# Patient Record
Sex: FEMALE | Race: BLACK OR AFRICAN AMERICAN | NOT HISPANIC OR LATINO | Employment: OTHER
[De-identification: names, ages, dates, MRNs, and addresses within clinical notes are randomized per-mention and may not be internally consistent; named-entity substitution may affect disease eponyms.]

---

## 2017-02-22 ENCOUNTER — CHARTING TRANS (OUTPATIENT)
Dept: OTHER | Age: 57
End: 2017-02-22

## 2017-02-22 ENCOUNTER — LAB SERVICES (OUTPATIENT)
Dept: OTHER | Age: 57
End: 2017-02-22

## 2017-02-22 LAB
APPEARANCE: CLEAR
BILIRUBIN: NORMAL
COLOR: YELLOW
KETONES: NEGATIVE
LEUKOCYTES: NORMAL
OCCULT BLOOD: NORMAL
PH: 5.5
PROTEIN: NEGATIVE
URINE SPEC GRAVITY: >=1.03

## 2017-02-27 ENCOUNTER — CHARTING TRANS (OUTPATIENT)
Dept: OTHER | Age: 57
End: 2017-02-27

## 2017-02-27 LAB — BACTERIA UR CULT: NORMAL

## 2017-03-07 ENCOUNTER — LAB SERVICES (OUTPATIENT)
Dept: OTHER | Age: 57
End: 2017-03-07

## 2017-03-07 ENCOUNTER — CHARTING TRANS (OUTPATIENT)
Dept: OTHER | Age: 57
End: 2017-03-07

## 2017-03-07 LAB
APPEARANCE: CLEAR
BILIRUBIN: NORMAL
COLOR: YELLOW
GLUCOSE U: NORMAL
KETONES: NORMAL
LEUKOCYTE ESTERASE: NORMAL
NITRITE: NORMAL
OCCULT BLOOD: NORMAL
PH: 56
PROTEIN: NORMAL
URINE SPEC GRAVITY: 1.02
UROBILINOGEN: 0.2

## 2017-03-09 LAB — BACTERIA UR CULT: NORMAL

## 2017-06-26 ENCOUNTER — CHARTING TRANS (OUTPATIENT)
Dept: OTHER | Age: 57
End: 2017-06-26

## 2017-06-26 ENCOUNTER — LAB SERVICES (OUTPATIENT)
Dept: OTHER | Age: 57
End: 2017-06-26

## 2017-06-26 LAB
APPEARANCE: CLEAR
BILIRUBIN: NORMAL
COLOR: YELLOW
GLUCOSE U: NORMAL
KETONES: NORMAL
LEUKOCYTE ESTERASE: NORMAL
LEUKOCYTES: NORMAL
NITRITE: NORMAL
OCCULT BLOOD: NORMAL
OTHER: NORMAL
PH: 5
PROTEIN: NORMAL
URINE SPEC GRAVITY: 1
UROBILINOGEN: 0.2

## 2017-06-30 LAB — BACTERIA UR CULT: NORMAL

## 2017-10-16 ENCOUNTER — HOSPITAL ENCOUNTER (OUTPATIENT)
Dept: CT IMAGING | Age: 57
Discharge: HOME | End: 2017-10-16
Attending: ORTHOPAEDIC SURGERY
Payer: COMMERCIAL

## 2017-10-16 DIAGNOSIS — M19.021 OSTEOARTHRITIS OF RIGHT ELBOW: ICD-10-CM

## 2017-10-16 PROCEDURE — 76376 3D RENDER W/INTRP POSTPROCES: CPT | Performed by: ORTHOPAEDIC SURGERY

## 2017-10-16 PROCEDURE — 73200 CT UPPER EXTREMITY W/O DYE: CPT | Performed by: ORTHOPAEDIC SURGERY

## 2017-12-06 ENCOUNTER — APPOINTMENT (OUTPATIENT)
Dept: LAB | Age: 57
End: 2017-12-06
Attending: ORTHOPAEDIC SURGERY
Payer: COMMERCIAL

## 2017-12-06 ENCOUNTER — APPOINTMENT (OUTPATIENT)
Dept: LAB | Facility: HOSPITAL | Age: 57
End: 2017-12-06
Attending: ORTHOPAEDIC SURGERY
Payer: COMMERCIAL

## 2017-12-06 ENCOUNTER — LAB ENCOUNTER (OUTPATIENT)
Dept: LAB | Age: 57
End: 2017-12-06
Attending: ORTHOPAEDIC SURGERY
Payer: COMMERCIAL

## 2017-12-06 DIAGNOSIS — M19.021 OSTEOARTHRITIS OF RIGHT ELBOW: ICD-10-CM

## 2017-12-06 DIAGNOSIS — M19.021 OSTEOARTHRITIS OF RIGHT ELBOW: Primary | ICD-10-CM

## 2017-12-06 PROCEDURE — 36415 COLL VENOUS BLD VENIPUNCTURE: CPT

## 2017-12-06 PROCEDURE — 80048 BASIC METABOLIC PNL TOTAL CA: CPT

## 2017-12-06 PROCEDURE — 93010 ELECTROCARDIOGRAM REPORT: CPT | Performed by: ORTHOPAEDIC SURGERY

## 2017-12-06 PROCEDURE — 93005 ELECTROCARDIOGRAM TRACING: CPT

## 2017-12-16 RX ORDER — OMEGA-3 FATTY ACIDS/FISH OIL 300-1000MG
3 CAPSULE ORAL 2 TIMES DAILY PRN
COMMUNITY
End: 2019-07-22

## 2017-12-19 ENCOUNTER — HOSPITAL ENCOUNTER (OUTPATIENT)
Facility: HOSPITAL | Age: 57
Setting detail: HOSPITAL OUTPATIENT SURGERY
Discharge: HOME | End: 2017-12-19
Attending: ORTHOPAEDIC SURGERY | Admitting: ORTHOPAEDIC SURGERY
Payer: COMMERCIAL

## 2017-12-19 ENCOUNTER — ANESTHESIA (OUTPATIENT)
Dept: SURGERY | Facility: HOSPITAL | Age: 57
End: 2017-12-19
Payer: COMMERCIAL

## 2017-12-19 ENCOUNTER — SURGERY (OUTPATIENT)
Age: 57
End: 2017-12-19

## 2017-12-19 ENCOUNTER — ANESTHESIA EVENT (OUTPATIENT)
Dept: SURGERY | Facility: HOSPITAL | Age: 57
End: 2017-12-19
Payer: COMMERCIAL

## 2017-12-19 VITALS
WEIGHT: 230 LBS | BODY MASS INDEX: 42.33 KG/M2 | DIASTOLIC BLOOD PRESSURE: 75 MMHG | HEIGHT: 62 IN | TEMPERATURE: 98 F | HEART RATE: 95 BPM | OXYGEN SATURATION: 94 % | RESPIRATION RATE: 15 BRPM | SYSTOLIC BLOOD PRESSURE: 139 MMHG

## 2017-12-19 DIAGNOSIS — G56.01 CARPAL TUNNEL SYNDROME OF RIGHT WRIST: ICD-10-CM

## 2017-12-19 DIAGNOSIS — M19.021 PRIMARY OSTEOARTHRITIS OF RIGHT ELBOW: Primary | ICD-10-CM

## 2017-12-19 PROCEDURE — 99152 MOD SED SAME PHYS/QHP 5/>YRS: CPT | Performed by: ORTHOPAEDIC SURGERY

## 2017-12-19 PROCEDURE — 64415 NJX AA&/STRD BRCH PLXS IMG: CPT | Performed by: ORTHOPAEDIC SURGERY

## 2017-12-19 PROCEDURE — 88305 TISSUE EXAM BY PATHOLOGIST: CPT | Performed by: ORTHOPAEDIC SURGERY

## 2017-12-19 PROCEDURE — 3E0T3BZ INTRODUCTION OF ANESTHETIC AGENT INTO PERIPHERAL NERVES AND PLEXI, PERCUTANEOUS APPROACH: ICD-10-PCS | Performed by: ANESTHESIOLOGY

## 2017-12-19 PROCEDURE — 01N40ZZ RELEASE ULNAR NERVE, OPEN APPROACH: ICD-10-PCS | Performed by: ORTHOPAEDIC SURGERY

## 2017-12-19 PROCEDURE — 01N50ZZ RELEASE MEDIAN NERVE, OPEN APPROACH: ICD-10-PCS | Performed by: ORTHOPAEDIC SURGERY

## 2017-12-19 PROCEDURE — 76942 ECHO GUIDE FOR BIOPSY: CPT | Performed by: ORTHOPAEDIC SURGERY

## 2017-12-19 PROCEDURE — 88311 DECALCIFY TISSUE: CPT | Performed by: ORTHOPAEDIC SURGERY

## 2017-12-19 PROCEDURE — 88304 TISSUE EXAM BY PATHOLOGIST: CPT | Performed by: ORTHOPAEDIC SURGERY

## 2017-12-19 RX ORDER — LIDOCAINE HYDROCHLORIDE 10 MG/ML
INJECTION, SOLUTION EPIDURAL; INFILTRATION; INTRACAUDAL; PERINEURAL AS NEEDED
Status: DISCONTINUED | OUTPATIENT
Start: 2017-12-19 | End: 2017-12-19 | Stop reason: SURG

## 2017-12-19 RX ORDER — HYDROCODONE BITARTRATE AND ACETAMINOPHEN 5; 325 MG/1; MG/1
2 TABLET ORAL AS NEEDED
Status: COMPLETED | OUTPATIENT
Start: 2017-12-19 | End: 2017-12-19

## 2017-12-19 RX ORDER — LIDOCAINE HYDROCHLORIDE 10 MG/ML
INJECTION, SOLUTION EPIDURAL; INFILTRATION; INTRACAUDAL; PERINEURAL AS NEEDED
Status: DISCONTINUED | OUTPATIENT
Start: 2017-12-19 | End: 2017-12-19

## 2017-12-19 RX ORDER — METOCLOPRAMIDE 10 MG/1
10 TABLET ORAL ONCE
Status: COMPLETED | OUTPATIENT
Start: 2017-12-19 | End: 2017-12-19

## 2017-12-19 RX ORDER — ROPIVACAINE HYDROCHLORIDE 5 MG/ML
INJECTION, SOLUTION EPIDURAL; INFILTRATION; PERINEURAL AS NEEDED
Status: DISCONTINUED | OUTPATIENT
Start: 2017-12-19 | End: 2017-12-19 | Stop reason: SURG

## 2017-12-19 RX ORDER — ACETAMINOPHEN 500 MG
1000 TABLET ORAL ONCE
Status: COMPLETED | OUTPATIENT
Start: 2017-12-19 | End: 2017-12-19

## 2017-12-19 RX ORDER — CEFAZOLIN SODIUM/WATER 2 G/20 ML
2 SYRINGE (ML) INTRAVENOUS ONCE
Status: COMPLETED | OUTPATIENT
Start: 2017-12-19 | End: 2017-12-19

## 2017-12-19 RX ORDER — MORPHINE SULFATE 10 MG/ML
6 INJECTION, SOLUTION INTRAMUSCULAR; INTRAVENOUS EVERY 10 MIN PRN
Status: DISCONTINUED | OUTPATIENT
Start: 2017-12-19 | End: 2017-12-19

## 2017-12-19 RX ORDER — SODIUM CHLORIDE, SODIUM LACTATE, POTASSIUM CHLORIDE, CALCIUM CHLORIDE 600; 310; 30; 20 MG/100ML; MG/100ML; MG/100ML; MG/100ML
INJECTION, SOLUTION INTRAVENOUS CONTINUOUS
Status: DISCONTINUED | OUTPATIENT
Start: 2017-12-19 | End: 2017-12-19

## 2017-12-19 RX ORDER — DEXAMETHASONE SODIUM PHOSPHATE 4 MG/ML
VIAL (ML) INJECTION AS NEEDED
Status: DISCONTINUED | OUTPATIENT
Start: 2017-12-19 | End: 2017-12-19 | Stop reason: SURG

## 2017-12-19 RX ORDER — HYDROCODONE BITARTRATE AND ACETAMINOPHEN 5; 325 MG/1; MG/1
1 TABLET ORAL AS NEEDED
Status: COMPLETED | OUTPATIENT
Start: 2017-12-19 | End: 2017-12-19

## 2017-12-19 RX ORDER — NALOXONE HYDROCHLORIDE 0.4 MG/ML
80 INJECTION, SOLUTION INTRAMUSCULAR; INTRAVENOUS; SUBCUTANEOUS AS NEEDED
Status: ACTIVE | OUTPATIENT
Start: 2017-12-19 | End: 2017-12-19

## 2017-12-19 RX ORDER — ONDANSETRON 2 MG/ML
INJECTION INTRAMUSCULAR; INTRAVENOUS AS NEEDED
Status: DISCONTINUED | OUTPATIENT
Start: 2017-12-19 | End: 2017-12-19 | Stop reason: SURG

## 2017-12-19 RX ORDER — ONDANSETRON 2 MG/ML
4 INJECTION INTRAMUSCULAR; INTRAVENOUS ONCE AS NEEDED
Status: DISCONTINUED | OUTPATIENT
Start: 2017-12-19 | End: 2017-12-19

## 2017-12-19 RX ORDER — HYDROMORPHONE HYDROCHLORIDE 1 MG/ML
0.6 INJECTION, SOLUTION INTRAMUSCULAR; INTRAVENOUS; SUBCUTANEOUS EVERY 5 MIN PRN
Status: DISCONTINUED | OUTPATIENT
Start: 2017-12-19 | End: 2017-12-19

## 2017-12-19 RX ORDER — MORPHINE SULFATE 2 MG/ML
2 INJECTION, SOLUTION INTRAMUSCULAR; INTRAVENOUS EVERY 10 MIN PRN
Status: DISCONTINUED | OUTPATIENT
Start: 2017-12-19 | End: 2017-12-19

## 2017-12-19 RX ORDER — HALOPERIDOL 5 MG/ML
0.25 INJECTION INTRAMUSCULAR ONCE AS NEEDED
Status: DISCONTINUED | OUTPATIENT
Start: 2017-12-19 | End: 2017-12-19

## 2017-12-19 RX ORDER — FAMOTIDINE 20 MG/1
20 TABLET ORAL ONCE
Status: COMPLETED | OUTPATIENT
Start: 2017-12-19 | End: 2017-12-19

## 2017-12-19 RX ORDER — HYDROMORPHONE HYDROCHLORIDE 1 MG/ML
0.2 INJECTION, SOLUTION INTRAMUSCULAR; INTRAVENOUS; SUBCUTANEOUS EVERY 5 MIN PRN
Status: DISCONTINUED | OUTPATIENT
Start: 2017-12-19 | End: 2017-12-19

## 2017-12-19 RX ORDER — HYDROMORPHONE HYDROCHLORIDE 1 MG/ML
0.4 INJECTION, SOLUTION INTRAMUSCULAR; INTRAVENOUS; SUBCUTANEOUS EVERY 5 MIN PRN
Status: DISCONTINUED | OUTPATIENT
Start: 2017-12-19 | End: 2017-12-19

## 2017-12-19 RX ORDER — MIDAZOLAM HYDROCHLORIDE 1 MG/ML
INJECTION INTRAMUSCULAR; INTRAVENOUS AS NEEDED
Status: DISCONTINUED | OUTPATIENT
Start: 2017-12-19 | End: 2017-12-19 | Stop reason: SURG

## 2017-12-19 RX ORDER — MORPHINE SULFATE 4 MG/ML
4 INJECTION, SOLUTION INTRAMUSCULAR; INTRAVENOUS EVERY 10 MIN PRN
Status: DISCONTINUED | OUTPATIENT
Start: 2017-12-19 | End: 2017-12-19

## 2017-12-19 RX ADMIN — LIDOCAINE HYDROCHLORIDE 25 MG: 10 INJECTION, SOLUTION EPIDURAL; INFILTRATION; INTRACAUDAL; PERINEURAL at 16:19:00

## 2017-12-19 RX ADMIN — SODIUM CHLORIDE, SODIUM LACTATE, POTASSIUM CHLORIDE, CALCIUM CHLORIDE: 600; 310; 30; 20 INJECTION, SOLUTION INTRAVENOUS at 15:25:00

## 2017-12-19 RX ADMIN — DEXAMETHASONE SODIUM PHOSPHATE 4 MG: 4 MG/ML VIAL (ML) INJECTION at 11:24:00

## 2017-12-19 RX ADMIN — ONDANSETRON 4 MG: 2 INJECTION INTRAMUSCULAR; INTRAVENOUS at 11:24:00

## 2017-12-19 RX ADMIN — CEFAZOLIN SODIUM/WATER 2 G: 2 G/20 ML SYRINGE (ML) INTRAVENOUS at 11:20:00

## 2017-12-19 RX ADMIN — CEFAZOLIN SODIUM/WATER 2 G: 2 G/20 ML SYRINGE (ML) INTRAVENOUS at 15:20:00

## 2017-12-19 RX ADMIN — MIDAZOLAM HYDROCHLORIDE 2 MG: 1 INJECTION INTRAMUSCULAR; INTRAVENOUS at 11:14:00

## 2017-12-19 RX ADMIN — ROPIVACAINE HYDROCHLORIDE 30 ML: 5 INJECTION, SOLUTION EPIDURAL; INFILTRATION; PERINEURAL at 16:28:00

## 2017-12-19 RX ADMIN — SODIUM CHLORIDE, SODIUM LACTATE, POTASSIUM CHLORIDE, CALCIUM CHLORIDE: 600; 310; 30; 20 INJECTION, SOLUTION INTRAVENOUS at 11:09:00

## 2017-12-19 RX ADMIN — LIDOCAINE HYDROCHLORIDE 50 MG: 10 INJECTION, SOLUTION EPIDURAL; INFILTRATION; INTRACAUDAL; PERINEURAL at 11:14:00

## 2017-12-19 NOTE — ANESTHESIA POSTPROCEDURE EVALUATION
Patient: Moose Self    Procedure Summary     Date:  12/19/17 Room / Location:  [address] [place] [address] MAIN OR 03 / [address] [place] [address] MAIN OR    Anesthesia Start:  1109 Anesthesia Stop:      Procedures:       EXTREMITY UPPER IRRIGATION & DEBRIDEMENT (Right Elbow)      ELBOW ULNAR NERV

## 2017-12-19 NOTE — ANESTHESIA PREPROCEDURE EVALUATION
Anesthesia PreOp Note    HPI:     Rasta Freeman is a 62year old female who presents for preoperative consultation requested by: Pamela Deng MD    Date of Surgery: 12/19/2017    Procedure(s):  EXTREMITY UPPER IRRIGATION & DEBRIDEMENT  ELBOW ULNAR NER POTASSIUM OR Take 99 mcg by mouth daily. Disp:  Rfl:  12/12/2017 at Unknown time   Multiple Vitamins-Minerals (MULTIVITAMIN OR) Take 1 tablet by mouth daily.    Disp:  Rfl:  12/12/2017 at Unknown time       Current Facility-Administered Medications Orde Weight: 104.3 kg (230 lb) 104.3 kg (230 lb)   Height: 1.575 m (5' 2\")         Anesthesia ROS/Med Hx and Physical Exam     Patient summary reviewed and Nursing notes reviewed    No history of anesthetic complications   Airway   Mallampati: I  TM distance

## 2017-12-19 NOTE — PROGRESS NOTES
[facility]    Progress Note    Deja López Patient Status:  Hospital Outpatient Surgery    3/31/1960 MRN [id number]   Location [facility] PRE OP RECOVERY Attending Herber Brown MD   Hosp Day # 0 PCP Kade Fountain MD

## 2017-12-19 NOTE — BRIEF OP NOTE
Pre-Operative Diagnosis: primary osteoarthritis right elbow     Post-Operative Diagnosis: primary osteoarthritis right elbow     Procedure Performed:   Procedure(s):  right elbow debridement of osteophytes, contracture release,cubital tunnel release,rig

## 2017-12-19 NOTE — PROGRESS NOTES
[facility]    Progress Note    Ivan Lundborg Patient Status:  Hospital Outpatient Surgery    3/31/1960 MRN [id number]   Location [facility] PRE OP RECOVERY Attending Chen Sarmiento MD   Hosp Day # 0 PCP Benedict Vasquez MD

## 2017-12-19 NOTE — ANESTHESIA PROCEDURE NOTES
Peripheral Block    Anesthesiologist:  Sissy Andino  Performed by:   Anesthesiologist  Patient Location:  PACU  Start Time:  12/19/2017 4:17 PM  End Time:  12/19/2017 4:28 PM  Site Identification: ultrasound guided, real time ultrasound guided, nerve stimu

## 2017-12-20 NOTE — OPERATIVE REPORT
[facility]    PATIENT'S NAME: Mireya Krishnamurthy   ATTENDING PHYSICIAN: Apoorva Abraham MD   OPERATING PHYSICIAN: Apoorva Abraham MD   PATIENT ACCOUNT#:   [id number]    LOCATION:  [place] [address]  MEDICAL RECORD #:   [id number]       DATE OF BIR room for surgical intervention. PROCEDURE:  On the afternoon of 12/19/2017, the patient was identified in the preoperative holding area.   The right upper extremity was marked as the surgical site of interest.  She was transported to the operating room w posteromedial approach to the right elbow was carried out. The skin overlying the posteromedial aspect of the right elbow was incised with a 15 blade scalpel.   The subcutaneous tissues were bluntly dissected, taking care to preserve branches of the medial capsule at this point. The flexor pronator and brachialis muscular sleeve was then retracted anteriorly allowing for exposure of the anterior capsule. The anterior capsule was then resected utilizing a Metzenbaum scissors.   Osteophytes at the level of th Sergey Barnes MD  d: 12/19/2017 16:08:10  t: 12/19/2017 19:28:51  [facility] [id number]  DB/

## 2017-12-29 ENCOUNTER — LAB SERVICES (OUTPATIENT)
Dept: OTHER | Age: 57
End: 2017-12-29

## 2017-12-29 ENCOUNTER — CHARTING TRANS (OUTPATIENT)
Dept: OTHER | Age: 57
End: 2017-12-29

## 2017-12-29 LAB
PEAKFLOW 1: 400
PEAKFLOW 2: 450
PEAKFLOW 3: 480

## 2017-12-31 NOTE — H&P
Ruth Benites 541 Patient Status:  [facility] Outpatient Surgery    3/31/1960 MRN [id number]   Location [facility] Attending No att. providers found   Hosp Day # 0 PCP Madisyn Tong pulse 95, temperature 98.1 °F (36.7 °C), temperature source Temporal, resp. rate 15, height 5' 2\" (1.575 m), weight 230 lb (104.3 kg), last menstrual period 12/15/2010, SpO2 94 %, not currently breastfeeding.        Ortho Exam     Right elbow contracture 4

## 2018-03-11 ENCOUNTER — CHARTING TRANS (OUTPATIENT)
Dept: OTHER | Age: 58
End: 2018-03-11

## 2018-03-11 ENCOUNTER — LAB SERVICES (OUTPATIENT)
Dept: OTHER | Age: 58
End: 2018-03-11

## 2018-03-11 LAB
LEUKOCYTES: NORMAL
NITRITE: POSITIVE
OCCULT BLOOD: NORMAL

## 2018-03-13 LAB — BACTERIA UR CULT: NORMAL

## 2018-04-17 ENCOUNTER — LAB SERVICES (OUTPATIENT)
Dept: OTHER | Age: 58
End: 2018-04-17

## 2018-04-17 ENCOUNTER — CHARTING TRANS (OUTPATIENT)
Dept: OTHER | Age: 58
End: 2018-04-17

## 2018-04-17 LAB
APPEARANCE: CLEAR
BILIRUBIN: NEGATIVE
COLOR: YELLOW
GLUCOSE U: NEGATIVE
KETONES: NEGATIVE
LEUKOCYTE ESTERASE: NORMAL
LEUKOCYTES: NEGATIVE
NITRITE: NEGATIVE
OCCULT BLOOD: NEGATIVE
OTHER: NORMAL
PH: 6
PROTEIN: NEGATIVE
URINE SPEC GRAVITY: 1.02
UROBILINOGEN: 0.2

## 2018-04-19 LAB — BACTERIA UR CULT: NORMAL

## 2018-05-21 ENCOUNTER — CHARTING TRANS (OUTPATIENT)
Dept: OTHER | Age: 58
End: 2018-05-21

## 2018-05-21 ENCOUNTER — HOSPITAL ENCOUNTER (OUTPATIENT)
Age: 58
Discharge: HOME | End: 2018-05-21
Payer: COMMERCIAL

## 2018-05-21 VITALS
RESPIRATION RATE: 18 BRPM | DIASTOLIC BLOOD PRESSURE: 71 MMHG | HEART RATE: 79 BPM | OXYGEN SATURATION: 99 % | TEMPERATURE: 99 F | SYSTOLIC BLOOD PRESSURE: 134 MMHG

## 2018-05-21 DIAGNOSIS — R82.90 ABNORMAL URINE ODOR: Primary | ICD-10-CM

## 2018-05-21 PROCEDURE — 99203 OFFICE O/P NEW LOW 30 MIN: CPT

## 2018-05-21 PROCEDURE — 87086 URINE CULTURE/COLONY COUNT: CPT | Performed by: PHYSICIAN ASSISTANT

## 2018-05-21 PROCEDURE — 99214 OFFICE O/P EST MOD 30 MIN: CPT

## 2018-05-21 PROCEDURE — 81002 URINALYSIS NONAUTO W/O SCOPE: CPT

## 2018-05-22 NOTE — ED INITIAL ASSESSMENT (HPI)
PATIENT REPORTS FREQUENT UTI'S OVER THE LAST FEW MONTHS, TREATED AT [facility]. PATIENT HAS NOT FOLLOWED UP WITH HER PCP.

## 2018-05-22 NOTE — ED PROVIDER NOTES
Patient Seen in: [facility]    History   Patient presents with:  Urinary Symptoms (urologic)    Stated Complaint: UTI    HPI    Patient is a 60-year-old female who presents for evaluation of 1 episode of malodorous urine UTI  Other systems are as noted in HPI. Constitutional and vital signs reviewed. All other systems reviewed and negative except as noted above.     Physical Exam   ED Triage Vitals [05/21/18 1934]  BP: 134/71  Pulse: 79  Resp: 18  Temp: 99 °F (37.2 °C Jeane Merino 50    Schedule an appointment as soon as possible for a visit   2-3 days

## 2018-05-22 NOTE — ED INITIAL ASSESSMENT (HPI)
REPORTS MALODORUS URINE WITH FIRST VOID THIS MORNING. CONCERNED ABOUT UTI OR YEAST INFECTION. DENIES VAGINAL DISCHARGE.

## 2018-11-01 VITALS
DIASTOLIC BLOOD PRESSURE: 80 MMHG | RESPIRATION RATE: 16 BRPM | WEIGHT: 223 LBS | HEIGHT: 62 IN | OXYGEN SATURATION: 98 % | SYSTOLIC BLOOD PRESSURE: 118 MMHG | BODY MASS INDEX: 41.04 KG/M2 | HEART RATE: 80 BPM | TEMPERATURE: 98.9 F

## 2018-11-01 VITALS
WEIGHT: 234 LBS | RESPIRATION RATE: 16 BRPM | DIASTOLIC BLOOD PRESSURE: 86 MMHG | HEART RATE: 88 BPM | OXYGEN SATURATION: 96 % | TEMPERATURE: 98.8 F | SYSTOLIC BLOOD PRESSURE: 122 MMHG | HEIGHT: 62 IN | BODY MASS INDEX: 43.06 KG/M2

## 2018-11-01 VITALS
DIASTOLIC BLOOD PRESSURE: 84 MMHG | RESPIRATION RATE: 16 BRPM | TEMPERATURE: 98.9 F | SYSTOLIC BLOOD PRESSURE: 120 MMHG | HEART RATE: 82 BPM

## 2018-11-02 VITALS
WEIGHT: 234.24 LBS | OXYGEN SATURATION: 98 % | HEIGHT: 62 IN | TEMPERATURE: 98.1 F | HEART RATE: 78 BPM | BODY MASS INDEX: 43.11 KG/M2 | RESPIRATION RATE: 20 BRPM

## 2018-11-03 VITALS
WEIGHT: 207 LBS | SYSTOLIC BLOOD PRESSURE: 130 MMHG | HEART RATE: 92 BPM | DIASTOLIC BLOOD PRESSURE: 84 MMHG | RESPIRATION RATE: 16 BRPM | BODY MASS INDEX: 38.09 KG/M2 | OXYGEN SATURATION: 97 % | TEMPERATURE: 98.6 F | HEIGHT: 62 IN

## 2018-11-05 VITALS
BODY MASS INDEX: 38.14 KG/M2 | HEART RATE: 97 BPM | HEIGHT: 62 IN | WEIGHT: 207.24 LBS | OXYGEN SATURATION: 98 % | TEMPERATURE: 98.3 F

## 2018-11-05 VITALS
WEIGHT: 207.24 LBS | HEIGHT: 62 IN | TEMPERATURE: 98.2 F | HEART RATE: 85 BPM | OXYGEN SATURATION: 97 % | RESPIRATION RATE: 14 BRPM | BODY MASS INDEX: 38.14 KG/M2

## 2018-11-13 ENCOUNTER — HOSPITAL (OUTPATIENT)
Dept: OTHER | Age: 58
End: 2018-11-13
Attending: SPECIALIST

## 2018-11-13 ENCOUNTER — IMAGING SERVICES (OUTPATIENT)
Dept: OTHER | Age: 58
End: 2018-11-13

## 2018-11-13 ENCOUNTER — CHARTING TRANS (OUTPATIENT)
Dept: OTHER | Age: 58
End: 2018-11-13

## 2018-11-17 ENCOUNTER — CHARTING TRANS (OUTPATIENT)
Dept: OTHER | Age: 58
End: 2018-11-17

## 2018-11-17 ASSESSMENT — PAIN SCALES - GENERAL: PAINLEVEL_OUTOF10: 0

## 2018-12-07 VITALS
HEIGHT: 62 IN | BODY MASS INDEX: 41.38 KG/M2 | OXYGEN SATURATION: 98 % | HEART RATE: 98 BPM | TEMPERATURE: 98.1 F | RESPIRATION RATE: 17 BRPM | WEIGHT: 224.87 LBS

## 2018-12-07 VITALS — WEIGHT: 224.87 LBS | BODY MASS INDEX: 41.38 KG/M2 | HEIGHT: 62 IN | HEART RATE: 78 BPM | OXYGEN SATURATION: 100 %

## 2019-01-01 ENCOUNTER — EXTERNAL RECORD (OUTPATIENT)
Dept: HEALTH INFORMATION MANAGEMENT | Facility: OTHER | Age: 59
End: 2019-01-01

## 2019-01-15 DIAGNOSIS — E11.9 TYPE 2 DIABETES MELLITUS WITHOUT COMPLICATION, WITHOUT LONG-TERM CURRENT USE OF INSULIN (CMD): Primary | ICD-10-CM

## 2019-07-08 ENCOUNTER — HOSPITAL ENCOUNTER (OUTPATIENT)
Age: 59
Discharge: HOME | End: 2019-07-08
Payer: COMMERCIAL

## 2019-07-08 VITALS
HEART RATE: 88 BPM | HEIGHT: 62 IN | WEIGHT: 225 LBS | BODY MASS INDEX: 41.41 KG/M2 | RESPIRATION RATE: 20 BRPM | OXYGEN SATURATION: 96 % | TEMPERATURE: 98 F | SYSTOLIC BLOOD PRESSURE: 122 MMHG | DIASTOLIC BLOOD PRESSURE: 60 MMHG

## 2019-07-08 DIAGNOSIS — M79.604 PAIN IN BOTH LOWER EXTREMITIES: Primary | ICD-10-CM

## 2019-07-08 DIAGNOSIS — I83.813 VARICOSE VEINS OF BOTH LOWER EXTREMITIES WITH PAIN: ICD-10-CM

## 2019-07-08 DIAGNOSIS — M79.605 PAIN IN BOTH LOWER EXTREMITIES: Primary | ICD-10-CM

## 2019-07-08 PROCEDURE — 99214 OFFICE O/P EST MOD 30 MIN: CPT

## 2019-07-08 RX ORDER — TRAMADOL HYDROCHLORIDE 50 MG/1
TABLET ORAL EVERY 6 HOURS PRN
Qty: 10 TABLET | Refills: 0 | Status: SHIPPED | OUTPATIENT
Start: 2019-07-08 | End: 2019-07-15

## 2019-07-08 NOTE — ED PROVIDER NOTES
Patient presents with:  Leg Pain      HPI:     Ivan Lundborg is a 61year old female with a history of fast arthritis, hyperlipidemia, asthma presents the chief complaint of bilateral lower extremity pain.   Patient states she has intermittent bilateral l the pain is caused from her bulging varicose veins. Encouraged her to follow-up with primary care provider. Patient states she has been having [facility] and encouraged her to call to schedule follow-up appointment.   Patient verbalized plan of ca

## 2019-07-22 ENCOUNTER — OFFICE VISIT (OUTPATIENT)
Dept: INTERNAL MEDICINE CLINIC | Facility: CLINIC | Age: 59
End: 2019-07-22
Payer: COMMERCIAL

## 2019-07-22 ENCOUNTER — TELEPHONE (OUTPATIENT)
Dept: INTERNAL MEDICINE CLINIC | Facility: CLINIC | Age: 59
End: 2019-07-22

## 2019-07-22 VITALS
BODY MASS INDEX: 42.14 KG/M2 | WEIGHT: 229 LBS | HEIGHT: 62 IN | HEART RATE: 92 BPM | DIASTOLIC BLOOD PRESSURE: 82 MMHG | SYSTOLIC BLOOD PRESSURE: 139 MMHG

## 2019-07-22 DIAGNOSIS — I83.813 VARICOSE VEINS OF BILATERAL LOWER EXTREMITIES WITH PAIN: Primary | ICD-10-CM

## 2019-07-22 PROCEDURE — 99203 OFFICE O/P NEW LOW 30 MIN: CPT | Performed by: PHYSICIAN ASSISTANT

## 2019-07-22 RX ORDER — TRAMADOL HYDROCHLORIDE 50 MG/1
50 TABLET ORAL EVERY 8 HOURS PRN
Qty: 30 TABLET | Refills: 0 | Status: SHIPPED | OUTPATIENT
Start: 2019-07-22 | End: 2021-07-21

## 2019-07-22 RX ORDER — BUDESONIDE AND FORMOTEROL FUMARATE DIHYDRATE 160; 4.5 UG/1; UG/1
AEROSOL RESPIRATORY (INHALATION) 2 TIMES DAILY
COMMUNITY
End: 2020-02-11

## 2019-07-22 NOTE — PROGRESS NOTES
HPI:    Patient ID: Heath Spiritism is a 61year old female. HPI  Patient presents the chief complaint of bilateral lower extremity pain. Patient states she has intermittent bilateral lower extremity pain for the last 2 weeks.   States she has \"nodules Surgical History:   Procedure Laterality Date   •      •  DELIVERY ONLY      x3   • ELBOW ULNAR NERVE TRANSPOSITION Right 2017    Performed by Marlys Sandoval MD at [place] [address]. Right  is warm and dry. BL nodules on the anterior and posterior legs, TTP   Varicose veins present    Psychiatric: She has a normal mood and affect. Her behavior is normal. Judgment and thought content normal.              ASSESSMENT/PLAN:   1.  Varicose veins

## 2019-07-22 NOTE — TELEPHONE ENCOUNTER
Patient was told to call back and let you know when she got the appt with the vein specialist. DR Cassandra Ron has no appts until 08/16/19, he only is in the office on Fridays.  Please advise if you want to change referral?

## 2019-08-16 DIAGNOSIS — I83.819 VARICOSE VEINS WITH PAIN: ICD-10-CM

## 2019-08-16 DIAGNOSIS — I87.2 VENOUS INSUFFICIENCY: Primary | ICD-10-CM

## 2019-08-16 RX ORDER — TRAMADOL HYDROCHLORIDE 50 MG/1
50 TABLET ORAL EVERY 8 HOURS PRN
Qty: 30 TABLET | Refills: 0 | OUTPATIENT
Start: 2019-08-16 | End: 2019-08-26

## 2019-09-05 ENCOUNTER — MED REC SCAN ONLY (OUTPATIENT)
Dept: INTERNAL MEDICINE CLINIC | Facility: CLINIC | Age: 59
End: 2019-09-05

## 2019-09-17 ENCOUNTER — HOSPITAL ENCOUNTER (OUTPATIENT)
Dept: ULTRASOUND IMAGING | Facility: HOSPITAL | Age: 59
Discharge: HOME | End: 2019-09-17
Attending: RADIOLOGY
Payer: COMMERCIAL

## 2019-09-17 DIAGNOSIS — I83.819 VARICOSE VEINS WITH PAIN: ICD-10-CM

## 2019-09-17 DIAGNOSIS — I87.2 VENOUS INSUFFICIENCY: ICD-10-CM

## 2019-09-17 PROCEDURE — 93970 EXTREMITY STUDY: CPT | Performed by: RADIOLOGY

## 2019-10-22 ENCOUNTER — LAB SERVICES (OUTPATIENT)
Dept: INTERNAL MEDICINE | Age: 59
End: 2019-10-22

## 2019-10-22 DIAGNOSIS — E11.9 CONTROLLED TYPE 2 DIABETES MELLITUS WITHOUT COMPLICATION, WITHOUT LONG-TERM CURRENT USE OF INSULIN (CMD): Primary | ICD-10-CM

## 2019-11-04 ENCOUNTER — TELEPHONE (OUTPATIENT)
Dept: INTERNAL MEDICINE | Age: 59
End: 2019-11-04

## 2019-11-22 ENCOUNTER — TELEPHONE (OUTPATIENT)
Dept: INTERNAL MEDICINE | Age: 59
End: 2019-11-22

## 2020-02-04 ENCOUNTER — HOSPITAL ENCOUNTER (EMERGENCY)
Facility: HOSPITAL | Age: 60
Discharge: HOME | End: 2020-02-05
Attending: EMERGENCY MEDICINE
Payer: COMMERCIAL

## 2020-02-04 ENCOUNTER — HOSPITAL ENCOUNTER (OUTPATIENT)
Age: 60
Discharge: HOME | End: 2020-02-04
Payer: COMMERCIAL

## 2020-02-04 ENCOUNTER — APPOINTMENT (OUTPATIENT)
Dept: CT IMAGING | Facility: HOSPITAL | Age: 60
End: 2020-02-04
Attending: EMERGENCY MEDICINE
Payer: COMMERCIAL

## 2020-02-04 VITALS
OXYGEN SATURATION: 98 % | TEMPERATURE: 98 F | HEART RATE: 99 BPM | SYSTOLIC BLOOD PRESSURE: 140 MMHG | RESPIRATION RATE: 18 BRPM | DIASTOLIC BLOOD PRESSURE: 74 MMHG

## 2020-02-04 DIAGNOSIS — R73.9 HYPERGLYCEMIA: ICD-10-CM

## 2020-02-04 DIAGNOSIS — R10.31 ABDOMINAL PAIN, RIGHT LOWER QUADRANT: Primary | ICD-10-CM

## 2020-02-04 DIAGNOSIS — R10.9 ABDOMINAL PAIN OF UNKNOWN ETIOLOGY: Primary | ICD-10-CM

## 2020-02-04 LAB
ANION GAP SERPL CALC-SCNC: 5 MMOL/L (ref 0–18)
BASOPHILS # BLD AUTO: 0.02 X10(3) UL (ref 0–0.2)
BASOPHILS NFR BLD AUTO: 0.4 %
BILIRUB UR QL STRIP: NEGATIVE
BILIRUB UR QL: NEGATIVE
BUN BLD-MCNC: 10 MG/DL (ref 7–18)
BUN/CREAT SERPL: 8.9 (ref 10–20)
CALCIUM BLD-MCNC: 8.9 MG/DL (ref 8.5–10.1)
CHLORIDE SERPL-SCNC: 105 MMOL/L (ref 98–112)
CLARITY UR: CLEAR
CLARITY UR: CLEAR
CO2 SERPL-SCNC: 27 MMOL/L (ref 21–32)
COLOR UR: YELLOW
COLOR UR: YELLOW
CREAT BLD-MCNC: 1.12 MG/DL (ref 0.55–1.02)
DEPRECATED RDW RBC AUTO: 44 FL (ref 35.1–46.3)
EOSINOPHIL # BLD AUTO: 0.11 X10(3) UL (ref 0–0.7)
EOSINOPHIL NFR BLD AUTO: 2 %
ERYTHROCYTE [DISTWIDTH] IN BLOOD BY AUTOMATED COUNT: 13.6 % (ref 11–15)
GLUCOSE BLD-MCNC: 444 MG/DL (ref 70–99)
GLUCOSE UR STRIP-MCNC: >=1000 MG/DL
GLUCOSE UR-MCNC: >=500 MG/DL
HCT VFR BLD AUTO: 42.6 % (ref 35–48)
HGB BLD-MCNC: 13.7 G/DL (ref 12–16)
IMM GRANULOCYTES # BLD AUTO: 0.01 X10(3) UL (ref 0–1)
IMM GRANULOCYTES NFR BLD: 0.2 %
KETONES UR-MCNC: NEGATIVE MG/DL
LEUKOCYTE ESTERASE UR QL STRIP.AUTO: NEGATIVE
LEUKOCYTE ESTERASE UR QL STRIP: NEGATIVE
LYMPHOCYTES # BLD AUTO: 2.74 X10(3) UL (ref 1–4)
LYMPHOCYTES NFR BLD AUTO: 49.9 %
MCH RBC QN AUTO: 28.5 PG (ref 26–34)
MCHC RBC AUTO-ENTMCNC: 32.2 G/DL (ref 31–37)
MCV RBC AUTO: 88.8 FL (ref 80–100)
MONOCYTES # BLD AUTO: 0.45 X10(3) UL (ref 0.1–1)
MONOCYTES NFR BLD AUTO: 8.2 %
NEUTROPHILS # BLD AUTO: 2.16 X10 (3) UL (ref 1.5–7.7)
NEUTROPHILS # BLD AUTO: 2.16 X10(3) UL (ref 1.5–7.7)
NEUTROPHILS NFR BLD AUTO: 39.3 %
NITRITE UR QL STRIP.AUTO: NEGATIVE
NITRITE UR QL STRIP: NEGATIVE
OSMOLALITY SERPL CALC.SUM OF ELEC: 302 MOSM/KG (ref 275–295)
PH UR STRIP: 5.5 [PH]
PH UR: 5 [PH] (ref 5–8)
PLATELET # BLD AUTO: 224 10(3)UL (ref 150–450)
POTASSIUM SERPL-SCNC: 3.9 MMOL/L (ref 3.5–5.1)
PROT UR STRIP-MCNC: NEGATIVE MG/DL
PROT UR-MCNC: NEGATIVE MG/DL
RBC # BLD AUTO: 4.8 X10(6)UL (ref 3.8–5.3)
RBC #/AREA URNS AUTO: 2 /HPF
SODIUM SERPL-SCNC: 137 MMOL/L (ref 136–145)
SP GR UR STRIP: 1.01
SP GR UR STRIP: 1.03 (ref 1–1.03)
UROBILINOGEN UR STRIP-ACNC: <2
UROBILINOGEN UR STRIP-ACNC: <2 MG/DL
WBC # BLD AUTO: 5.5 X10(3) UL (ref 4–11)
WBC #/AREA URNS AUTO: 1 /HPF

## 2020-02-04 PROCEDURE — 96361 HYDRATE IV INFUSION ADD-ON: CPT

## 2020-02-04 PROCEDURE — 74177 CT ABD & PELVIS W/CONTRAST: CPT | Performed by: EMERGENCY MEDICINE

## 2020-02-04 PROCEDURE — 99285 EMERGENCY DEPT VISIT HI MDM: CPT

## 2020-02-04 PROCEDURE — 81002 URINALYSIS NONAUTO W/O SCOPE: CPT

## 2020-02-04 PROCEDURE — 81001 URINALYSIS AUTO W/SCOPE: CPT | Performed by: EMERGENCY MEDICINE

## 2020-02-04 PROCEDURE — 99213 OFFICE O/P EST LOW 20 MIN: CPT

## 2020-02-04 PROCEDURE — 85025 COMPLETE CBC W/AUTO DIFF WBC: CPT | Performed by: EMERGENCY MEDICINE

## 2020-02-04 PROCEDURE — 99212 OFFICE O/P EST SF 10 MIN: CPT

## 2020-02-04 PROCEDURE — 96374 THER/PROPH/DIAG INJ IV PUSH: CPT

## 2020-02-04 PROCEDURE — 82962 GLUCOSE BLOOD TEST: CPT

## 2020-02-04 PROCEDURE — 80048 BASIC METABOLIC PNL TOTAL CA: CPT | Performed by: EMERGENCY MEDICINE

## 2020-02-04 RX ORDER — ROSUVASTATIN CALCIUM 20 MG/1
TABLET, COATED ORAL
COMMUNITY
Start: 2019-08-04 | End: 2021-10-14

## 2020-02-05 VITALS
RESPIRATION RATE: 20 BRPM | BODY MASS INDEX: 39.56 KG/M2 | SYSTOLIC BLOOD PRESSURE: 131 MMHG | HEART RATE: 77 BPM | DIASTOLIC BLOOD PRESSURE: 82 MMHG | TEMPERATURE: 98 F | OXYGEN SATURATION: 98 % | HEIGHT: 62 IN | WEIGHT: 215 LBS

## 2020-02-05 LAB
GLUCOSE BLDC GLUCOMTR-MCNC: 252 MG/DL (ref 70–99)
GLUCOSE BLDC GLUCOMTR-MCNC: 309 MG/DL (ref 70–99)
GLUCOSE BLDC GLUCOMTR-MCNC: 372 MG/DL (ref 70–99)

## 2020-02-05 PROCEDURE — 82962 GLUCOSE BLOOD TEST: CPT

## 2020-02-05 RX ORDER — TRAMADOL HYDROCHLORIDE 50 MG/1
50 TABLET ORAL EVERY 6 HOURS PRN
Qty: 10 TABLET | Refills: 0 | Status: SHIPPED | OUTPATIENT
Start: 2020-02-05 | End: 2020-02-07

## 2020-02-05 RX ORDER — GUAIFENESIN 600 MG
600 TABLET, EXTENDED RELEASE 12 HR ORAL ONCE
Status: COMPLETED | OUTPATIENT
Start: 2020-02-05 | End: 2020-02-05

## 2020-02-05 NOTE — ED PROVIDER NOTES
Patient Seen in: [facility] AND [facility] Emergency Department      History   Patient presents with:  Abdomen/Flank Pain    Stated Complaint: abdominal pain    HPI    60-year-old female with no significant past medical history here with complaints of right lowe and fever. HENT: Negative for congestion, ear pain and sore throat. Eyes: Negative for pain, discharge and redness. Respiratory: Negative for cough, shortness of breath and wheezing. Cardiovascular: Negative for chest pain.    Gastrointestinal: Po Musculoskeletal: Normal range of motion. General: No tenderness. Skin:     General: Skin is warm and dry. Findings: No rash. Neurological:      Mental Status: She is alert and oriented to person, place, and time.       Comments: 5/5 stren Collection Time: 02/04/20 10:23 PM   Result Value Ref Range    Hold Lavender Auto Resulted    RAINBOW DRAW GOLD    Collection Time: 02/04/20 10:23 PM   Result Value Ref Range    Hold Gold Auto Resulted    URINALYSIS WITH CULTURE REFLEX    Collection Time: 70 - 99   POCT GLUCOSE    Collection Time: 02/05/20  1:57 AM   Result Value Ref Range    POC Glucose  252 (H) 70 - 99       Imaging Results Available and Reviewed by me while in ED:        CT ABDOMEN AND PELVIS with IV contrast      IMPRESSION:  Normal kathy pertinent discharge summaries, testing, and procedures, and reviewed those reports. Complicating Factors: The patient already has does not have any pertinent problems on file. to contribute to the complexity of his ED evaluation.     - pt  mahsa wi

## 2020-02-05 NOTE — ED INITIAL ASSESSMENT (HPI)
Foul odor to urine 3 weeks ago. Cleared up with water and cranberry juice. C/o vaginal discomfort with itching and burning last week. Used monistat suppositories for last 3 nights. Now with discharge and pelvic pain. Intermittent RLQ pain.

## 2020-02-05 NOTE — ED INITIAL ASSESSMENT (HPI)
Sent from [facility] for RLQ abdominal pain starting today. Denies N/V/D, fevers.

## 2020-02-05 NOTE — ED PROVIDER NOTES
Patient presents with:  Urinary Symptoms      HPI:     Ericka Horn is a 61year old female who presents with a chief complaint of right lower quadrant pain that started today. She states the pain is gradually worsened.   She does have a history of gastric bypas Not on file    Lifestyle      Physical activity:        Days per week: Not on file        Minutes per session: Not on file      Stress: Not on file    Relationships      Social connections:        Talks on phone: Not on file        Gets together: Not on fi 02/04/20  7:53 PM   Result Value Ref Range    Urine Color Yellow Yellow    Urine Clarity Clear Clear    Specific Gravity, Urine 1.010 1.005 - 1.030    PH, Urine 5.5 5.0 - 8.0    Protein urine Negative Negative mg/dL    Glucose, Urine >=1000 (A) Negative mg

## 2020-02-07 ENCOUNTER — OFFICE VISIT (OUTPATIENT)
Dept: INTERNAL MEDICINE CLINIC | Facility: CLINIC | Age: 60
End: 2020-02-07
Payer: COMMERCIAL

## 2020-02-07 VITALS
HEIGHT: 62 IN | HEART RATE: 52 BPM | BODY MASS INDEX: 39.93 KG/M2 | WEIGHT: 217 LBS | DIASTOLIC BLOOD PRESSURE: 84 MMHG | SYSTOLIC BLOOD PRESSURE: 119 MMHG

## 2020-02-07 DIAGNOSIS — E78.2 MIXED HYPERLIPIDEMIA: ICD-10-CM

## 2020-02-07 DIAGNOSIS — Z23 NEED FOR VACCINATION: ICD-10-CM

## 2020-02-07 DIAGNOSIS — E11.9 TYPE 2 DIABETES MELLITUS WITHOUT COMPLICATION, WITHOUT LONG-TERM CURRENT USE OF INSULIN (HCC): Primary | ICD-10-CM

## 2020-02-07 PROCEDURE — 90471 IMMUNIZATION ADMIN: CPT | Performed by: INTERNAL MEDICINE

## 2020-02-07 PROCEDURE — 99214 OFFICE O/P EST MOD 30 MIN: CPT | Performed by: INTERNAL MEDICINE

## 2020-02-07 PROCEDURE — 90686 IIV4 VACC NO PRSV 0.5 ML IM: CPT | Performed by: INTERNAL MEDICINE

## 2020-02-07 RX ORDER — ALBUTEROL SULFATE 90 UG/1
AEROSOL, METERED RESPIRATORY (INHALATION)
COMMUNITY
Start: 2019-08-04 | End: 2021-07-21

## 2020-02-07 NOTE — PROGRESS NOTES
Dejuan Washburn is a 61year old female. HPI:   1.  Type 2 diabetes mellitus without complication, without long-term current use of insulin ([facility]    Patient has NEVER had diabetes diagnosed in the past. Say she does NOT have it despite blood sugar of over on exertion  GI: denies abdominal pain and denies heartburn  NEURO: denies headaches    EXAM:   /84 (BP Location: Right arm, Patient Position: Sitting, Cuff Size: large)   Pulse 52   Ht 5' 2\" (1.575 m)   Wt 217 lb (98.4 kg)   BMI 39.69 kg/m²     GEN

## 2020-02-11 PROBLEM — Z23 NEED FOR VACCINATION: Status: ACTIVE | Noted: 2020-02-11

## 2020-02-11 RX ORDER — BUDESONIDE AND FORMOTEROL FUMARATE DIHYDRATE 160; 4.5 UG/1; UG/1
2 AEROSOL RESPIRATORY (INHALATION) 2 TIMES DAILY
Qty: 1 INHALER | Refills: 3 | Status: SHIPPED | OUTPATIENT
Start: 2020-02-11 | End: 2020-04-23

## 2020-02-12 ENCOUNTER — TELEPHONE (OUTPATIENT)
Dept: INTERNAL MEDICINE CLINIC | Facility: CLINIC | Age: 60
End: 2020-02-12

## 2020-02-12 NOTE — TELEPHONE ENCOUNTER
Per patient she is asking why Dr Kendell Sandra did not issue her a blood glucose monitor so that she can check her sugar once in a while.

## 2020-02-12 NOTE — TELEPHONE ENCOUNTER
Metformin can cause some nausea but it is usually short lived. If it persists we should use another medication. 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

## 2020-02-12 NOTE — TELEPHONE ENCOUNTER
Patient indicated that saw Dr Johny Deleon on 2/7/2020 and was prescribed metformin for diabetes. Patient indicated that since taking the medication she feels very nauseated to the point where she can not eat anything.  Initially had diarrhea with medication bu

## 2020-02-18 ENCOUNTER — APPOINTMENT (OUTPATIENT)
Dept: LAB | Age: 60
End: 2020-02-18
Attending: INTERNAL MEDICINE
Payer: COMMERCIAL

## 2020-02-18 ENCOUNTER — HOSPITAL ENCOUNTER (OUTPATIENT)
Dept: ENDOCRINOLOGY | Facility: HOSPITAL | Age: 60
Discharge: HOME | End: 2020-02-18
Attending: INTERNAL MEDICINE
Payer: COMMERCIAL

## 2020-02-18 ENCOUNTER — TELEPHONE (OUTPATIENT)
Dept: INTERNAL MEDICINE CLINIC | Facility: CLINIC | Age: 60
End: 2020-02-18

## 2020-02-18 VITALS — BODY MASS INDEX: 39 KG/M2 | WEIGHT: 215.81 LBS

## 2020-02-18 DIAGNOSIS — E11.9 TYPE 2 DIABETES MELLITUS WITHOUT COMPLICATION, WITHOUT LONG-TERM CURRENT USE OF INSULIN (HCC): Primary | ICD-10-CM

## 2020-02-18 LAB
CREAT UR-SCNC: 151 MG/DL
EST. AVERAGE GLUCOSE BLD GHB EST-MCNC: 286 MG/DL (ref 68–126)
HBA1C MFR BLD HPLC: 11.6 % (ref ?–5.7)
MICROALBUMIN UR-MCNC: 0.71 MG/DL
MICROALBUMIN/CREAT 24H UR-RTO: 4.7 UG/MG (ref ?–30)

## 2020-02-18 PROCEDURE — 82570 ASSAY OF URINE CREATININE: CPT | Performed by: INTERNAL MEDICINE

## 2020-02-18 PROCEDURE — 82043 UR ALBUMIN QUANTITATIVE: CPT | Performed by: INTERNAL MEDICINE

## 2020-02-18 PROCEDURE — 36415 COLL VENOUS BLD VENIPUNCTURE: CPT | Performed by: INTERNAL MEDICINE

## 2020-02-18 PROCEDURE — 83036 HEMOGLOBIN GLYCOSYLATED A1C: CPT | Performed by: INTERNAL MEDICINE

## 2020-02-18 RX ORDER — BLOOD SUGAR DIAGNOSTIC
STRIP MISCELLANEOUS
Qty: 1 STRIP | Refills: 2 | Status: SHIPPED | OUTPATIENT
Start: 2020-02-18 | End: 2020-05-04

## 2020-02-18 RX ORDER — LANCETS 33 GAUGE
1 EACH MISCELLANEOUS 2 TIMES DAILY
Qty: 1 BOX | Refills: 2 | Status: SHIPPED | OUTPATIENT
Start: 2020-02-18 | End: 2020-02-19

## 2020-02-18 RX ORDER — LANCETS 33 GAUGE
1 EACH MISCELLANEOUS 2 TIMES DAILY
Qty: 1 BOX | Refills: 2 | Status: SHIPPED | OUTPATIENT
Start: 2020-02-18 | End: 2021-02-17

## 2020-02-18 NOTE — PROGRESS NOTES
Lucy Webb  : 3/31/1960 attended individual initial assessment for Diabetes Education    Date: 2020   Start time: 1300  End time: 1400    No results found for: A1C     Assessment:   New dx 1 week ago; pt still believes that her sugar may have classes. Prescriptions sent to preferred pharmacy for blood glucose meter, test strips and lancets per protocol. Written materials provided for all areas covered. Patient verbalized understanding and has no further questions at this time.     Aurea

## 2020-02-18 NOTE — TELEPHONE ENCOUNTER
Patient called office with a lab question. States she was seen at [facility] for diabetic teaching for new onset diabetes today. Patient was informed at visit that she has outstanding lab work that needs to be done. Patient states she was unaware.   Encounter f

## 2020-02-19 ENCOUNTER — TELEPHONE (OUTPATIENT)
Dept: ENDOCRINOLOGY | Facility: HOSPITAL | Age: 60
End: 2020-02-19

## 2020-02-19 DIAGNOSIS — E11.9 TYPE 2 DIABETES MELLITUS WITHOUT COMPLICATION, WITHOUT LONG-TERM CURRENT USE OF INSULIN (HCC): ICD-10-CM

## 2020-02-19 RX ORDER — BLOOD-GLUCOSE METER
1 EACH MISCELLANEOUS DAILY
Qty: 1 KIT | Refills: 0 | COMMUNITY
Start: 2020-02-19

## 2020-02-19 NOTE — TELEPHONE ENCOUNTER
Received VM from patient stating meter not received by pharmacy. Only test strips and lancets. Contacted pharmacy to process meter prescription and completed. Pt made aware. Patient verbalized understanding and had no further questions or concerns.

## 2020-03-20 ENCOUNTER — APPOINTMENT (OUTPATIENT)
Dept: ENDOCRINOLOGY | Facility: HOSPITAL | Age: 60
End: 2020-03-20
Attending: INTERNAL MEDICINE
Payer: COMMERCIAL

## 2020-04-17 ENCOUNTER — APPOINTMENT (OUTPATIENT)
Dept: ENDOCRINOLOGY | Facility: HOSPITAL | Age: 60
End: 2020-04-17
Attending: INTERNAL MEDICINE
Payer: COMMERCIAL

## 2020-04-23 ENCOUNTER — TELEPHONE (OUTPATIENT)
Dept: INTERNAL MEDICINE CLINIC | Facility: CLINIC | Age: 60
End: 2020-04-23

## 2020-04-23 RX ORDER — BUDESONIDE AND FORMOTEROL FUMARATE DIHYDRATE 160; 4.5 UG/1; UG/1
2 AEROSOL RESPIRATORY (INHALATION) 2 TIMES DAILY
Qty: 3 INHALER | Refills: 1 | Status: SHIPPED | OUTPATIENT
Start: 2020-04-23 | End: 2020-04-27

## 2020-04-23 RX ORDER — ALBUTEROL SULFATE 90 UG/1
1 AEROSOL, METERED RESPIRATORY (INHALATION) EVERY 6 HOURS PRN
Qty: 3 INHALER | Refills: 1 | Status: SHIPPED | OUTPATIENT
Start: 2020-04-23 | End: 2021-03-15

## 2020-04-23 NOTE — TELEPHONE ENCOUNTER
Patient calling to inform Dr. Antonio Watkins that her blood glucose readings have been averaging between 114 mg/dl (lowest) to 231 mg/dl (the highest) since 2/19/20. Patient states she was suppose to follow-up with Dr. Antonio Watkins for her diabetes.    Per mars

## 2020-04-24 ENCOUNTER — HOSPITAL ENCOUNTER (OUTPATIENT)
Dept: ENDOCRINOLOGY | Facility: HOSPITAL | Age: 60
Discharge: HOME | End: 2020-04-24
Attending: INTERNAL MEDICINE
Payer: COMMERCIAL

## 2020-04-24 DIAGNOSIS — E11.9 TYPE 2 DIABETES MELLITUS WITHOUT COMPLICATION, WITHOUT LONG-TERM CURRENT USE OF INSULIN (HCC): Primary | ICD-10-CM

## 2020-04-24 NOTE — PATIENT INSTRUCTIONS
Goals     1.  EDC - Monitoring (pt-stated)      Note created  4/24/2020  3:36 PM by Pardeep Keen RD     Rotate once a day testing b/t FBG, predinner, and 2 hrs after meal.

## 2020-04-24 NOTE — PROGRESS NOTES
Dustin Coker  [facility]7/92/1572 attended Step 2:  Real-time audio/video visit via Haiku/Kristian. Individual Visit due to COVID 19 risk and no class available in 2 months. Pt verbally consents to this audio-video visit.       Date: 4/24/2020  Start time: 026 848 14 90 weight loss. Reviewed and updated individual goals and action plan set by patient. Recommendations:  Implement healthy eating habits with portion control and following Balanced Plate Method. Monitor blood glucose as directed.   Attend remaining ses

## 2020-04-27 ENCOUNTER — VIRTUAL PHONE E/M (OUTPATIENT)
Dept: INTERNAL MEDICINE CLINIC | Facility: CLINIC | Age: 60
End: 2020-04-27
Payer: COMMERCIAL

## 2020-04-27 DIAGNOSIS — E11.9 TYPE 2 DIABETES MELLITUS WITHOUT COMPLICATION, WITHOUT LONG-TERM CURRENT USE OF INSULIN (HCC): Primary | ICD-10-CM

## 2020-04-27 DIAGNOSIS — E78.2 MIXED HYPERLIPIDEMIA: ICD-10-CM

## 2020-04-27 PROCEDURE — 99214 OFFICE O/P EST MOD 30 MIN: CPT | Performed by: INTERNAL MEDICINE

## 2020-04-27 RX ORDER — BUDESONIDE AND FORMOTEROL FUMARATE DIHYDRATE 80; 4.5 UG/1; UG/1
2 AEROSOL RESPIRATORY (INHALATION) 2 TIMES DAILY
Qty: 1 INHALER | Refills: 3 | Status: SHIPPED | OUTPATIENT
Start: 2020-04-27 | End: 2021-07-21

## 2020-04-27 NOTE — PROGRESS NOTES
Patient ID: Ramiro Kelley is a 61year old female.     1. Type 2 diabetes mellitus without complication, without long-term current use of insulin ([facility]    The patient has been taking all prescribed diabetic medications at home and has been following a d JOSSELINE     • HERNIA SURGERY     • OTHER      gastric bypass   • OTHER SURGICAL HISTORY      hernia repair/tummy tuck   • OTHER SURGICAL HISTORY      hernia repair   • TOTAL HIP REPLACEMENT Right 08/2016   • TOTAL HIP REPLACEMENT Left 10/2016   • WRIST Needs      Financial resource strain: Not on file      Food insecurity:        Worry: Not on file        Inability: Not on file      Transportation needs:        Medical: Not on file        Non-medical: Not on file    Tobacco Use      Smoking status: Forme minutes after taking it and this is not been going away. Her appetite is decreased as a result and her weight is come down.   I am interested in trying the Metformin at bedtime 1000 mg to see if this helps the situation but really we should switch to Britt and decision making. Appropriate medical decision-making and tests are ordered as detailed in the plan of care above.     Darius Osorio MD  4/27/2020

## 2020-04-28 ENCOUNTER — PATIENT MESSAGE (OUTPATIENT)
Dept: INTERNAL MEDICINE CLINIC | Facility: CLINIC | Age: 60
End: 2020-04-28

## 2020-04-29 ENCOUNTER — HOSPITAL ENCOUNTER (OUTPATIENT)
Dept: ENDOCRINOLOGY | Facility: HOSPITAL | Age: 60
Discharge: HOME | End: 2020-04-29
Attending: INTERNAL MEDICINE
Payer: COMMERCIAL

## 2020-04-29 VITALS — BODY MASS INDEX: 37 KG/M2 | WEIGHT: 201 LBS

## 2020-04-29 DIAGNOSIS — Z71.89 DIABETES EDUCATION, ENCOUNTER FOR: ICD-10-CM

## 2020-04-29 NOTE — PROGRESS NOTES
Frank Given  DOB3/31/1960 attended Step 3 Group Diabetes Education Class:  Realtime audio-video visit via Haiku/Spreetales. Individual visit due to COVID 19 risk and no group class available in 2 months. Pt verbally consents to this audio-video visit.

## 2020-05-04 DIAGNOSIS — E11.9 TYPE 2 DIABETES MELLITUS WITHOUT COMPLICATION, WITHOUT LONG-TERM CURRENT USE OF INSULIN (HCC): ICD-10-CM

## 2020-05-04 RX ORDER — BLOOD SUGAR DIAGNOSTIC
STRIP MISCELLANEOUS
Qty: 50 STRIP | Refills: 0 | Status: SHIPPED | OUTPATIENT
Start: 2020-05-04 | End: 2020-05-26

## 2020-05-19 ENCOUNTER — NURSE TRIAGE (OUTPATIENT)
Dept: INTERNAL MEDICINE CLINIC | Facility: CLINIC | Age: 60
End: 2020-05-19

## 2020-05-19 ENCOUNTER — VIRTUAL PHONE E/M (OUTPATIENT)
Dept: INTERNAL MEDICINE CLINIC | Facility: CLINIC | Age: 60
End: 2020-05-19
Payer: COMMERCIAL

## 2020-05-19 DIAGNOSIS — B37.3 VAGINAL YEAST INFECTION: ICD-10-CM

## 2020-05-19 DIAGNOSIS — E78.2 MIXED HYPERLIPIDEMIA: ICD-10-CM

## 2020-05-19 DIAGNOSIS — E11.9 TYPE 2 DIABETES MELLITUS WITHOUT COMPLICATION, WITHOUT LONG-TERM CURRENT USE OF INSULIN (HCC): Primary | ICD-10-CM

## 2020-05-19 PROBLEM — B37.31 VAGINAL YEAST INFECTION: Status: ACTIVE | Noted: 2020-05-19

## 2020-05-19 PROCEDURE — 99214 OFFICE O/P EST MOD 30 MIN: CPT | Performed by: INTERNAL MEDICINE

## 2020-05-19 RX ORDER — FLUCONAZOLE 150 MG/1
150 TABLET ORAL ONCE
Qty: 1 TABLET | Refills: 1 | Status: SHIPPED | OUTPATIENT
Start: 2020-05-19 | End: 2020-05-19

## 2020-05-19 NOTE — TELEPHONE ENCOUNTER
Please reply to pool: EM RN TRIAGE    Action Requested: Summary for Provider     []  Critical Lab, Recommendations Needed  [] Need Additional Advice  []   FYI    []   Need Orders  [] Need Medications Sent to Pharmacy  []  Other     SUMMARY: offered tel

## 2020-05-19 NOTE — PROGRESS NOTES
Lucien Capps is a 61year old female. HPI:   1.  Type 2 diabetes mellitus without complication, without long-term current use of insulin ([facility]    The patient has been taking all prescribed diabetic medications at home and has been following a diabetic d Take 1 tablet (50 mg total) by mouth every 8 (eight) hours as needed for Pain. 30 tablet 0   • IRON 65 mg by Does not apply route daily.           Past Medical History:   Diagnosis Date   • Arthritis     legs   • Asthma     Stress induced   • High blood cho last    2. Mixed hyperlipidemia    Patient instructed to take cholesterol lowering medications as prescribed and to continue to follow a low fat, low cholesterol diet as discussed.  Discussed lifestyle modifications including reductions in dietary total and

## 2020-05-26 DIAGNOSIS — E11.9 TYPE 2 DIABETES MELLITUS WITHOUT COMPLICATION, WITHOUT LONG-TERM CURRENT USE OF INSULIN (HCC): ICD-10-CM

## 2020-05-26 RX ORDER — BLOOD SUGAR DIAGNOSTIC
STRIP MISCELLANEOUS
Qty: 50 STRIP | Refills: 0 | Status: SHIPPED | OUTPATIENT
Start: 2020-05-26 | End: 2020-08-18

## 2020-07-08 ENCOUNTER — TELEPHONE (OUTPATIENT)
Dept: INTERNAL MEDICINE CLINIC | Facility: CLINIC | Age: 60
End: 2020-07-08

## 2020-07-08 RX ORDER — VALACYCLOVIR HYDROCHLORIDE 1 G/1
2 TABLET, FILM COATED ORAL EVERY 12 HOURS SCHEDULED
Qty: 4 TABLET | Refills: 3 | Status: SHIPPED | OUTPATIENT
Start: 2020-07-08 | End: 2021-07-19

## 2020-07-08 NOTE — TELEPHONE ENCOUNTER
Pt states she has hx of cold sores and had received a prescription in the past from her former PCP for Valtrex. Pt states she feels cold sore coming on and her valtrex is . Pt is asking if Dr Jorje Umaña can prescribe Valtrex for her.     Please

## 2020-08-18 DIAGNOSIS — E11.9 TYPE 2 DIABETES MELLITUS WITHOUT COMPLICATION, WITHOUT LONG-TERM CURRENT USE OF INSULIN (HCC): ICD-10-CM

## 2020-08-18 RX ORDER — BLOOD SUGAR DIAGNOSTIC
STRIP MISCELLANEOUS
Qty: 50 STRIP | Refills: 0 | Status: SHIPPED | OUTPATIENT
Start: 2020-08-18 | End: 2020-11-19

## 2020-08-18 RX ORDER — EMPAGLIFLOZIN 10 MG/1
TABLET, FILM COATED ORAL
Qty: 30 TABLET | Refills: 0 | Status: SHIPPED | OUTPATIENT
Start: 2020-08-18 | End: 2020-08-24

## 2020-08-18 RX ORDER — CLOTRIMAZOLE 2 G/100G
CREAM VAGINAL
Qty: 21 G | Refills: 0 | Status: SHIPPED | OUTPATIENT
Start: 2020-08-18 | End: 2020-08-24

## 2020-08-24 ENCOUNTER — TELEPHONE (OUTPATIENT)
Dept: INTERNAL MEDICINE CLINIC | Facility: CLINIC | Age: 60
End: 2020-08-24

## 2020-08-24 RX ORDER — CLOTRIMAZOLE 2 G/100G
CREAM VAGINAL
Qty: 21 G | Refills: 0 | Status: SHIPPED | OUTPATIENT
Start: 2020-08-24 | End: 2021-10-26

## 2020-08-24 NOTE — TELEPHONE ENCOUNTER
Patient called back. States she continues to have genital rash and feeling uncomfortable when taking Jardiance. She states she has discontinued Jardiance today (med list updated) and will instead be taking her Metformin 500 mg, BID with meals.  Patient info

## 2020-08-25 ENCOUNTER — VIRTUAL PHONE E/M (OUTPATIENT)
Dept: INTERNAL MEDICINE CLINIC | Facility: CLINIC | Age: 60
End: 2020-08-25
Payer: COMMERCIAL

## 2020-08-25 DIAGNOSIS — E78.2 MIXED HYPERLIPIDEMIA: ICD-10-CM

## 2020-08-25 DIAGNOSIS — E11.9 TYPE 2 DIABETES MELLITUS WITHOUT COMPLICATION, WITHOUT LONG-TERM CURRENT USE OF INSULIN (HCC): Primary | ICD-10-CM

## 2020-08-25 PROCEDURE — 99214 OFFICE O/P EST MOD 30 MIN: CPT | Performed by: INTERNAL MEDICINE

## 2020-08-25 NOTE — PROGRESS NOTES
Patient ID: Melani Franklin is a 61year old female.          1. Type 2 diabetes mellitus without complication, without long-term current use of insulin [facility]    The patient has been taking all prescribed diabetic medications at home and has been following REPLACEMENT Right 08/2016   • TOTAL HIP REPLACEMENT Left 10/2016   • WRIST CARPAL TUNNEL RELEASE Right 12/19/2017    Performed by Mar Guardado MD at [facility] MAIN OR         Current Outpatient Medications:   •  3 DAY VAGINAL 2 % Vaginal Cream, PLACE 1 APPLIC insecurity:        Worry: Not on file        Inability: Not on file      Transportation needs:        Medical: Not on file        Non-medical: Not on file    Tobacco Use      Smoking status: Former Smoker        Packs/day: 1.00        Years: 12.00        P low cholesterol diet as discussed. Discussed lifestyle modifications including reductions in dietary total and saturated fat and eating a diet rich in fruits and vegetables.  Discussed decreasing alcohol consumption Try to exercise at least 3 times weekly t

## 2020-08-26 ENCOUNTER — APPOINTMENT (OUTPATIENT)
Dept: LAB | Age: 60
End: 2020-08-26
Attending: INTERNAL MEDICINE
Payer: COMMERCIAL

## 2020-08-26 LAB
EST. AVERAGE GLUCOSE BLD GHB EST-MCNC: 163 MG/DL (ref 68–126)
HBA1C MFR BLD HPLC: 7.3 % (ref ?–5.7)

## 2020-08-26 PROCEDURE — 36415 COLL VENOUS BLD VENIPUNCTURE: CPT | Performed by: INTERNAL MEDICINE

## 2020-08-26 PROCEDURE — 83036 HEMOGLOBIN GLYCOSYLATED A1C: CPT | Performed by: INTERNAL MEDICINE

## 2020-08-27 ENCOUNTER — TELEPHONE (OUTPATIENT)
Dept: INTERNAL MEDICINE CLINIC | Facility: CLINIC | Age: 60
End: 2020-08-27

## 2020-08-27 NOTE — TELEPHONE ENCOUNTER
Pt states read the A1c result on MyChart (copied below) but Dr Sofya Rubio had stated might change diabetes medication depending on A1c result. Currently just on Metformin 500 mg twice daily.  Please advise    Please reply to pool: ANNA Lopez by PAMELA

## 2020-09-01 NOTE — TELEPHONE ENCOUNTER
Spoke with the patient who reports her recent blood sugar readings are as follows:    9/1/20 152 fasting  8/31/20 177 fasting  8/30/20 128 fasting  8/29/20  136 fasting   8/28/20  129 fasting  8/27/20  140 fasting  8/26/20  118 fasting  8/25/20 122 fasting

## 2020-10-16 ENCOUNTER — TELEPHONE (OUTPATIENT)
Dept: INTERNAL MEDICINE CLINIC | Facility: CLINIC | Age: 60
End: 2020-10-16

## 2020-10-16 DIAGNOSIS — Z12.31 ENCOUNTER FOR SCREENING MAMMOGRAM FOR MALIGNANT NEOPLASM OF BREAST: Primary | ICD-10-CM

## 2020-10-24 ENCOUNTER — APPOINTMENT (OUTPATIENT)
Dept: MAMMOGRAPHY | Age: 60
End: 2020-10-24
Attending: SPECIALIST

## 2020-10-30 ENCOUNTER — HOSPITAL ENCOUNTER (OUTPATIENT)
Dept: MAMMOGRAPHY | Age: 60
Discharge: HOME | End: 2020-10-30
Attending: SPECIALIST

## 2020-10-30 DIAGNOSIS — Z12.31 ENCOUNTER FOR SCREENING MAMMOGRAM FOR MALIGNANT NEOPLASM OF BREAST: ICD-10-CM

## 2020-10-30 PROCEDURE — 77063 BREAST TOMOSYNTHESIS BI: CPT

## 2020-11-18 DIAGNOSIS — E11.9 TYPE 2 DIABETES MELLITUS WITHOUT COMPLICATION, WITHOUT LONG-TERM CURRENT USE OF INSULIN (HCC): ICD-10-CM

## 2020-11-19 RX ORDER — BLOOD SUGAR DIAGNOSTIC
STRIP MISCELLANEOUS
Qty: 100 STRIP | Refills: 0 | Status: SHIPPED | OUTPATIENT
Start: 2020-11-19 | End: 2021-03-15

## 2020-12-29 ENCOUNTER — TELEPHONE (OUTPATIENT)
Dept: INTERNAL MEDICINE CLINIC | Facility: CLINIC | Age: 60
End: 2020-12-29

## 2020-12-29 NOTE — TELEPHONE ENCOUNTER
Pt states is diabetic and blood sugar readings have been higher in past few months; declines office visit unless Dr states needed.  States was previously on Jardiance but it was causing yeast infections so  was placed back on Metformin at same previou

## 2021-03-15 DIAGNOSIS — E11.9 TYPE 2 DIABETES MELLITUS WITHOUT COMPLICATION, WITHOUT LONG-TERM CURRENT USE OF INSULIN (HCC): ICD-10-CM

## 2021-03-15 RX ORDER — BLOOD SUGAR DIAGNOSTIC
STRIP MISCELLANEOUS
Qty: 100 STRIP | Refills: 0 | Status: SHIPPED | OUTPATIENT
Start: 2021-03-15 | End: 2021-06-07

## 2021-03-15 RX ORDER — ALBUTEROL SULFATE 90 UG/1
AEROSOL, METERED RESPIRATORY (INHALATION)
Qty: 25.5 G | Refills: 1 | Status: SHIPPED | OUTPATIENT
Start: 2021-03-15 | End: 2021-06-07

## 2021-03-20 DIAGNOSIS — Z23 NEED FOR VACCINATION: ICD-10-CM

## 2021-04-08 RX ORDER — EMPAGLIFLOZIN 10 MG/1
TABLET, FILM COATED ORAL
Qty: 30 TABLET | Refills: 0 | Status: SHIPPED | OUTPATIENT
Start: 2021-04-08 | End: 2021-06-22

## 2021-04-26 ENCOUNTER — TELEPHONE (OUTPATIENT)
Dept: INTERNAL MEDICINE CLINIC | Facility: CLINIC | Age: 61
End: 2021-04-26

## 2021-04-26 NOTE — TELEPHONE ENCOUNTER
----- Message -----       From:Morenita Ryan       Sent:4/26/2021 10:29 AM CDT         To:Patient Customer Service Request Message List    Subject:prescription request    Topic: Selection    I would like a prescription to be called in for Diflucan.   Im e

## 2021-04-27 RX ORDER — FLUCONAZOLE 150 MG/1
150 TABLET ORAL ONCE
Qty: 1 TABLET | Refills: 1 | Status: SHIPPED | OUTPATIENT
Start: 2021-04-27 | End: 2021-04-27

## 2021-04-27 NOTE — TELEPHONE ENCOUNTER
Patient states she suffers from yeast infections, especially being on Jardiance. Re-started this medication 3 weeks ago. Reported light itching and strong urine odor. Patient is asking for Diflucan to be sent to [facility] in [place].     Please Advise-

## 2021-04-27 NOTE — TELEPHONE ENCOUNTER
Spoke with the patient,verified full name and , informed patient of Dr. Marcio Thurston message,no further questions

## 2021-05-04 RX ORDER — FLUCONAZOLE 150 MG/1
150 TABLET ORAL ONCE
Qty: 1 TABLET | Refills: 5 | Status: SHIPPED | OUTPATIENT
Start: 2021-05-04 | End: 2021-05-04

## 2021-06-07 DIAGNOSIS — E11.9 TYPE 2 DIABETES MELLITUS WITHOUT COMPLICATION, WITHOUT LONG-TERM CURRENT USE OF INSULIN (HCC): ICD-10-CM

## 2021-06-07 RX ORDER — BLOOD SUGAR DIAGNOSTIC
STRIP MISCELLANEOUS
Qty: 100 STRIP | Refills: 0 | Status: SHIPPED | OUTPATIENT
Start: 2021-06-07 | End: 2022-01-14

## 2021-06-07 RX ORDER — ALBUTEROL SULFATE 90 UG/1
1 AEROSOL, METERED RESPIRATORY (INHALATION) EVERY 6 HOURS PRN
Qty: 25.5 G | Refills: 1 | Status: SHIPPED | OUTPATIENT
Start: 2021-06-07 | End: 2021-11-09

## 2021-06-15 NOTE — TELEPHONE ENCOUNTER
Rosalio Jaime from [address] [place] [address] states they have faxed over multiple times a request to change the [facility]. Insurance will not pay for it.   She will refax to

## 2021-06-22 NOTE — TELEPHONE ENCOUNTER
Patient called and she will schedule an appointment but in the mean time she'll need a refill because she took her last pill today and will be going out of town tomorrow

## 2021-06-22 NOTE — TELEPHONE ENCOUNTER
Pt made an appointment to establish care/refill with Dr. Jf Hernandez on 7-21-21 at 9:40. Per pt she is out of her medication and would like to know if a refill can be sent to the pharmacy. Pt can be reached at [phone].

## 2021-06-24 RX ORDER — EMPAGLIFLOZIN 10 MG/1
TABLET, FILM COATED ORAL
Qty: 30 TABLET | Refills: 0 | OUTPATIENT
Start: 2021-06-24

## 2021-06-24 NOTE — TELEPHONE ENCOUNTER
Appointment scheduled for 7/21      Last Read in [address]   6/22/2021  1:29 PM by Yves Salcedo

## 2021-07-19 RX ORDER — VALACYCLOVIR HYDROCHLORIDE 1 G/1
2 TABLET, FILM COATED ORAL EVERY 12 HOURS SCHEDULED
Qty: 4 TABLET | Refills: 3 | Status: SHIPPED | OUTPATIENT
Start: 2021-07-19

## 2021-07-19 NOTE — TELEPHONE ENCOUNTER
Pharmacy is requesting a refill on the following medications. Previous patient of Dr. Lew Mancini.     Medication Detail    Medication Quantity Refills Start End   empagliflozin (JARDIANCE) 10 MG Oral Tab 30 tablet 0 6/22/2021    Sig:   Take 1 tablet (10 mg t

## 2021-07-21 ENCOUNTER — OFFICE VISIT (OUTPATIENT)
Dept: INTERNAL MEDICINE CLINIC | Facility: CLINIC | Age: 61
End: 2021-07-21
Payer: COMMERCIAL

## 2021-07-21 VITALS
SYSTOLIC BLOOD PRESSURE: 110 MMHG | BODY MASS INDEX: 37.17 KG/M2 | WEIGHT: 202 LBS | DIASTOLIC BLOOD PRESSURE: 60 MMHG | OXYGEN SATURATION: 99 % | HEIGHT: 62 IN | HEART RATE: 96 BPM | RESPIRATION RATE: 16 BRPM

## 2021-07-21 DIAGNOSIS — Z00.00 ADULT GENERAL MEDICAL EXAM: ICD-10-CM

## 2021-07-21 DIAGNOSIS — E78.2 MIXED HYPERLIPIDEMIA: ICD-10-CM

## 2021-07-21 DIAGNOSIS — E11.9 TYPE 2 DIABETES MELLITUS WITHOUT COMPLICATION, WITHOUT LONG-TERM CURRENT USE OF INSULIN (HCC): Primary | ICD-10-CM

## 2021-07-21 DIAGNOSIS — J45.909 MILD ASTHMA WITHOUT COMPLICATION, UNSPECIFIED WHETHER PERSISTENT: ICD-10-CM

## 2021-07-21 PROBLEM — B37.31 VAGINAL YEAST INFECTION: Status: RESOLVED | Noted: 2020-05-19 | Resolved: 2021-07-21

## 2021-07-21 PROBLEM — Z23 NEED FOR VACCINATION: Status: RESOLVED | Noted: 2020-02-11 | Resolved: 2021-07-21

## 2021-07-21 PROBLEM — B37.3 VAGINAL YEAST INFECTION: Status: RESOLVED | Noted: 2020-05-19 | Resolved: 2021-07-21

## 2021-07-21 PROCEDURE — 3078F DIAST BP <80 MM HG: CPT | Performed by: INTERNAL MEDICINE

## 2021-07-21 PROCEDURE — 3008F BODY MASS INDEX DOCD: CPT | Performed by: INTERNAL MEDICINE

## 2021-07-21 PROCEDURE — 99214 OFFICE O/P EST MOD 30 MIN: CPT | Performed by: INTERNAL MEDICINE

## 2021-07-21 PROCEDURE — 3074F SYST BP LT 130 MM HG: CPT | Performed by: INTERNAL MEDICINE

## 2021-07-21 RX ORDER — FLUCONAZOLE 150 MG/1
150 TABLET ORAL ONCE
COMMUNITY
Start: 2021-07-04 | End: 2021-10-14

## 2021-07-21 NOTE — PATIENT INSTRUCTIONS
Please call your gynecologist and let them fax the Pap smear report to us. Please call your gastroenterologist for the colonoscopy report. I can see that you have completed your colonoscopy in February 2019. I am not able to get the report.       Melo

## 2021-07-21 NOTE — PROGRESS NOTES
Arpita Noyola is a 64year old female. Patient presents with:  Establish Care: NP here to establish care     HPI:   71-year-old pleasant lady with a past medical history of diabetes, dyslipidemia, asthma here to establish care and for follow-up.   She was yeast infection 2020      Past Surgical History:   Procedure Laterality Date   •      •  DELIVERY ONLY      x3   • GASTRIC BYPASS,OBESITY,SB RECONSTRUC     • HERNIA SURGERY     • OTHER      gastric bypass   • OTHER SURGICAL HISTORY Psychiatric/Behavioral: Negative for agitation, behavioral problems, confusion and hallucinations.       Wt Readings from Last 5 Encounters:  07/21/21 : 202 lb (91.6 kg)  04/29/20 : 201 lb (91.2 kg)  02/18/20 : 215 lb 12.8 oz (97.9 kg)  02/07/20 : 217 lb Thought Content: Thought content normal.         Judgment: Judgment normal.            ASSESSMENT AND PLAN:   1.  Type 2 diabetes mellitus without complication, without long-term current use of insulin (HCC)  Currently she is taking Metformin 500 mg once a

## 2021-07-23 ENCOUNTER — TELEPHONE (OUTPATIENT)
Dept: INTERNAL MEDICINE CLINIC | Facility: CLINIC | Age: 61
End: 2021-07-23

## 2021-08-19 RX ORDER — EMPAGLIFLOZIN 10 MG/1
TABLET, FILM COATED ORAL
Qty: 30 TABLET | Refills: 0 | Status: SHIPPED | OUTPATIENT
Start: 2021-08-19 | End: 2021-09-19

## 2021-08-28 ENCOUNTER — HOSPITAL ENCOUNTER (OUTPATIENT)
Age: 61
Discharge: HOME | End: 2021-08-28
Attending: PHYSICIAN ASSISTANT
Payer: COMMERCIAL

## 2021-08-28 VITALS
HEART RATE: 88 BPM | RESPIRATION RATE: 18 BRPM | OXYGEN SATURATION: 96 % | DIASTOLIC BLOOD PRESSURE: 75 MMHG | TEMPERATURE: 97 F | SYSTOLIC BLOOD PRESSURE: 138 MMHG

## 2021-08-28 DIAGNOSIS — N39.0 URINARY TRACT INFECTION WITHOUT HEMATURIA, SITE UNSPECIFIED: Primary | ICD-10-CM

## 2021-08-28 LAB
BILIRUB UR QL STRIP: NEGATIVE
CLARITY UR: CLEAR
COLOR UR: YELLOW
GLUCOSE BLDC GLUCOMTR-MCNC: 114 MG/DL (ref 70–99)
GLUCOSE UR STRIP-MCNC: >=1000 MG/DL
HGB UR QL STRIP: NEGATIVE
KETONES UR STRIP-MCNC: NEGATIVE MG/DL
LEUKOCYTE ESTERASE UR QL STRIP: NEGATIVE
NITRITE UR QL STRIP: NEGATIVE
PH UR STRIP: 5.5 [PH]
PROT UR STRIP-MCNC: NEGATIVE MG/DL
SP GR UR STRIP: 1.01
UROBILINOGEN UR STRIP-ACNC: <2 MG/DL

## 2021-08-28 PROCEDURE — 99214 OFFICE O/P EST MOD 30 MIN: CPT

## 2021-08-28 PROCEDURE — 99213 OFFICE O/P EST LOW 20 MIN: CPT

## 2021-08-28 PROCEDURE — 81002 URINALYSIS NONAUTO W/O SCOPE: CPT

## 2021-08-28 PROCEDURE — 87086 URINE CULTURE/COLONY COUNT: CPT | Performed by: PHYSICIAN ASSISTANT

## 2021-08-28 PROCEDURE — 82962 GLUCOSE BLOOD TEST: CPT

## 2021-08-28 RX ORDER — CEPHALEXIN 500 MG/1
500 CAPSULE ORAL 3 TIMES DAILY
Qty: 21 CAPSULE | Refills: 0 | Status: SHIPPED | OUTPATIENT
Start: 2021-08-28 | End: 2021-09-04

## 2021-08-28 RX ORDER — FLUCONAZOLE 150 MG/1
150 TABLET ORAL ONCE
Qty: 2 TABLET | Refills: 0 | Status: SHIPPED | OUTPATIENT
Start: 2021-08-28 | End: 2021-08-28

## 2021-08-28 RX ORDER — BUDESONIDE AND FORMOTEROL FUMARATE DIHYDRATE 80; 4.5 UG/1; UG/1
AEROSOL RESPIRATORY (INHALATION)
COMMUNITY
End: 2021-10-26

## 2021-08-28 NOTE — ED PROVIDER NOTES
Patient Seen in: [facility]    History   Patient presents with:  Urinary Symptoms    Stated Complaint: URINE TEST    HPI    Arpita Noyola is a 64year old female who presents with chief complaint of dysuria. Onset 2 days ago.   Pain scale 4 (six) hours as needed for Wheezing. Glucose Blood (ONETOUCH VERIO) In Vitro Strip,  Use 1 strip 2 times daily   3 DAY VAGINAL 2 % Vaginal Cream,  PLACE 1 APPLICATION VAGINALLY NIGHTLY.    [address] w/Device Does not apply Kit,  1 each by provider. Physical Exam    Constitutional: The patient is cooperative. Appears well-developed and well-nourished. No acute distress. Psychological: Alert, No abnormalities of mood, affect. Head: Normocephalic/atraumatic.   Eyes: Pupils are equal r arise and to go to the ER for new, worsening or any persistent conditions. I've explained the importance of following up with their doctor as instructed. The patient verbalized understanding of the discharge instructions and plan.       Disposition and Plan

## 2021-08-28 NOTE — ED INITIAL ASSESSMENT (HPI)
Pt states she feels she has recurrent UTI d/t symptoms of discomfort, itchiness,urgency, slight odor and frequency. She had UTI 2-3 weeks ago. Took old prescription of cipro from 2018.

## 2021-09-19 RX ORDER — EMPAGLIFLOZIN 10 MG/1
TABLET, FILM COATED ORAL
Qty: 30 TABLET | Refills: 0 | Status: SHIPPED | OUTPATIENT
Start: 2021-09-19 | End: 2021-10-14

## 2021-09-20 NOTE — TELEPHONE ENCOUNTER
Please review Protocol Failed/No Protocol        Requested Prescriptions   Pending Prescriptions Disp Refills    JARDIANCE 10 MG Oral Tab [Pharmacy Med Name: Jardiance 10 Mg Tab [facility]] 30 tablet 0     Sig: Take 1 tablet (10 mg total) by mouth daily.

## 2021-09-23 NOTE — TELEPHONE ENCOUNTER
The patient stated she is not going to take the Jardiance. She stated she is trying to get her vaginal irritation under control. She stated the [facility] does make her blood sugars go down but gives her vaginal irritation.   On 8/289/21 she was in th

## 2021-09-24 ENCOUNTER — PATIENT MESSAGE (OUTPATIENT)
Dept: OTHER | Age: 61
End: 2021-09-24

## 2021-09-24 DIAGNOSIS — Z00.00 ADULT GENERAL MEDICAL EXAM: Primary | ICD-10-CM

## 2021-09-24 NOTE — TELEPHONE ENCOUNTER
I have reordered blood testing for her. It appears that she needs to do blood test in [facility]. Please call patient and fax it to her nearby [facility]. Please let her continue with the Metformin.   When I reviewed the chart, she suppos

## 2021-09-25 NOTE — TELEPHONE ENCOUNTER
From: Sheron Murillo  To: Arpita Noyola  Sent: 9/24/2021 4:18 PM CDT  Subject: medication       Snaket Bucio,     Per Dr. Yoselin Murdock:    I have ordered you blood test for [facility].  Please proceed to a [facility] to have completed     Please continue with the

## 2021-09-25 NOTE — TELEPHONE ENCOUNTER
Site staff for assistance, do we have order form for lab gonzales?  thanks.            Future Appointments   Date Time Provider Jason Orta   10/26/2021 10:00 AM Nkechi Bunch MD [facility] no

## 2021-09-30 NOTE — TELEPHONE ENCOUNTER
Patient calling asking for orders to be faxed to [facility] in [facility]-- phone number [phone]. Fax number: [phone]. Lab orders faxed, [employer] message sent to inform patient.

## 2021-10-05 ENCOUNTER — TELEPHONE (OUTPATIENT)
Dept: INTERNAL MEDICINE CLINIC | Facility: CLINIC | Age: 61
End: 2021-10-05

## 2021-10-05 PROCEDURE — 3061F NEG MICROALBUMINURIA REV: CPT | Performed by: INTERNAL MEDICINE

## 2021-10-05 PROCEDURE — 3052F HG A1C>EQUAL 8.0%<EQUAL 9.0%: CPT | Performed by: INTERNAL MEDICINE

## 2021-10-14 ENCOUNTER — TELEPHONE (OUTPATIENT)
Dept: INTERNAL MEDICINE CLINIC | Facility: CLINIC | Age: 61
End: 2021-10-14

## 2021-10-14 RX ORDER — ATORVASTATIN CALCIUM 40 MG/1
40 TABLET, FILM COATED ORAL
Qty: 90 TABLET | Refills: 1 | Status: SHIPPED | OUTPATIENT
Start: 2021-10-14 | End: 2021-10-15

## 2021-10-14 RX ORDER — GLYBURIDE 5 MG/1
5 TABLET ORAL
Qty: 90 TABLET | Refills: 1 | Status: SHIPPED | OUTPATIENT
Start: 2021-10-14 | End: 2021-10-26

## 2021-10-14 NOTE — TELEPHONE ENCOUNTER
Labs reviewed  Hb Aic  High   LDL high   Discussed with the patient. She is only taking Metformin 500 mg 2 tablets once at night. She does not want to take Jardiance because of yeast infections. She was not taking her Crestor.     Plan  She does not want

## 2021-10-15 ENCOUNTER — TELEPHONE (OUTPATIENT)
Dept: INTERNAL MEDICINE CLINIC | Facility: CLINIC | Age: 61
End: 2021-10-15

## 2021-10-15 RX ORDER — ATORVASTATIN CALCIUM 40 MG/1
40 TABLET, FILM COATED ORAL DAILY
Qty: 90 TABLET | Refills: 1 | Status: SHIPPED | OUTPATIENT
Start: 2021-10-15 | End: 2022-04-21

## 2021-10-26 ENCOUNTER — OFFICE VISIT (OUTPATIENT)
Dept: INTERNAL MEDICINE CLINIC | Facility: CLINIC | Age: 61
End: 2021-10-26
Payer: COMMERCIAL

## 2021-10-26 VITALS
RESPIRATION RATE: 16 BRPM | WEIGHT: 207 LBS | SYSTOLIC BLOOD PRESSURE: 126 MMHG | HEART RATE: 92 BPM | BODY MASS INDEX: 38.09 KG/M2 | OXYGEN SATURATION: 98 % | HEIGHT: 62 IN | DIASTOLIC BLOOD PRESSURE: 80 MMHG

## 2021-10-26 DIAGNOSIS — Z23 NEED FOR INFLUENZA VACCINATION: ICD-10-CM

## 2021-10-26 DIAGNOSIS — R22.2 SUPRACLAVICULAR FOSSA FULLNESS: ICD-10-CM

## 2021-10-26 DIAGNOSIS — Z00.00 ADULT GENERAL MEDICAL EXAM: Primary | ICD-10-CM

## 2021-10-26 DIAGNOSIS — E11.9 TYPE 2 DIABETES MELLITUS WITHOUT COMPLICATION, WITHOUT LONG-TERM CURRENT USE OF INSULIN (HCC): ICD-10-CM

## 2021-10-26 DIAGNOSIS — Z12.31 BREAST CANCER SCREENING BY MAMMOGRAM: ICD-10-CM

## 2021-10-26 PROCEDURE — 3008F BODY MASS INDEX DOCD: CPT | Performed by: INTERNAL MEDICINE

## 2021-10-26 PROCEDURE — 90686 IIV4 VACC NO PRSV 0.5 ML IM: CPT | Performed by: INTERNAL MEDICINE

## 2021-10-26 PROCEDURE — 99396 PREV VISIT EST AGE 40-64: CPT | Performed by: INTERNAL MEDICINE

## 2021-10-26 PROCEDURE — 3079F DIAST BP 80-89 MM HG: CPT | Performed by: INTERNAL MEDICINE

## 2021-10-26 PROCEDURE — 3074F SYST BP LT 130 MM HG: CPT | Performed by: INTERNAL MEDICINE

## 2021-10-26 PROCEDURE — 90471 IMMUNIZATION ADMIN: CPT | Performed by: INTERNAL MEDICINE

## 2021-10-26 RX ORDER — GLYBURIDE 5 MG/1
10 TABLET ORAL
Qty: 180 TABLET | Refills: 1 | Status: SHIPPED | OUTPATIENT
Start: 2021-10-26 | End: 2022-01-24

## 2021-10-26 NOTE — PROGRESS NOTES
Dustin Coker is a 64year old female. Patient presents with:  Physical  Injection: Will like F/u Vaccine     HPI:   70-year-old pleasant lady with a medical history significant for diabetes, dyslipidemia, DJD here for physical.  She is here for follow-up      •  DELIVERY ONLY      x3   • GASTRIC BYPASS,OBESITY,SB RECONSTRUC     • HERNIA SURGERY     • OTHER      gastric bypass   • OTHER SURGICAL HISTORY      hernia repair/tummy tuck   • OTHER SURGICAL HISTORY      hernia repair   • TOTAL HI behavioral problems, confusion and hallucinations.       Wt Readings from Last 5 Encounters:  10/26/21 : 207 lb (93.9 kg)  07/21/21 : 202 lb (91.6 kg)  04/29/20 : 201 lb (91.2 kg)  02/18/20 : 215 lb 12.8 oz (97.9 kg)  02/07/20 : 217 lb (98.4 kg)    Body mas Judgment: Judgment normal.     Supraclavicular fullness on the right side. I am not able to palpate any masses. ASSESSMENT AND PLAN:   1. Adult general medical exam  Encourage patient to eat healthy with fruits and vegetables.   Avoid too much of swee follow-ups on file.

## 2021-11-01 ENCOUNTER — IMMUNIZATION (OUTPATIENT)
Dept: LAB | Facility: HOSPITAL | Age: 61
End: 2021-11-01
Attending: EMERGENCY MEDICINE
Payer: COMMERCIAL

## 2021-11-01 DIAGNOSIS — Z23 NEED FOR VACCINATION: Primary | ICD-10-CM

## 2021-11-01 PROCEDURE — 0004A SARSCOV2 VAC 30MCG/0.3ML IM: CPT

## 2021-11-09 RX ORDER — ALBUTEROL SULFATE 90 UG/1
1 AEROSOL, METERED RESPIRATORY (INHALATION) EVERY 6 HOURS PRN
Qty: 25.5 G | Refills: 1 | Status: SHIPPED | OUTPATIENT
Start: 2021-11-09 | End: 2022-01-14

## 2021-11-09 NOTE — TELEPHONE ENCOUNTER
Refill passed per [facility] protocol, but last ordered by Dr. Shona Pruitt 6/07/2021    Please send, if appropriate      Requested Prescriptions   Pending Prescriptions Disp Refills    albuterol 108 (90 Base) MCG/ACT Inhalation Aero Soln 25.5 g 1     Si

## 2021-11-09 NOTE — TELEPHONE ENCOUNTER
Kiah/ Pharmacy Technician of [address] [place] [address] is requesting refill of patient's medication Albuterol Sulfate  (90 Base) MCG/ACT Inhalation Aero Soln.

## 2022-01-12 ENCOUNTER — TELEPHONE (OUTPATIENT)
Dept: INTERNAL MEDICINE CLINIC | Facility: CLINIC | Age: 62
End: 2022-01-12

## 2022-01-12 DIAGNOSIS — E11.9 TYPE 2 DIABETES MELLITUS WITHOUT COMPLICATION, WITHOUT LONG-TERM CURRENT USE OF INSULIN (HCC): Primary | ICD-10-CM

## 2022-01-12 NOTE — TELEPHONE ENCOUNTER
pt. requesting to get a new order entered and sent to [employer], as [employer] will not cover [employer].

## 2022-01-13 NOTE — TELEPHONE ENCOUNTER
Phoned patient for [facility] location preference.    Order faxed to Casimiro Welsh in [facility] [place] @ [phone] with confirmation

## 2022-01-14 DIAGNOSIS — E11.9 TYPE 2 DIABETES MELLITUS WITHOUT COMPLICATION, WITHOUT LONG-TERM CURRENT USE OF INSULIN (HCC): ICD-10-CM

## 2022-01-14 RX ORDER — CALCIUM CITRATE/VITAMIN D3 200MG-6.25
TABLET ORAL
Qty: 100 STRIP | Refills: 3 | Status: SHIPPED | OUTPATIENT
Start: 2022-01-14 | End: 2022-03-02

## 2022-01-14 RX ORDER — ALBUTEROL SULFATE 90 UG/1
1 AEROSOL, METERED RESPIRATORY (INHALATION) EVERY 6 HOURS PRN
Qty: 25.5 G | Refills: 2 | Status: SHIPPED | OUTPATIENT
Start: 2022-01-14 | End: 2022-01-24

## 2022-01-14 NOTE — TELEPHONE ENCOUNTER
Refill passed per [facility] protocol.       Requested Prescriptions   Pending Prescriptions Disp Refills    TRUE METRIX BLOOD GLUCOSE TEST In Vitro Strip [facility] Name: True Metrix Blood Glucosetest Strips Brittny Triv]  0     Sig: TEST BLOOD SUGAR ONCE A DAY        Diabetic Supplies Protocol Passed - 1/14/2022  2:40 PM        Passed - Appointment in past 12 or next 3 months               Future Appointments         Provider Department Appt Notes    In 1 week Javed Allen MD [facility], [address] [facility] Paris Vazquez follow up   policy informed    In 2 weeks [address] [facility]     In 2 weeks [facility] [address] [facility]     In 1 month Nettie Sims MD [facility] Ophthalmology NP DM EE             Recent Outpatient Visits              2 months ago Adult general medical exam    [address] [facility], Yuliet Strauss MD    Office Visit    5 months ago Type 2 diabetes mellitus without complication, without long-term current use of insulin [facility])    [facility], [address], Yuliet Strauss MD    Office Visit    1 year ago Type 2 diabetes mellitus without complication, without long-term current use of insulin [facility])    [facility], [address] Francetta August, MD    Virtual Phone E/M    1 year ago Type 2 diabetes mellitus without complication, without long-term current use of insulin [facility])    [facility], [address] Francetta August, MD    Virtual Phone E/M    1 year ago Type 2 diabetes mellitus without complication, without long-term current use of insulin [facility])    [facility], [address], Kamlesh Ayoub MD    Virtual Phone E/M

## 2022-01-14 NOTE — TELEPHONE ENCOUNTER
Refill passed per [place] [facility] protocol. Requested Prescriptions   Pending Prescriptions Disp Refills    ALBUTEROL 108 (90 Base) MCG/ACT Inhalation Aero Soln [Pharmacy Med Name: Albuterol Sulfate Hfa 108 Mcg/Act Aer Lupi] 25.5 g 0     Sig: Inhale 1 puff into the lungs every 6 hours as needed for Wheezing.         Asthma & COPD Medication Protocol Passed - 1/14/2022  3:06 PM        Passed - Appointment in past 6 or next 3 months               Future Appointments         Provider Department Appt Notes    In 1 week [facility], [address] follow up   policy informed    In 2 weeks [address] [facility]     In 2 weeks [facility] [address] [facility]     In 1 month Mihir Haynes MD [facility] Ophthalmology NP DM EE             Recent Outpatient Visits              2 months ago Adult general medical exam    Alexei Hernandez MD    Office Visit    5 months ago Type 2 diabetes mellitus without complication, without long-term current use of insulin [facility])    [facility], [address], Alexei Chavarria MD    Office Visit    1 year ago Type 2 diabetes mellitus without complication, without long-term current use of insulin [facility])    [facility], [address] MD    Virtual Phone E/M    1 year ago Type 2 diabetes mellitus without complication, without long-term current use of insulin [facility])    [facility], [address] MD    Virtual Phone E/M    1 year ago Type 2 diabetes mellitus without complication, without long-term current use of insulin [facility])    [facility], [address], Keyona Ayoub MD    Virtual Phone E/M

## 2022-01-24 ENCOUNTER — OFFICE VISIT (OUTPATIENT)
Dept: INTERNAL MEDICINE CLINIC | Facility: CLINIC | Age: 62
End: 2022-01-24
Payer: COMMERCIAL

## 2022-01-24 VITALS
SYSTOLIC BLOOD PRESSURE: 130 MMHG | RESPIRATION RATE: 14 BRPM | DIASTOLIC BLOOD PRESSURE: 80 MMHG | HEART RATE: 88 BPM | WEIGHT: 209 LBS | BODY MASS INDEX: 38.46 KG/M2 | OXYGEN SATURATION: 97 % | HEIGHT: 62 IN

## 2022-01-24 DIAGNOSIS — J45.909 MILD ASTHMA WITHOUT COMPLICATION, UNSPECIFIED WHETHER PERSISTENT: ICD-10-CM

## 2022-01-24 DIAGNOSIS — E78.2 MIXED HYPERLIPIDEMIA: ICD-10-CM

## 2022-01-24 DIAGNOSIS — E11.9 TYPE 2 DIABETES MELLITUS WITHOUT COMPLICATION, WITHOUT LONG-TERM CURRENT USE OF INSULIN (HCC): Primary | ICD-10-CM

## 2022-01-24 PROCEDURE — 3075F SYST BP GE 130 - 139MM HG: CPT | Performed by: INTERNAL MEDICINE

## 2022-01-24 PROCEDURE — 3008F BODY MASS INDEX DOCD: CPT | Performed by: INTERNAL MEDICINE

## 2022-01-24 PROCEDURE — 3079F DIAST BP 80-89 MM HG: CPT | Performed by: INTERNAL MEDICINE

## 2022-01-24 PROCEDURE — 99214 OFFICE O/P EST MOD 30 MIN: CPT | Performed by: INTERNAL MEDICINE

## 2022-01-24 RX ORDER — ALBUTEROL SULFATE 90 UG/1
2 AEROSOL, METERED RESPIRATORY (INHALATION) EVERY 6 HOURS PRN
Qty: 1 EACH | Refills: 3 | Status: SHIPPED | OUTPATIENT
Start: 2022-01-24 | End: 2022-02-23

## 2022-01-24 RX ORDER — MONTELUKAST SODIUM 10 MG/1
10 TABLET ORAL DAILY
Qty: 90 TABLET | Refills: 1 | Status: SHIPPED | OUTPATIENT
Start: 2022-01-24 | End: 2022-04-24

## 2022-01-24 NOTE — PROGRESS NOTES
Patricia Calloway is a 64year old female. Patient presents with: Follow - Up: 3 mos F/u   Diabetes    HPI:   57-year-old pleasant lady here for follow-up. She reported she is doing okay.   Her fasting blood sugars are good however postprandial blood sugars OTHER SURGICAL HISTORY      hernia repair   • TOTAL HIP REPLACEMENT Right 08/2016   • TOTAL HIP REPLACEMENT Left 10/2016      Social History:  Social History    Tobacco Use      Smoking status: Former Smoker        Packs/day: 1.00        Years: 12.00 Conjunctivae normal.   Cardiovascular:      Rate and Rhythm: Normal rate and regular rhythm. Heart sounds: Normal heart sounds. No murmur heard. Pulmonary:      Effort: Pulmonary effort is normal.      Breath sounds: Normal breath sounds.  No rhonchi As a result errors may occur. When identified these errors have been corrected.  While every attempt is made to correct errors during dictation discrepancies may still exist.

## 2022-01-28 ENCOUNTER — HOSPITAL ENCOUNTER (OUTPATIENT)
Dept: MAMMOGRAPHY | Facility: HOSPITAL | Age: 62
Discharge: HOME | End: 2022-01-28
Attending: INTERNAL MEDICINE
Payer: COMMERCIAL

## 2022-01-28 ENCOUNTER — HOSPITAL ENCOUNTER (OUTPATIENT)
Dept: ULTRASOUND IMAGING | Facility: HOSPITAL | Age: 62
Discharge: HOME | End: 2022-01-28
Attending: INTERNAL MEDICINE
Payer: COMMERCIAL

## 2022-01-28 DIAGNOSIS — R22.2 SUPRACLAVICULAR FOSSA FULLNESS: ICD-10-CM

## 2022-01-28 DIAGNOSIS — Z12.31 BREAST CANCER SCREENING BY MAMMOGRAM: ICD-10-CM

## 2022-01-28 PROCEDURE — 77067 SCR MAMMO BI INCL CAD: CPT | Performed by: INTERNAL MEDICINE

## 2022-01-28 PROCEDURE — 77063 BREAST TOMOSYNTHESIS BI: CPT | Performed by: INTERNAL MEDICINE

## 2022-01-28 PROCEDURE — 76536 US EXAM OF HEAD AND NECK: CPT | Performed by: INTERNAL MEDICINE

## 2022-02-09 ENCOUNTER — OFFICE VISIT (OUTPATIENT)
Dept: ENDOCRINOLOGY CLINIC | Facility: CLINIC | Age: 62
End: 2022-02-09
Payer: COMMERCIAL

## 2022-02-09 VITALS
DIASTOLIC BLOOD PRESSURE: 76 MMHG | HEART RATE: 78 BPM | SYSTOLIC BLOOD PRESSURE: 121 MMHG | WEIGHT: 212.38 LBS | BODY MASS INDEX: 39 KG/M2

## 2022-02-09 DIAGNOSIS — E11.9 TYPE 2 DIABETES MELLITUS WITHOUT COMPLICATION, WITHOUT LONG-TERM CURRENT USE OF INSULIN (HCC): Primary | ICD-10-CM

## 2022-02-09 LAB
GLUCOSE BLOOD: 135
TEST STRIP LOT #: NORMAL NUMERIC

## 2022-02-09 PROCEDURE — 82947 ASSAY GLUCOSE BLOOD QUANT: CPT

## 2022-02-09 PROCEDURE — 3074F SYST BP LT 130 MM HG: CPT

## 2022-02-09 PROCEDURE — 3078F DIAST BP <80 MM HG: CPT

## 2022-02-09 PROCEDURE — 36416 COLLJ CAPILLARY BLOOD SPEC: CPT

## 2022-02-09 PROCEDURE — 99205 OFFICE O/P NEW HI 60 MIN: CPT

## 2022-02-09 RX ORDER — SEMAGLUTIDE 1.34 MG/ML
0.25 INJECTION, SOLUTION SUBCUTANEOUS WEEKLY
Qty: 1.5 ML | Refills: 2 | Status: SHIPPED | OUTPATIENT
Start: 2022-02-09

## 2022-02-09 RX ORDER — GLYBURIDE 5 MG/1
10 TABLET ORAL
COMMUNITY
End: 2022-02-11

## 2022-02-09 NOTE — PATIENT INSTRUCTIONS
Medication:  Continue Metformin 1000mg once daily in the evening  Change Glyburide to Glipizide 10mg once daily in the morning   Start Ozempic- 0.25mg once weekly for 4 weeks and then increase to 0.5mg weekly       Glucose targets:  Fasting - 80-130mg/dl  Evening- <180mg/dl    Continue to check sugars once daily fasting and sometimes in the evening       Ozempic Is considered a Non insulin injectables (also called a \"gut hormone\" or GLP-1 receptor agonists). The  medicine  works in the following ways:   1. By helping beta cells release more insulin when there is food in the stomach and intestines. The increased insulin lowers blood sugars. 2.            By stopping the liver from releasing sugar in to the blood when it is NOT needed. 3.            By slowing the movement of food through the stomach so that glucose (or sugar) enters the blood more slowly   4. By making you feel full which helps decrease the amount of food that you eat.      Common side effects:   Nausea or diarrhea   These effects usually go away over time as your body gets used to the medicine     Here are some things that might help your nausea go away:     Eat small amounts of food instead of  large meals  Eat plain, bland non greasy foods   Drink plenty of fluids (sugar free)   Avoid foods and smells that might make you sick   Eat slowly and listen to your hunger

## 2022-02-10 ENCOUNTER — TELEPHONE (OUTPATIENT)
Dept: ENDOCRINOLOGY CLINIC | Facility: CLINIC | Age: 62
End: 2022-02-10

## 2022-02-10 ENCOUNTER — PATIENT MESSAGE (OUTPATIENT)
Dept: ENDOCRINOLOGY CLINIC | Facility: CLINIC | Age: 62
End: 2022-02-10

## 2022-02-11 RX ORDER — GLIPIZIDE 10 MG/1
10 TABLET ORAL DAILY
Qty: 90 TABLET | Refills: 0 | Status: SHIPPED | OUTPATIENT
Start: 2022-02-11

## 2022-02-11 NOTE — TELEPHONE ENCOUNTER
From: Veronica Quintero  To: BENTON Lyons  Sent: 2/10/2022 5:39 PM CST  Subject: Medication     Good afternoon. . I wanted to know if you do sent the prescription in for the glipizide. I don't see where it had been ordered in place of the gliberide. Please confirm.

## 2022-02-14 NOTE — TELEPHONE ENCOUNTER
This drug has been approved. Approved quantity: 1.5 units per 28 day(s). The drug has been approved from 01/28/2022 to 02/11/2023. Patient made aware.

## 2022-03-02 ENCOUNTER — TELEPHONE (OUTPATIENT)
Dept: INTERNAL MEDICINE CLINIC | Facility: CLINIC | Age: 62
End: 2022-03-02

## 2022-03-02 RX ORDER — BLOOD-GLUCOSE METER
1 EACH MISCELLANEOUS DAILY
Qty: 1 EACH | Refills: 0 | Status: SHIPPED | OUTPATIENT
Start: 2022-03-02

## 2022-03-02 RX ORDER — LANCETS 30 GAUGE
EACH MISCELLANEOUS
Qty: 100 EACH | Refills: 0 | Status: SHIPPED | OUTPATIENT
Start: 2022-03-02

## 2022-03-02 RX ORDER — BLOOD SUGAR DIAGNOSTIC
STRIP MISCELLANEOUS
Qty: 100 STRIP | Refills: 5 | Status: SHIPPED | OUTPATIENT
Start: 2022-03-02 | End: 2022-03-03

## 2022-03-02 RX ORDER — CALCIUM CITRATE/VITAMIN D3 200MG-6.25
1 TABLET ORAL 2 TIMES DAILY
Qty: 100 STRIP | Refills: 0 | Status: SHIPPED | OUTPATIENT
Start: 2022-03-02

## 2022-03-02 NOTE — TELEPHONE ENCOUNTER
Refill passed per [facility] protocol   Requested Prescriptions   Pending Prescriptions Disp Refills    [facility] [address] [facility] Name: Borar[place]alaureen Rowe Test Strips Brittny Life] 100 strip 0     Sig: USE 1 STRIP 2 TIMES DAILY        Diabetic Supplies Protocol Passed - 3/1/2022  1:53 PM        Passed - Appointment in past 12 or next 3 months

## 2022-03-10 ENCOUNTER — OFFICE VISIT (OUTPATIENT)
Dept: OPHTHALMOLOGY | Facility: CLINIC | Age: 62
End: 2022-03-10
Payer: COMMERCIAL

## 2022-03-10 DIAGNOSIS — E11.9 DIABETES MELLITUS TYPE 2 WITHOUT RETINOPATHY (HCC): Primary | ICD-10-CM

## 2022-03-10 DIAGNOSIS — H25.13 AGE-RELATED NUCLEAR CATARACT OF BOTH EYES: ICD-10-CM

## 2022-03-10 PROCEDURE — 2023F DILAT RTA XM W/O RTNOPTHY: CPT | Performed by: OPHTHALMOLOGY

## 2022-03-10 PROCEDURE — 92015 DETERMINE REFRACTIVE STATE: CPT | Performed by: OPHTHALMOLOGY

## 2022-03-10 PROCEDURE — 92004 COMPRE OPH EXAM NEW PT 1/>: CPT | Performed by: OPHTHALMOLOGY

## 2022-03-10 NOTE — ASSESSMENT & PLAN NOTE
Diabetes type II: no background of retinopathy, no signs of neovascularization noted. Discussed ocular and systemic benefits of blood sugar control. Diagnosis and treatment discussed in detail with patient. Discussed with patient that hemoglobin A1C of 8.3 is too high. Continue to follow up with endocrinologist for improved blood sugar control.

## 2022-03-10 NOTE — PATIENT INSTRUCTIONS
Diabetes mellitus type 2 without retinopathy ([facility] [address]  Diabetes type II: no background of retinopathy, no signs of neovascularization noted. Discussed ocular and systemic benefits of blood sugar control. Diagnosis and treatment discussed in detail with patient. Discussed with patient that hemoglobin A1C of 8.3 is too high. Continue to follow up with endocrinologist for improved blood sugar control. Age-related nuclear cataract of both eyes  Discussed mild cataracts in both eyes that are not affecting vision and are not surgical at this time. New glasses today; update as needed. Discussed that new prescription is only a slight change.

## 2022-04-18 ENCOUNTER — PATIENT MESSAGE (OUTPATIENT)
Dept: ENDOCRINOLOGY CLINIC | Facility: CLINIC | Age: 62
End: 2022-04-18

## 2022-04-18 RX ORDER — SEMAGLUTIDE 1.34 MG/ML
0.5 INJECTION, SOLUTION SUBCUTANEOUS WEEKLY
Qty: 4.5 ML | Refills: 0 | Status: SHIPPED | OUTPATIENT
Start: 2022-04-18

## 2022-04-18 NOTE — TELEPHONE ENCOUNTER
LOV: 2/9/22    Future Appointments   Date Time Provider Jason Orta   5/11/2022 11:00 AM BENTON Hernandez [facility]

## 2022-04-19 ENCOUNTER — TELEPHONE (OUTPATIENT)
Dept: ENDOCRINOLOGY CLINIC | Facility: CLINIC | Age: 62
End: 2022-04-19

## 2022-04-19 NOTE — TELEPHONE ENCOUNTER
Received phone call from pharmacy that ozempic needs PA. Please see TE 4/19/22 for PA. Pt was made aware.

## 2022-04-19 NOTE — TELEPHONE ENCOUNTER
Medication PA Requested: Ozempic 2mg/1.5 ml                                                         CoverMyMeds Used:  Key:  Quantity: 4.5 ml  Day Supply: 84 days  Sig: Inject 0.5 mg weekly  DX Code: E11.9                                     CPT code (if applicable):   Case Number/Pending Ref#:

## 2022-04-20 ENCOUNTER — PATIENT MESSAGE (OUTPATIENT)
Dept: ENDOCRINOLOGY CLINIC | Facility: CLINIC | Age: 62
End: 2022-04-20

## 2022-04-20 NOTE — TELEPHONE ENCOUNTER
Spoke to pharmacist - Varun Barry is due for refill on 4/21/22  [facility] sent to patient advising Mary Beth Copier will be filled tomorrow (4/21/22)

## 2022-04-20 NOTE — TELEPHONE ENCOUNTER
Pharmacy comments: per patient, she's using 0.5mg weekly. If correct please send new Rx with dose increase. She is out and needs for today.  Ty.     Fuentes: [id number]

## 2022-04-21 LAB
AMB EXT CHOLESTEROL, TOTAL: 178 MG/DL
AMB EXT HDL CHOLESTEROL: 44 MG/DL
AMB EXT LDL CHOLESTEROL, DIRECT: 110 MG/DL
AMB EXT TRIGLYCERIDES: 134 MG/DL
AMB EXT VLDL: 24 MG/DL

## 2022-04-21 RX ORDER — ATORVASTATIN CALCIUM 40 MG/1
40 TABLET, FILM COATED ORAL DAILY
Qty: 90 TABLET | Refills: 1 | Status: SHIPPED | OUTPATIENT
Start: 2022-04-21

## 2022-04-21 NOTE — TELEPHONE ENCOUNTER
Please review; protocol failed/no protocol.      Requested Prescriptions   Pending Prescriptions Disp Refills    ATORVASTATIN 40 MG Oral Tab [Pharmacy Med Name: Atorvastatin Calcium 40 Mg Tab Nort] 90 tablet 0     Sig: TAKE ONE TABLET BY MOUTH ONE TIME DAILY        Cholesterol Medication Protocol Failed - 4/21/2022 11:08 AM        Failed - Last LDL < 130     No results found for: LDL            Passed - ALT in past 12 months        Passed - LDL in past 12 months        Passed - Last ALT < 80       Lab Results   Component Value Date    ALT 16 10/05/2021             Passed - Appointment in past 12 or next 3 months               Recent Outpatient Visits              1 month ago Diabetes mellitus type 2 without retinopathy [facility])    [facility]hthal[facility] Sarah Isaac MD    Office Visit    2 months ago Type 2 diabetes mellitus without complication, without long-term current use of insulin [facility])    [facility] Endocrinology BENTON Henao    Office Visit    2 months ago Type 2 diabetes mellitus without complication, without long-term current use of insulin [facility])    [facility], [address], Suri Henry MD    Office Visit    5 months ago Adult general medical exam    [address] [facility], Suri Henry MD    Office Visit    9 months ago Type 2 diabetes mellitus without complication, without long-term current use of insulin [facility])    [facility], [address], Suri Henry MD    Office Visit             Future Appointments         Provider Department Appt Notes    In 6 days Fozia Becker MD [facility], [address] 3 month  f/u    In 2 weeks Jeancarlos Payne, [facility] f/u

## 2022-04-21 NOTE — TELEPHONE ENCOUNTER
From: Pillo Ohara  Sent: 4/20/2022 5:40 PM CDT  To: Shivani Boland Clinical Staff  Subject: Update on Ozempic    Sanket Chew. I have one refill left on the existing prescription. My understanding was that it had to be refilled on 4/22/22. I did not have a dose for this past Monday so ill get the last refill before this coming Monday and resume taking it at that time. I will need a new prescription after this last refill.  Thank you

## 2022-04-22 ENCOUNTER — TELEPHONE (OUTPATIENT)
Dept: INTERNAL MEDICINE CLINIC | Facility: CLINIC | Age: 62
End: 2022-04-22

## 2022-04-22 NOTE — TELEPHONE ENCOUNTER
Patient stated if she needed to get her A1C Lab done since [facility] stated there was only an order for her Lipid panel.     If so please generate order for patient to get it at [facility].

## 2022-04-22 NOTE — TELEPHONE ENCOUNTER
Medication PA Requested: Ozempic 2mg/1.5 ml                                                         CoverMyMeds Used:  Key:  Quantity: 4.5 ml  Day Supply: 84 days  Sig: Inject 0.5 mg weekly  DX Code: [address], [phone] spoke with Alli Mena. She verified that patient picked up med and no PA needed at this time. It went through insurance.

## 2022-04-22 NOTE — TELEPHONE ENCOUNTER
Patient notified Lipid panel came back ok. Also A1C Order faxed to [facility]im[facility] Welsh in [facility] [phone].

## 2022-04-25 LAB — AMB EXT HGBA1C: 7.5 %

## 2022-04-25 PROCEDURE — 3051F HG A1C>EQUAL 7.0%<8.0%: CPT

## 2022-04-27 ENCOUNTER — TELEPHONE (OUTPATIENT)
Dept: INTERNAL MEDICINE CLINIC | Facility: CLINIC | Age: 62
End: 2022-04-27

## 2022-04-27 ENCOUNTER — OFFICE VISIT (OUTPATIENT)
Dept: INTERNAL MEDICINE CLINIC | Facility: CLINIC | Age: 62
End: 2022-04-27
Payer: COMMERCIAL

## 2022-04-27 VITALS
BODY MASS INDEX: 36.8 KG/M2 | DIASTOLIC BLOOD PRESSURE: 80 MMHG | SYSTOLIC BLOOD PRESSURE: 138 MMHG | HEART RATE: 90 BPM | RESPIRATION RATE: 14 BRPM | OXYGEN SATURATION: 98 % | HEIGHT: 62 IN | WEIGHT: 200 LBS

## 2022-04-27 DIAGNOSIS — E11.9 DIABETES MELLITUS TYPE 2 WITHOUT RETINOPATHY (HCC): ICD-10-CM

## 2022-04-27 DIAGNOSIS — E78.2 MIXED HYPERLIPIDEMIA: Primary | ICD-10-CM

## 2022-04-27 DIAGNOSIS — J45.909 MILD ASTHMA WITHOUT COMPLICATION, UNSPECIFIED WHETHER PERSISTENT: ICD-10-CM

## 2022-04-27 PROCEDURE — 3008F BODY MASS INDEX DOCD: CPT | Performed by: INTERNAL MEDICINE

## 2022-04-27 PROCEDURE — 3079F DIAST BP 80-89 MM HG: CPT | Performed by: INTERNAL MEDICINE

## 2022-04-27 PROCEDURE — 3075F SYST BP GE 130 - 139MM HG: CPT | Performed by: INTERNAL MEDICINE

## 2022-04-27 PROCEDURE — 99214 OFFICE O/P EST MOD 30 MIN: CPT | Performed by: INTERNAL MEDICINE

## 2022-04-27 RX ORDER — ALBUTEROL SULFATE 90 UG/1
1 AEROSOL, METERED RESPIRATORY (INHALATION) EVERY 6 HOURS PRN
Qty: 1 EACH | Refills: 3 | Status: SHIPPED | OUTPATIENT
Start: 2022-04-27

## 2022-04-27 RX ORDER — ALBUTEROL SULFATE 90 UG/1
1 AEROSOL, METERED RESPIRATORY (INHALATION) EVERY 6 HOURS PRN
COMMUNITY
Start: 2022-04-20 | End: 2022-04-27

## 2022-04-27 RX ORDER — NAPROXEN 500 MG/1
TABLET ORAL
COMMUNITY
Start: 2022-01-26

## 2022-04-27 RX ORDER — FLUTICASONE PROPIONATE AND SALMETEROL 250; 50 UG/1; UG/1
1 POWDER RESPIRATORY (INHALATION) EVERY 12 HOURS SCHEDULED
Qty: 1 EACH | Refills: 3 | Status: SHIPPED | OUTPATIENT
Start: 2022-04-27

## 2022-04-27 NOTE — TELEPHONE ENCOUNTER
Spoke with patient ( verified) and relayed message below--patient verbalizes understanding and agreement. No further questions/concerns at this time.

## 2022-05-10 RX ORDER — GLIPIZIDE 10 MG/1
10 TABLET ORAL DAILY
Qty: 90 TABLET | Refills: 0 | Status: SHIPPED | OUTPATIENT
Start: 2022-05-10 | End: 2022-05-11 | Stop reason: DRUGHIGH

## 2022-05-11 ENCOUNTER — OFFICE VISIT (OUTPATIENT)
Dept: ENDOCRINOLOGY CLINIC | Facility: CLINIC | Age: 62
End: 2022-05-11
Payer: COMMERCIAL

## 2022-05-11 VITALS
WEIGHT: 200.25 LBS | DIASTOLIC BLOOD PRESSURE: 75 MMHG | BODY MASS INDEX: 37 KG/M2 | SYSTOLIC BLOOD PRESSURE: 116 MMHG | HEART RATE: 89 BPM

## 2022-05-11 DIAGNOSIS — E11.9 TYPE 2 DIABETES MELLITUS WITHOUT COMPLICATION, WITHOUT LONG-TERM CURRENT USE OF INSULIN (HCC): Primary | ICD-10-CM

## 2022-05-11 LAB
GLUCOSE BLOOD: 92
TEST STRIP LOT #: NORMAL NUMERIC

## 2022-05-11 PROCEDURE — 3078F DIAST BP <80 MM HG: CPT

## 2022-05-11 PROCEDURE — 99214 OFFICE O/P EST MOD 30 MIN: CPT

## 2022-05-11 PROCEDURE — 82947 ASSAY GLUCOSE BLOOD QUANT: CPT

## 2022-05-11 PROCEDURE — 3074F SYST BP LT 130 MM HG: CPT

## 2022-05-11 PROCEDURE — 36416 COLLJ CAPILLARY BLOOD SPEC: CPT

## 2022-05-11 RX ORDER — GLIPIZIDE 5 MG/1
5 TABLET ORAL
Qty: 30 TABLET | Refills: 5 | Status: SHIPPED | OUTPATIENT
Start: 2022-05-11

## 2022-05-11 RX ORDER — SEMAGLUTIDE 1.34 MG/ML
1 INJECTION, SOLUTION SUBCUTANEOUS WEEKLY
Qty: 3 ML | Refills: 5 | Status: SHIPPED | OUTPATIENT
Start: 2022-05-11

## 2022-05-11 NOTE — PATIENT INSTRUCTIONS
Decrease Glipizide to 5 mg daily     Increase Ozempic to 1mg subcutaneous weekly     Continue Metformin

## 2022-05-25 ENCOUNTER — APPOINTMENT (OUTPATIENT)
Dept: GENERAL RADIOLOGY | Age: 62
End: 2022-05-25
Payer: COMMERCIAL

## 2022-05-25 ENCOUNTER — HOSPITAL ENCOUNTER (OUTPATIENT)
Age: 62
Discharge: HOME | End: 2022-05-25
Payer: COMMERCIAL

## 2022-05-25 VITALS
OXYGEN SATURATION: 96 % | SYSTOLIC BLOOD PRESSURE: 135 MMHG | TEMPERATURE: 98 F | DIASTOLIC BLOOD PRESSURE: 70 MMHG | RESPIRATION RATE: 18 BRPM | HEART RATE: 83 BPM

## 2022-05-25 DIAGNOSIS — L03.011 CELLULITIS OF FINGER OF RIGHT HAND: Primary | ICD-10-CM

## 2022-05-25 LAB — GLUCOSE BLDC GLUCOMTR-MCNC: 78 MG/DL (ref 70–99)

## 2022-05-25 PROCEDURE — 99213 OFFICE O/P EST LOW 20 MIN: CPT

## 2022-05-25 PROCEDURE — 73140 X-RAY EXAM OF FINGER(S): CPT

## 2022-05-25 PROCEDURE — 82962 GLUCOSE BLOOD TEST: CPT

## 2022-05-25 RX ORDER — CEPHALEXIN 500 MG/1
500 CAPSULE ORAL 4 TIMES DAILY
Qty: 40 CAPSULE | Refills: 0 | Status: SHIPPED | OUTPATIENT
Start: 2022-05-25 | End: 2022-06-04

## 2022-05-25 NOTE — ED INITIAL ASSESSMENT (HPI)
Patient with right finger swelling, tenderness and warmth for the last 2 days. Denies trauma/injury. Denies drainage from around the nailbed.

## 2022-06-27 RX ORDER — MONTELUKAST SODIUM 10 MG/1
TABLET ORAL
Qty: 90 TABLET | Refills: 0 | Status: SHIPPED | OUTPATIENT
Start: 2022-06-27

## 2022-07-06 ENCOUNTER — OFFICE VISIT (OUTPATIENT)
Dept: INTERNAL MEDICINE CLINIC | Facility: CLINIC | Age: 62
End: 2022-07-06
Payer: COMMERCIAL

## 2022-07-06 ENCOUNTER — HOSPITAL ENCOUNTER (OUTPATIENT)
Dept: GENERAL RADIOLOGY | Age: 62
Discharge: HOME | End: 2022-07-06
Attending: INTERNAL MEDICINE
Payer: COMMERCIAL

## 2022-07-06 VITALS
DIASTOLIC BLOOD PRESSURE: 88 MMHG | WEIGHT: 196 LBS | HEIGHT: 62 IN | SYSTOLIC BLOOD PRESSURE: 136 MMHG | BODY MASS INDEX: 36.07 KG/M2 | HEART RATE: 88 BPM | OXYGEN SATURATION: 98 % | RESPIRATION RATE: 14 BRPM

## 2022-07-06 DIAGNOSIS — M25.561 CHRONIC PAIN OF RIGHT KNEE: ICD-10-CM

## 2022-07-06 DIAGNOSIS — G89.29 CHRONIC PAIN OF RIGHT KNEE: ICD-10-CM

## 2022-07-06 DIAGNOSIS — M25.561 CHRONIC PAIN OF RIGHT KNEE: Primary | ICD-10-CM

## 2022-07-06 DIAGNOSIS — G89.29 CHRONIC PAIN OF RIGHT KNEE: Primary | ICD-10-CM

## 2022-07-06 PROCEDURE — 3008F BODY MASS INDEX DOCD: CPT | Performed by: INTERNAL MEDICINE

## 2022-07-06 PROCEDURE — 3079F DIAST BP 80-89 MM HG: CPT | Performed by: INTERNAL MEDICINE

## 2022-07-06 PROCEDURE — 99213 OFFICE O/P EST LOW 20 MIN: CPT | Performed by: INTERNAL MEDICINE

## 2022-07-06 PROCEDURE — 73564 X-RAY EXAM KNEE 4 OR MORE: CPT | Performed by: INTERNAL MEDICINE

## 2022-07-06 PROCEDURE — 3075F SYST BP GE 130 - 139MM HG: CPT | Performed by: INTERNAL MEDICINE

## 2022-07-06 RX ORDER — TRAMADOL HYDROCHLORIDE 50 MG/1
50 TABLET ORAL
Qty: 30 TABLET | Refills: 0 | Status: SHIPPED | OUTPATIENT
Start: 2022-07-06 | End: 2022-08-05

## 2022-07-06 RX ORDER — VITAMIN E 268 MG
400 CAPSULE ORAL DAILY
COMMUNITY

## 2022-07-06 RX ORDER — CETIRIZINE HYDROCHLORIDE 10 MG/1
10 TABLET ORAL DAILY
COMMUNITY

## 2022-07-18 ENCOUNTER — OFFICE VISIT (OUTPATIENT)
Dept: ORTHOPEDICS CLINIC | Facility: CLINIC | Age: 62
End: 2022-07-18
Payer: COMMERCIAL

## 2022-07-18 VITALS — WEIGHT: 196 LBS | BODY MASS INDEX: 36.07 KG/M2 | HEIGHT: 62 IN

## 2022-07-18 DIAGNOSIS — M17.11 PRIMARY OSTEOARTHRITIS OF RIGHT KNEE: Primary | ICD-10-CM

## 2022-07-18 DIAGNOSIS — M65.9 SYNOVITIS OF KNEE: ICD-10-CM

## 2022-07-18 RX ORDER — TRIAMCINOLONE ACETONIDE 40 MG/ML
40 INJECTION, SUSPENSION INTRA-ARTICULAR; INTRAMUSCULAR ONCE
Status: COMPLETED | OUTPATIENT
Start: 2022-07-18 | End: 2022-07-18

## 2022-07-18 RX ADMIN — TRIAMCINOLONE ACETONIDE 40 MG: 40 INJECTION, SUSPENSION INTRA-ARTICULAR; INTRAMUSCULAR at 10:50:00

## 2022-08-30 ENCOUNTER — OFFICE VISIT (OUTPATIENT)
Facility: CLINIC | Age: 62
End: 2022-08-30
Payer: COMMERCIAL

## 2022-08-30 VITALS
DIASTOLIC BLOOD PRESSURE: 80 MMHG | SYSTOLIC BLOOD PRESSURE: 128 MMHG | OXYGEN SATURATION: 99 % | BODY MASS INDEX: 34.6 KG/M2 | HEART RATE: 98 BPM | HEIGHT: 62 IN | RESPIRATION RATE: 14 BRPM | WEIGHT: 188 LBS

## 2022-08-30 DIAGNOSIS — Z12.11 SCREEN FOR COLON CANCER: ICD-10-CM

## 2022-08-30 DIAGNOSIS — E78.2 MIXED HYPERLIPIDEMIA: ICD-10-CM

## 2022-08-30 DIAGNOSIS — E11.9 DIABETES MELLITUS TYPE 2 WITHOUT RETINOPATHY (HCC): Primary | ICD-10-CM

## 2022-08-30 DIAGNOSIS — J45.909 MILD ASTHMA WITHOUT COMPLICATION, UNSPECIFIED WHETHER PERSISTENT: ICD-10-CM

## 2022-08-30 DIAGNOSIS — M17.11 OSTEOARTHRITIS OF RIGHT KNEE, UNSPECIFIED OSTEOARTHRITIS TYPE: ICD-10-CM

## 2022-08-30 DIAGNOSIS — Z23 NEED FOR PNEUMOCOCCAL VACCINATION: ICD-10-CM

## 2022-08-30 PROCEDURE — 90471 IMMUNIZATION ADMIN: CPT | Performed by: INTERNAL MEDICINE

## 2022-08-30 PROCEDURE — 3008F BODY MASS INDEX DOCD: CPT | Performed by: INTERNAL MEDICINE

## 2022-08-30 PROCEDURE — 3074F SYST BP LT 130 MM HG: CPT | Performed by: INTERNAL MEDICINE

## 2022-08-30 PROCEDURE — 3079F DIAST BP 80-89 MM HG: CPT | Performed by: INTERNAL MEDICINE

## 2022-08-30 PROCEDURE — 90677 PCV20 VACCINE IM: CPT | Performed by: INTERNAL MEDICINE

## 2022-08-30 PROCEDURE — 99214 OFFICE O/P EST MOD 30 MIN: CPT | Performed by: INTERNAL MEDICINE

## 2022-08-30 RX ORDER — ACETAMINOPHEN AND CODEINE PHOSPHATE 300; 30 MG/1; MG/1
1 TABLET ORAL
Qty: 30 TABLET | Refills: 0 | Status: SHIPPED | OUTPATIENT
Start: 2022-08-30 | End: 2022-09-29

## 2022-09-09 ENCOUNTER — TELEPHONE (OUTPATIENT)
Dept: INTERNAL MEDICINE CLINIC | Facility: CLINIC | Age: 62
End: 2022-09-09

## 2022-09-09 NOTE — TELEPHONE ENCOUNTER
Prior authorization form has been filled out for acetaminophen-codeine and faxed to [facility] to [phone] along with 8/30/22 visit.  It can take 1-5 business days for a decision to come back

## 2022-09-28 ENCOUNTER — PATIENT MESSAGE (OUTPATIENT)
Dept: ENDOCRINOLOGY CLINIC | Facility: CLINIC | Age: 62
End: 2022-09-28

## 2022-09-28 ENCOUNTER — OFFICE VISIT (OUTPATIENT)
Dept: ENDOCRINOLOGY CLINIC | Facility: CLINIC | Age: 62
End: 2022-09-28

## 2022-09-28 VITALS
SYSTOLIC BLOOD PRESSURE: 125 MMHG | WEIGHT: 185 LBS | BODY MASS INDEX: 34 KG/M2 | HEART RATE: 96 BPM | DIASTOLIC BLOOD PRESSURE: 82 MMHG

## 2022-09-28 DIAGNOSIS — E11.65 UNCONTROLLED TYPE 2 DIABETES MELLITUS WITH HYPERGLYCEMIA (HCC): Primary | ICD-10-CM

## 2022-09-28 LAB
CARTRIDGE LOT#: ABNORMAL NUMERIC
HEMOGLOBIN A1C: 6.7 % (ref 4.3–5.6)

## 2022-09-28 PROCEDURE — 3079F DIAST BP 80-89 MM HG: CPT

## 2022-09-28 PROCEDURE — 3074F SYST BP LT 130 MM HG: CPT

## 2022-09-28 PROCEDURE — 36416 COLLJ CAPILLARY BLOOD SPEC: CPT

## 2022-09-28 PROCEDURE — 83036 HEMOGLOBIN GLYCOSYLATED A1C: CPT

## 2022-09-28 PROCEDURE — 3044F HG A1C LEVEL LT 7.0%: CPT

## 2022-09-28 PROCEDURE — 99215 OFFICE O/P EST HI 40 MIN: CPT

## 2022-09-28 RX ORDER — TIRZEPATIDE 10 MG/.5ML
10 INJECTION, SOLUTION SUBCUTANEOUS WEEKLY
Qty: 2 ML | Refills: 1 | Status: SHIPPED | OUTPATIENT
Start: 2022-09-28

## 2022-09-28 RX ORDER — NAPROXEN 500 MG/1
500 TABLET ORAL 2 TIMES DAILY WITH MEALS
Qty: 60 TABLET | Refills: 0 | Status: SHIPPED | OUTPATIENT
Start: 2022-09-28 | End: 2022-12-21

## 2022-09-28 NOTE — TELEPHONE ENCOUNTER
Refill passed per [facility] protocol. Requested Prescriptions   Pending Prescriptions Disp Refills    naproxen 500 MG Oral Tab 60 tablet 0     Sig: Take 1 tablet (500 mg total) by mouth 2 (two) times daily with meals. Non-Narcotic Pain Medication Protocol Passed - 9/27/2022 10:28 AM        Passed - In person appointment or virtual visit in the past 6 mos or appointment in next 3 mos       Recent Outpatient Visits              4 weeks ago Diabetes mellitus type 2 without retinopathy [facility])    Chelly Khan MD    Office Visit    2 months ago Primary osteoarthritis of right knee    [address] Avrafael rGijalva PA-C    Office Visit    2 months ago Chronic pain of right knee    [facility], [address]    Office Visit    4 months ago Type 2 diabetes mellitus without complication, without long-term current use of insulin [facility])    [facility] Endocrinology BENTON Holm    Office Visit    5 months ago Mixed hyperlipidemia    [address] [facility] [address], Chu Covington MD    Office Visit     Future Appointments         Provider Department Appt Notes    Today Juan [facility] 4 month fu     In 4 weeks Johana Hodge PA-C [facility] - Orthopedics Right Knee injection.                       Recent Outpatient Visits              4 weeks ago Diabetes mellitus type 2 without retinopathy [facility])    Chu Nieves MD    Office Visit    2 months ago Primary osteoarthritis of right knee    [address] Claudia Grijalva PA-C    Office Visit    2 months ago Chronic pain of right knee    Chu Flowers MD    Office Visit    4 months ago Type 2 diabetes mellitus without complication, without long-term current use of insulin [facility])    [address] [facility] Endocrinology BENTON Holm    Office Visit    5 months ago Mixed hyperlipidemia    Roseline Adams MD    Office Visit             Future Appointments         Provider Department Appt Notes    Today Lonny Cortez, [address] [facility] Endocrinology 4 month fu     In 4 weeks Johana Hodge PA-C [facility] - Orthopedics Right Knee injection.

## 2022-10-04 ENCOUNTER — TELEPHONE (OUTPATIENT)
Dept: ENDOCRINOLOGY CLINIC | Facility: CLINIC | Age: 62
End: 2022-10-04

## 2022-10-04 ENCOUNTER — PATIENT MESSAGE (OUTPATIENT)
Dept: ORTHOPEDICS CLINIC | Facility: CLINIC | Age: 62
End: 2022-10-04

## 2022-10-04 RX ORDER — MONTELUKAST SODIUM 10 MG/1
10 TABLET ORAL DAILY
Qty: 90 TABLET | Refills: 1 | Status: SHIPPED | OUTPATIENT
Start: 2022-10-04

## 2022-10-04 NOTE — TELEPHONE ENCOUNTER
Refill passed per [facility] [facility] protocol    Requested Prescriptions   Pending Prescriptions Disp Refills    MONTELUKAST 10 MG Oral Tab [Pharmacy Med Name: Montelukast Sodium 10 Mg Tab Auro] 90 tablet 0     Sig: TAKE ONE TABLET BY MOUTH ONE TIME DAILY        Asthma & COPD Medication Protocol Passed - 10/3/2022  1:54 PM        Passed - In person appointment or virtual visit in the past 6 mos or appointment in next 3 mos       Recent Outpatient Visits              6 days ago Uncontrolled type 2 diabetes mellitus with hyperglycemia [facility])    [facility] Endocrinology BENTON Tamayo    Office Visit    1 month ago Diabetes mellitus type 2 without retinopathy [facility])    Melissa Finney, Anai Meraz MD    Office Visit    2 months ago Primary osteoarthritis of right knee    [address] Hugo Dowell PA-C    Office Visit    3 months ago Chronic pain of right knee    [facility], [facility]    Office Visit    4 months ago Type 2 diabetes mellitus without complication, without long-term current use of insulin [facility])    [address] Endocrinology BENTON Tamayo    Office Visit     Future Appointments         Provider Department Appt Notes    In 3 weeks Hugo Dowell PA-C [address] Right Knee injection. In 2 months Shama Mead, [facility] Endocrinology 3 month fu                      Future Appointments         Provider Department Appt Notes    In 3 weeks Hugo Dowell PA-C [facility] Medical [facility] - Orthopedics Right Knee injection.     In 2 months Shama Mead, [address] [facility] Endocrinology 3 month fu             Recent Outpatient Visits              6 days ago Uncontrolled type 2 diabetes mellitus with hyperglycemia [facility])    [facility] End[facility] Ashwin Trejo, BENTON    Office Visit 1 month ago Diabetes mellitus type 2 without retinopathy [facility])    Goran Liz MD    Office Visit    2 months ago Primary osteoarthritis of right knee    [address] Elizabeth Zhang PA-C    Office Visit    3 months ago Chronic pain of right knee    [address], [address] [facility] Goran Vazquez MD    Office Visit    4 months ago Type 2 diabetes mellitus without complication, without long-term current use of insulin [facility])    [address] Endocrinology Priscilla Brennan, [place]ENT[place]    Office Visit

## 2022-10-05 ENCOUNTER — TELEPHONE (OUTPATIENT)
Dept: ENDOCRINOLOGY CLINIC | Facility: CLINIC | Age: 62
End: 2022-10-05

## 2022-10-05 RX ORDER — TRAMADOL HYDROCHLORIDE 50 MG/1
50 TABLET ORAL 2 TIMES DAILY PRN
Qty: 30 TABLET | Refills: 0 | Status: SHIPPED | OUTPATIENT
Start: 2022-10-05

## 2022-10-05 NOTE — TELEPHONE ENCOUNTER
From: Patricia Calloway  To: Eze Waite PA-C  Sent: 10/4/2022 4:44 PM CDT  Subject: Prescription refill. Good afternoon. I came in on the 18th of July. At that time I received a cortisone injection as well as having fluid drained from my knee. Pain was moderate and you prescribed tramadol. I have an upcoming visit on the 26th of October for another injection. However, I am experiencing pain in my knee at this time. Would it be possible for me to get a prescription for tramadol sent to my pharmacy as soon as possible?

## 2022-10-26 ENCOUNTER — OFFICE VISIT (OUTPATIENT)
Dept: ORTHOPEDICS CLINIC | Facility: CLINIC | Age: 62
End: 2022-10-26
Payer: COMMERCIAL

## 2022-10-26 VITALS — BODY MASS INDEX: 34.04 KG/M2 | HEIGHT: 62 IN | WEIGHT: 185 LBS

## 2022-10-26 DIAGNOSIS — M17.11 PRIMARY OSTEOARTHRITIS OF RIGHT KNEE: Primary | ICD-10-CM

## 2022-10-26 PROCEDURE — 20610 DRAIN/INJ JOINT/BURSA W/O US: CPT | Performed by: PHYSICIAN ASSISTANT

## 2022-10-26 PROCEDURE — 3008F BODY MASS INDEX DOCD: CPT | Performed by: PHYSICIAN ASSISTANT

## 2022-10-26 PROCEDURE — 99213 OFFICE O/P EST LOW 20 MIN: CPT | Performed by: PHYSICIAN ASSISTANT

## 2022-10-26 RX ORDER — TRIAMCINOLONE ACETONIDE 40 MG/ML
40 INJECTION, SUSPENSION INTRA-ARTICULAR; INTRAMUSCULAR ONCE
Status: COMPLETED | OUTPATIENT
Start: 2022-10-26 | End: 2022-10-26

## 2022-10-26 RX ADMIN — TRIAMCINOLONE ACETONIDE 40 MG: 40 INJECTION, SUSPENSION INTRA-ARTICULAR; INTRAMUSCULAR at 10:14:00

## 2022-10-26 NOTE — PROGRESS NOTES
[facility] Progress Note      Chief Complaint:  Right knee pain and swelling      Subjective: Jean-Pierre Gong is a 58year old female who is here for reevaluation of her right knee. She has a known diagnosis of degenerative arthritis and has been seen in the past.  Her most recent visit was 3 months ago at which time aspiration and cortisone injection was administered to the right knee. This provided relief for approximately 3 months and pain recently started to recur. She feels that it is swollen again. She does have difficulty with ambulation due to the pain. She did try to get into see me sooner but was told she had to wait 3 months in between injections. Objective: Exam of the right knee and lower extremity reveals that the overlying skin is intact. She has patellofemoral crepitus with range of motion. She does have a moderate effusion. She is tender about the medial and lateral joint line. Stable ligamentous exam.  Sensation is present to light touch. Assessment: Right knee degenerative joint disease and effusion      Plan: Unfortunately, the cortisone injection was only temporarily beneficial.  Due to her increased symptoms and exacerbation, it would be reasonable to go ahead with a repeat aspiration and cortisone injection to the right knee. I also discussed the option for viscosupplementation injection and a brochure for Jose Hong was given. She will call to set up authorization and ordering of the medication if she wishes to proceed with this in the next 2 to 3 months. I discussed that she can contact me directly if she has questions about repeat injection in the future. She would like to proceed and will follow-up with any other questions or concerns. Procedure: Risk, benefits, and alternatives to the aspiration and cortisone injection including but not limited to risk of needle infection, flareup, and failure to improve was discussed.   She would like to proceed and under meticulous sterile technique, 4 cc of Xylocaine was used anesthetize the lateral patellofemoral joint of the right knee. Then through the 18-gauge needle, 20 cc of clear serosanguineous fluid was aspirated. Through the same 18-gauge needle, 4 cc of Xylocaine and Marcaine along with 40 mg of Kenalog was injected with free flow of fluid. A Band-Aid was applied and she was advised to follow-up with any adverse reactions.         Bushra Jay PA-C  Orthopedic Surgery   [facility]  [facility], Estefany STEWARD, [address]   t: [phone]  f: [phone]

## 2022-10-27 ENCOUNTER — PATIENT MESSAGE (OUTPATIENT)
Dept: ENDOCRINOLOGY CLINIC | Facility: CLINIC | Age: 62
End: 2022-10-27

## 2022-10-27 ENCOUNTER — TELEPHONE (OUTPATIENT)
Dept: ENDOCRINOLOGY CLINIC | Facility: CLINIC | Age: 62
End: 2022-10-27

## 2022-10-27 RX ORDER — SEMAGLUTIDE 1.34 MG/ML
1 INJECTION, SOLUTION SUBCUTANEOUS WEEKLY
Qty: 3 ML | Refills: 4 | Status: SHIPPED | OUTPATIENT
Start: 2022-10-27

## 2022-10-27 NOTE — TELEPHONE ENCOUNTER
From: Dmitri Madrid  To: BENTON Tracy  Sent: 9/28/2022 11:52 PM CDT  Subject: Sam Dobbins prescription     Good evening. I checked with my pharmacy this afternoon in regards to the status of my mounjaro prescription. I was told that they are waiting for pre-authorization from you in order to process the prescription.

## 2022-11-07 RX ORDER — ATORVASTATIN CALCIUM 40 MG/1
TABLET, FILM COATED ORAL
Qty: 90 TABLET | Refills: 0 | Status: SHIPPED | OUTPATIENT
Start: 2022-11-07

## 2022-12-02 RX ORDER — GLIPIZIDE 5 MG/1
5 TABLET ORAL
Qty: 30 TABLET | Refills: 5 | Status: SHIPPED | OUTPATIENT
Start: 2022-12-02

## 2022-12-02 NOTE — TELEPHONE ENCOUNTER
LOV: 9/28/2022    RTC: 4 months     F/U: 12/21/2022    CATRACHITA Gaona-- orders pending, approve if appropriate

## 2022-12-07 RX ORDER — VALACYCLOVIR HYDROCHLORIDE 1 G/1
2000 TABLET, FILM COATED ORAL EVERY 12 HOURS SCHEDULED
Qty: 4 TABLET | Refills: 3 | Status: SHIPPED | OUTPATIENT
Start: 2022-12-07

## 2022-12-21 ENCOUNTER — OFFICE VISIT (OUTPATIENT)
Dept: INTERNAL MEDICINE CLINIC | Facility: CLINIC | Age: 62
End: 2022-12-21
Payer: COMMERCIAL

## 2022-12-21 ENCOUNTER — OFFICE VISIT (OUTPATIENT)
Dept: ENDOCRINOLOGY CLINIC | Facility: CLINIC | Age: 62
End: 2022-12-21
Payer: COMMERCIAL

## 2022-12-21 VITALS
SYSTOLIC BLOOD PRESSURE: 122 MMHG | OXYGEN SATURATION: 97 % | HEIGHT: 62 IN | WEIGHT: 176 LBS | RESPIRATION RATE: 14 BRPM | DIASTOLIC BLOOD PRESSURE: 70 MMHG | HEART RATE: 88 BPM | BODY MASS INDEX: 32.39 KG/M2

## 2022-12-21 VITALS
HEART RATE: 86 BPM | WEIGHT: 176 LBS | SYSTOLIC BLOOD PRESSURE: 109 MMHG | DIASTOLIC BLOOD PRESSURE: 70 MMHG | BODY MASS INDEX: 32 KG/M2

## 2022-12-21 DIAGNOSIS — L30.9 ECZEMA, UNSPECIFIED TYPE: ICD-10-CM

## 2022-12-21 DIAGNOSIS — E11.9 CONTROLLED TYPE 2 DIABETES MELLITUS WITHOUT COMPLICATION, WITHOUT LONG-TERM CURRENT USE OF INSULIN (HCC): Primary | ICD-10-CM

## 2022-12-21 DIAGNOSIS — Z23 INFLUENZA VACCINE NEEDED: ICD-10-CM

## 2022-12-21 DIAGNOSIS — G89.29 CHRONIC MIDLINE LOW BACK PAIN WITH RIGHT-SIDED SCIATICA: ICD-10-CM

## 2022-12-21 DIAGNOSIS — E78.2 MIXED HYPERLIPIDEMIA: ICD-10-CM

## 2022-12-21 DIAGNOSIS — J45.909 MILD ASTHMA WITHOUT COMPLICATION, UNSPECIFIED WHETHER PERSISTENT: ICD-10-CM

## 2022-12-21 DIAGNOSIS — E11.9 DIABETES MELLITUS TYPE 2 WITHOUT RETINOPATHY (HCC): Primary | ICD-10-CM

## 2022-12-21 DIAGNOSIS — M54.41 CHRONIC MIDLINE LOW BACK PAIN WITH RIGHT-SIDED SCIATICA: ICD-10-CM

## 2022-12-21 LAB
CARTRIDGE LOT#: ABNORMAL NUMERIC
GLUCOSE BLOOD: 108
HEMOGLOBIN A1C: 6.3 % (ref 4.3–5.6)
TEST STRIP LOT #: NORMAL NUMERIC

## 2022-12-21 PROCEDURE — 3074F SYST BP LT 130 MM HG: CPT

## 2022-12-21 PROCEDURE — 3044F HG A1C LEVEL LT 7.0%: CPT | Performed by: INTERNAL MEDICINE

## 2022-12-21 PROCEDURE — 3008F BODY MASS INDEX DOCD: CPT | Performed by: INTERNAL MEDICINE

## 2022-12-21 PROCEDURE — 83036 HEMOGLOBIN GLYCOSYLATED A1C: CPT

## 2022-12-21 PROCEDURE — 3044F HG A1C LEVEL LT 7.0%: CPT

## 2022-12-21 PROCEDURE — 90686 IIV4 VACC NO PRSV 0.5 ML IM: CPT | Performed by: INTERNAL MEDICINE

## 2022-12-21 PROCEDURE — 99214 OFFICE O/P EST MOD 30 MIN: CPT | Performed by: INTERNAL MEDICINE

## 2022-12-21 PROCEDURE — 82947 ASSAY GLUCOSE BLOOD QUANT: CPT

## 2022-12-21 PROCEDURE — 3078F DIAST BP <80 MM HG: CPT

## 2022-12-21 PROCEDURE — 90471 IMMUNIZATION ADMIN: CPT | Performed by: INTERNAL MEDICINE

## 2022-12-21 PROCEDURE — 3074F SYST BP LT 130 MM HG: CPT | Performed by: INTERNAL MEDICINE

## 2022-12-21 PROCEDURE — 99214 OFFICE O/P EST MOD 30 MIN: CPT

## 2022-12-21 PROCEDURE — 3078F DIAST BP <80 MM HG: CPT | Performed by: INTERNAL MEDICINE

## 2022-12-21 RX ORDER — TRAMADOL HYDROCHLORIDE 50 MG/1
50 TABLET ORAL 2 TIMES DAILY PRN
Qty: 30 TABLET | Refills: 0 | Status: SHIPPED | OUTPATIENT
Start: 2022-12-21

## 2022-12-21 RX ORDER — SEMAGLUTIDE 2.68 MG/ML
2 INJECTION, SOLUTION SUBCUTANEOUS WEEKLY
Qty: 3 ML | Refills: 3 | Status: SHIPPED | OUTPATIENT
Start: 2022-12-21

## 2022-12-21 RX ORDER — TRIAMCINOLONE ACETONIDE 1 MG/G
CREAM TOPICAL 2 TIMES DAILY PRN
Qty: 60 G | Refills: 0 | Status: SHIPPED | OUTPATIENT
Start: 2022-12-21

## 2022-12-21 RX ORDER — SEMAGLUTIDE 2.68 MG/ML
2 INJECTION, SOLUTION SUBCUTANEOUS WEEKLY
Qty: 1 EACH | Refills: 0 | COMMUNITY
Start: 2022-12-21 | End: 2022-12-21

## 2022-12-21 RX ORDER — NAPROXEN 500 MG/1
500 TABLET ORAL 2 TIMES DAILY WITH MEALS
Qty: 60 TABLET | Refills: 0 | Status: SHIPPED | OUTPATIENT
Start: 2022-12-21

## 2022-12-21 NOTE — PROGRESS NOTES
Sample of Ozempic 2 mg provided to patient in clinic today. Reviewed administration, storage, potential medication interactions, allergies, and side effects of the medication with patient. Reviewed dosage and subcutaneous injection technique. Written instructions of the above including medication dose, route, frequency, storage, administration, and potential side effects provided to patient.

## 2022-12-23 ENCOUNTER — TELEPHONE (OUTPATIENT)
Dept: INTERNAL MEDICINE CLINIC | Facility: CLINIC | Age: 62
End: 2022-12-23

## 2023-01-11 ENCOUNTER — APPOINTMENT (OUTPATIENT)
Dept: GENERAL RADIOLOGY | Age: 63
End: 2023-01-11
Attending: EMERGENCY MEDICINE
Payer: MEDICAID

## 2023-01-11 ENCOUNTER — HOSPITAL ENCOUNTER (OUTPATIENT)
Age: 63
Discharge: HOME | End: 2023-01-11
Attending: EMERGENCY MEDICINE
Payer: MEDICAID

## 2023-01-11 VITALS
DIASTOLIC BLOOD PRESSURE: 76 MMHG | HEART RATE: 84 BPM | RESPIRATION RATE: 18 BRPM | SYSTOLIC BLOOD PRESSURE: 130 MMHG | TEMPERATURE: 97 F | OXYGEN SATURATION: 98 %

## 2023-01-11 DIAGNOSIS — R07.89 CHEST WALL PAIN: Primary | ICD-10-CM

## 2023-01-11 LAB
#MXD IC: 0.5 X10ˆ3/UL (ref 0.1–1)
ATRIAL RATE: 81 BPM
BUN BLD-MCNC: 11 MG/DL (ref 7–18)
CHLORIDE BLD-SCNC: 103 MMOL/L (ref 98–112)
CO2 BLD-SCNC: 26 MMOL/L (ref 21–32)
CREAT BLD-MCNC: 0.6 MG/DL
GFR SERPLBLD BASED ON 1.73 SQ M-ARVRAT: 101 ML/MIN/1.73M2 (ref 60–?)
GLUCOSE BLD-MCNC: 99 MG/DL (ref 70–99)
HCT VFR BLD AUTO: 42.1 %
HCT VFR BLD CALC: 44 %
HGB BLD-MCNC: 13.4 G/DL
ISTAT IONIZED CALCIUM FOR CHEM 8: 1.14 MMOL/L (ref 1.12–1.32)
LYMPHOCYTES # BLD AUTO: 1.4 X10ˆ3/UL (ref 1–4)
LYMPHOCYTES NFR BLD AUTO: 26.6 %
MCH RBC QN AUTO: 29.2 PG (ref 26–34)
MCHC RBC AUTO-ENTMCNC: 31.8 G/DL (ref 31–37)
MCV RBC AUTO: 91.7 FL (ref 80–100)
MIXED CELL %: 9.8 %
NEUTROPHILS # BLD AUTO: 3.5 X10ˆ3/UL (ref 1.5–7.7)
NEUTROPHILS NFR BLD AUTO: 63.6 %
P AXIS: 70 DEGREES
P-R INTERVAL: 156 MS
PLATELET # BLD AUTO: 246 X10ˆ3/UL (ref 150–450)
POTASSIUM BLD-SCNC: 4.3 MMOL/L (ref 3.6–5.1)
Q-T INTERVAL: 358 MS
QRS DURATION: 62 MS
QTC CALCULATION (BEZET): 415 MS
R AXIS: 25 DEGREES
RBC # BLD AUTO: 4.59 X10ˆ6/UL
SODIUM BLD-SCNC: 140 MMOL/L (ref 136–145)
T AXIS: 60 DEGREES
TROPONIN I BLD-MCNC: <0.02 NG/ML
VENTRICULAR RATE: 81 BPM
WBC # BLD AUTO: 5.4 X10ˆ3/UL (ref 4–11)

## 2023-01-11 PROCEDURE — 93010 ELECTROCARDIOGRAM REPORT: CPT

## 2023-01-11 PROCEDURE — 80047 BASIC METABLC PNL IONIZED CA: CPT

## 2023-01-11 PROCEDURE — 93005 ELECTROCARDIOGRAM TRACING: CPT

## 2023-01-11 PROCEDURE — 36415 COLL VENOUS BLD VENIPUNCTURE: CPT

## 2023-01-11 PROCEDURE — 99215 OFFICE O/P EST HI 40 MIN: CPT

## 2023-01-11 PROCEDURE — 84484 ASSAY OF TROPONIN QUANT: CPT

## 2023-01-11 PROCEDURE — 71101 X-RAY EXAM UNILAT RIBS/CHEST: CPT | Performed by: EMERGENCY MEDICINE

## 2023-01-11 PROCEDURE — 85025 COMPLETE CBC W/AUTO DIFF WBC: CPT | Performed by: EMERGENCY MEDICINE

## 2023-02-05 ENCOUNTER — PATIENT MESSAGE (OUTPATIENT)
Dept: ENDOCRINOLOGY CLINIC | Facility: CLINIC | Age: 63
End: 2023-02-05

## 2023-02-05 RX ORDER — NAPROXEN 500 MG/1
500 TABLET ORAL 2 TIMES DAILY WITH MEALS
Qty: 60 TABLET | Refills: 0 | Status: SHIPPED | OUTPATIENT
Start: 2023-02-05

## 2023-02-05 NOTE — TELEPHONE ENCOUNTER
Refill passed per [facility] protocol. Dr. Benjamin Ervin, Please review HIGH interaction warning. Requested Prescriptions   Pending Prescriptions Disp Refills    naproxen 500 MG Oral Tab 60 tablet 0     Sig: Take 1 tablet (500 mg total) by mouth 2 (two) times daily with meals. Non-Narcotic Pain Medication Protocol Passed - 2/5/2023  1:29 AM        Passed - In person appointment or virtual visit in the past 6 mos or appointment in next 3 mos     Recent Outpatient Visits              1 month ago Diabetes mellitus type 2 without retinopathy ([facility]    [facility] [facility], Jennet Landau, MD    Office Visit    1 month ago Controlled type 2 diabetes mellitus without complication, without long-term current use of insulin [facility])    [address], [address] [facility], [facility], [place] Riding, APRN    Office Visit    3 months ago Primary osteoarthritis of right knee    [address], [address], [place] Elijio He, PA-C    Office Visit    4 months ago Uncontrolled type 2 diabetes mellitus with hyperglycemia [facility])    [address], [address] [address], 101[address] [facility], Have Riding, APRN    Office Visit    5 months ago Diabetes mellitus type 2 without retinopathy [facility])    [address], [address] [facility], Jennet Landau, MD    Office Visit          Future Appointments         Provider Department Appt Notes    In 1 month Don Greco, [address], [place] [address] 3 mos    In 4 months Jose Miguel Toledo MD [address], [address] [facility], [place] Six months check up  12/21/22 patient notes Jada Poe terminates in 2022 and will only have [employer].    Patient aware Fozia Hensley will be out of network in 2023                   Recent Outpatient Visits              1 month ago Diabetes mellitus type 2 without retinopathy ([facility]    [facility] Jose Miguel Roman, Cecilia Jimenez MD    Office Visit    1 month ago Controlled type 2 diabetes mellitus without complication, without long-term current use of insulin [facility])    Chuck Santos, [address] [facility] [address] [place], [place], APRN    Office Visit    3 months ago Primary osteoarthritis of right knee    [employer], [address], [place] Veleria Roman, PA-C    Office Visit    4 months ago Uncontrolled type 2 diabetes mellitus with hyperglycemia [facility])    Chuck Santos, [address], [address] Ashley Ayala, [place], APRN    Office Visit    5 months ago Diabetes mellitus type 2 without retinopathy [facility])    Chuck Santos, 602 [facility]    Office Visit           Future Appointments         Provider Department Appt Notes    In 1 month Larue Morning, [address] [facility] [address], [address] [facility] 3 mos    In 4 months Ana Weiss MD [facility], [address] [place], [place] [address] Six months check up  12/21/22 patient notes Jada Poe terminates in 2022 and will only have [employer].    Patient aware Jai Rojas will be out of network in 2023

## 2023-02-06 RX ORDER — TRAMADOL HYDROCHLORIDE 50 MG/1
50 TABLET ORAL 2 TIMES DAILY PRN
Qty: 30 TABLET | Refills: 0 | Status: SHIPPED | OUTPATIENT
Start: 2023-02-06

## 2023-02-06 RX ORDER — VALACYCLOVIR HYDROCHLORIDE 1 G/1
2000 TABLET, FILM COATED ORAL EVERY 12 HOURS SCHEDULED
Qty: 4 TABLET | Refills: 3 | OUTPATIENT
Start: 2023-02-06

## 2023-02-06 NOTE — TELEPHONE ENCOUNTER
Please review. Protocol failed or has no protocol. Requested Prescriptions   Pending Prescriptions Disp Refills    traMADol 50 MG Oral Tab 30 tablet 0     Sig: Take 1 tablet (50 mg total) by mouth 2 (two) times daily as needed for Pain. There is no refill protocol information for this order          Recent Outpatient Visits              1 month ago Diabetes mellitus type 2 without retinopathy ([facility]    [address], [address] [facility]    Office Visit    1 month ago Controlled type 2 diabetes mellitus without complication, without long-term current use of insulin [facility])    [facility], [address] [place] [address], Paul Bence, APRN    Office Visit    3 months ago Primary osteoarthritis of right knee    [address], [address], [place] Guerry Ralphs, PA-C    Office Visit    4 months ago Uncontrolled type 2 diabetes mellitus with hyperglycemia [facility])    [address], [address] [facility],[address], 1019 [facility] [place], Paul Bence, APRN    Office Visit    5 months ago Diabetes mellitus type 2 without retinopathy [facility])    [address], 602 [facility]    Office Visit            Future Appointments         Provider Department Appt Notes    In 1 month Roselyn Ibanez, [address], [facility] 3 mos    In 4 months Garcia Buenrostro MD [address], 602 [facility] Six months check up  12/21/22 patient notes [employer] terminates in 2022 and will only have [employer].    Patient aware Vipul Dumas will be out of network in 2023

## 2023-02-07 ENCOUNTER — MED REC SCAN ONLY (OUTPATIENT)
Dept: ENDOCRINOLOGY CLINIC | Facility: CLINIC | Age: 63
End: 2023-02-07

## 2023-02-07 NOTE — TELEPHONE ENCOUNTER
[place] -[id number]  Claim process [phone]    [facility] [place] form filled out and faxed back to insurance. Waiting for determination. Faxed form along with last chart note and A1C trends.

## 2023-02-07 NOTE — TELEPHONE ENCOUNTER
From: Kika Candelario  To: BENTON Lopez  Sent: 2/5/2023 1:28 AM CST  Subject: Ozempic prescription refill    I have a prescription for Ozempic that has refills. I have a refill on hold because the pharmacy informed me that my medicaid insurance is requesting a call from you to complete the refill process and making sure it will be covered.  Please keep me posted

## 2023-02-08 NOTE — TELEPHONE ENCOUNTER
Received denial from [facility] stating there are equally effective therapies available  W/o PA. Use preferred Trulicity or Victoza. For questions/discussion: [phone]  Tracking #: [id number]    Will route to provider for alternative plan. It does not look like patient t/f preferred drugs specified on denial letter.

## 2023-02-09 RX ORDER — DULAGLUTIDE 4.5 MG/.5ML
4.5 INJECTION, SOLUTION SUBCUTANEOUS WEEKLY
Qty: 2 ML | Refills: 3 | Status: SHIPPED | OUTPATIENT
Start: 2023-02-09

## 2023-03-07 NOTE — TELEPHONE ENCOUNTER
Please review refill protocol failed/ no protocol  Requested Prescriptions   Pending Prescriptions Disp Refills    ATORVASTATIN 40 MG Oral Tab [Pharmacy Med Name: Atorvastatin Calcium 40 Mg Tab Nort] 90 tablet 0     Sig: TAKE ONE TABLET BY MOUTH ONE TIME DAILY       Cholesterol Medication Protocol Failed - 3/6/2023 10:04 PM        Failed - ALT in past 12 months        Failed - Last ALT < 80     Lab Results   Component Value Date    ALT 16 10/05/2021             Passed - LDL in past 12 months        Passed - Last LDL < 130     No results found for: LDL          Passed - In person appointment or virtual visit in the past 12 mos or appointment in next 3 mos     Recent Outpatient Visits              2 months ago Diabetes mellitus type 2 without retinopathy ([facility]    [facility], Chu Covington MD    Office Visit    2 months ago Controlled type 2 diabetes mellitus without complication, without long-term current use of insulin [facility])    [address], 602 [facility], 101[address] [facility], [facility]    Office Visit    4 months ago Primary osteoarthritis of right knee    [address], [address], [place] Claudia Grijalva PA-C    Office Visit    5 months ago Uncontrolled type 2 diabetes mellitus with hyperglycemia [facility])    [address], [address], [facility], [facility]    Office Visit    6 months ago Diabetes mellitus type 2 without retinopathy [facility])    [address], 602 [facility], Chu Covington MD    Office Visit          Future Appointments         Provider Department Appt Notes    In 2 days Claudia Grijalva PA-C [facility], [address], [place] Right  knee injection    In 2 weeks Lonny Cortez, [address], [address] [place] [address], [address] 3 mos    In 1 month Kade Talbot MD [address], [facility] [address], [place] Regular exam

## 2023-03-08 ENCOUNTER — OFFICE VISIT (OUTPATIENT)
Dept: ORTHOPEDICS CLINIC | Facility: CLINIC | Age: 63
End: 2023-03-08
Payer: MEDICAID

## 2023-03-08 DIAGNOSIS — M17.11 PRIMARY OSTEOARTHRITIS OF RIGHT KNEE: Primary | ICD-10-CM

## 2023-03-08 PROCEDURE — 99214 OFFICE O/P EST MOD 30 MIN: CPT | Performed by: PHYSICIAN ASSISTANT

## 2023-03-08 PROCEDURE — 20610 DRAIN/INJ JOINT/BURSA W/O US: CPT | Performed by: PHYSICIAN ASSISTANT

## 2023-03-08 RX ORDER — TRIAMCINOLONE ACETONIDE 40 MG/ML
40 INJECTION, SUSPENSION INTRA-ARTICULAR; INTRAMUSCULAR ONCE
Status: COMPLETED | OUTPATIENT
Start: 2023-03-08 | End: 2023-03-08

## 2023-03-08 RX ORDER — ATORVASTATIN CALCIUM 40 MG/1
40 TABLET, FILM COATED ORAL DAILY
Qty: 90 TABLET | Refills: 3 | Status: SHIPPED | OUTPATIENT
Start: 2023-03-08

## 2023-03-08 RX ADMIN — TRIAMCINOLONE ACETONIDE 40 MG: 40 INJECTION, SUSPENSION INTRA-ARTICULAR; INTRAMUSCULAR at 11:24:00

## 2023-03-08 NOTE — PROGRESS NOTES
[facility] Progress Note      Chief Complaint:  Right knee pain      Subjective: Oswald Randall is a 58year old female who is here for follow-up of her right knee. She has a known diagnosis of degenerative arthritis and has been seen in the past for aspiration and cortisone injections. She states that the last injection provided 4-1/2 months of relief and pain has recently started to recur. She knows increase in swelling. Pain is localized to the medial aspect of the knee. She is here for repeat procedure today. Objective: She is a pleasant 19-year-old female in no acute distress. She ambulates with a nonantalgic gait. Exam of the right knee and lower extremity reveals that the overlying skin is intact. She does have a mild effusion. She has full extension without pain. She is tender about the medial joint line. No lateral joint line tenderness. Sensation is present to light touch. Assessment: Right knee degenerative joint disease      Plan: I discussed with the patient that it appears she has a recurrence of her arthritic flareup with a small effusion. Would be reasonable to go ahead with an aspiration and cortisone injection to this knee. I also discussed the option for a Zilretta injection as this may provide longer lasting relief. A brochure was given. She is able to repeat the cortisone injections every 3 to 4 months. We also discussed the option for Monovisc if the knee becomes painful but no significant synovitis. She will follow-up with recurrent symptoms or with any other questions or concerns. Procedure: Risk, benefits, and alternatives to the aspiration and cortisone injection including but not limited to risk of needle infection, flareup, and failure to improve was discussed. She would like to proceed and under meticulous sterile technique, 4 cc of Xylocaine was used to anesthetize the lateral patellofemoral joint along with topical anesthetic spray.   Then through an 18-gauge needle, 10 cc of slightly bloody serosanguineous fluid was aspirated. Through the same 18-gauge needle, 4 cc of Xylocaine and Marcaine along with 40 mg of Kenalog was injected with free flow fluid. She tolerated the injection well and a Band-Aid was applied. She was advised to follow-up with any adverse reactions.       A student was present during the visit after the patient's consent      Amisha Yi  Orthopedic Surgery   [facility]  [address], [address]   t: [phone]  f: [phone]

## 2023-03-22 ENCOUNTER — LAB ENCOUNTER (OUTPATIENT)
Dept: LAB | Facility: HOSPITAL | Age: 63
End: 2023-03-22
Payer: MEDICAID

## 2023-03-22 ENCOUNTER — OFFICE VISIT (OUTPATIENT)
Dept: ENDOCRINOLOGY CLINIC | Facility: CLINIC | Age: 63
End: 2023-03-22

## 2023-03-22 VITALS
BODY MASS INDEX: 31 KG/M2 | WEIGHT: 167 LBS | SYSTOLIC BLOOD PRESSURE: 119 MMHG | DIASTOLIC BLOOD PRESSURE: 68 MMHG | HEART RATE: 97 BPM

## 2023-03-22 DIAGNOSIS — E11.65 UNCONTROLLED TYPE 2 DIABETES MELLITUS WITH HYPERGLYCEMIA (HCC): ICD-10-CM

## 2023-03-22 DIAGNOSIS — E11.9 CONTROLLED TYPE 2 DIABETES MELLITUS WITHOUT COMPLICATION, WITHOUT LONG-TERM CURRENT USE OF INSULIN (HCC): Primary | ICD-10-CM

## 2023-03-22 DIAGNOSIS — E11.9 DIABETES MELLITUS TYPE 2 WITHOUT RETINOPATHY (HCC): ICD-10-CM

## 2023-03-22 LAB
ALBUMIN SERPL-MCNC: 3.8 G/DL (ref 3.4–5)
ALBUMIN/GLOB SERPL: 0.9 {RATIO} (ref 1–2)
ALP LIVER SERPL-CCNC: 149 U/L
ALT SERPL-CCNC: 25 U/L
ANION GAP SERPL CALC-SCNC: 10 MMOL/L (ref 0–18)
AST SERPL-CCNC: 23 U/L (ref 15–37)
BILIRUB SERPL-MCNC: 0.8 MG/DL (ref 0.1–2)
BUN BLD-MCNC: 13 MG/DL (ref 7–18)
BUN/CREAT SERPL: 16.3 (ref 10–20)
CALCIUM BLD-MCNC: 9.7 MG/DL (ref 8.5–10.1)
CARTRIDGE LOT#: ABNORMAL NUMERIC
CHLORIDE SERPL-SCNC: 106 MMOL/L (ref 98–112)
CHOLEST SERPL-MCNC: 147 MG/DL (ref ?–200)
CO2 SERPL-SCNC: 25 MMOL/L (ref 21–32)
CREAT BLD-MCNC: 0.8 MG/DL
CREAT UR-SCNC: 123 MG/DL
DEPRECATED RDW RBC AUTO: 47.1 FL (ref 35.1–46.3)
ERYTHROCYTE [DISTWIDTH] IN BLOOD BY AUTOMATED COUNT: 13.8 % (ref 11–15)
FASTING PATIENT LIPID ANSWER: NO
FASTING STATUS PATIENT QL REPORTED: NO
GFR SERPLBLD BASED ON 1.73 SQ M-ARVRAT: 83 ML/MIN/1.73M2 (ref 60–?)
GLOBULIN PLAS-MCNC: 4.3 G/DL (ref 2.8–4.4)
GLUCOSE BLD-MCNC: 132 MG/DL (ref 70–99)
GLUCOSE BLOOD: 151
HCT VFR BLD AUTO: 47.8 %
HDLC SERPL-MCNC: 72 MG/DL (ref 40–59)
HEMOGLOBIN A1C: 6.3 % (ref 4.3–5.6)
HGB BLD-MCNC: 15.3 G/DL
LDLC SERPL CALC-MCNC: 54 MG/DL (ref ?–100)
MCH RBC QN AUTO: 29.5 PG (ref 26–34)
MCHC RBC AUTO-ENTMCNC: 32 G/DL (ref 31–37)
MCV RBC AUTO: 92.3 FL
MICROALBUMIN UR-MCNC: 0.81 MG/DL
MICROALBUMIN/CREAT 24H UR-RTO: 6.6 UG/MG (ref ?–30)
NONHDLC SERPL-MCNC: 75 MG/DL (ref ?–130)
OSMOLALITY SERPL CALC.SUM OF ELEC: 294 MOSM/KG (ref 275–295)
PLATELET # BLD AUTO: 310 10(3)UL (ref 150–450)
POTASSIUM SERPL-SCNC: 4.4 MMOL/L (ref 3.5–5.1)
PROT SERPL-MCNC: 8.1 G/DL (ref 6.4–8.2)
RBC # BLD AUTO: 5.18 X10(6)UL
SODIUM SERPL-SCNC: 141 MMOL/L (ref 136–145)
TEST STRIP LOT #: NORMAL NUMERIC
TRIGL SERPL-MCNC: 122 MG/DL (ref 30–149)
VLDLC SERPL CALC-MCNC: 18 MG/DL (ref 0–30)
WBC # BLD AUTO: 7.9 X10(3) UL (ref 4–11)

## 2023-03-22 PROCEDURE — 82570 ASSAY OF URINE CREATININE: CPT

## 2023-03-22 PROCEDURE — 80053 COMPREHEN METABOLIC PANEL: CPT

## 2023-03-22 PROCEDURE — 85027 COMPLETE CBC AUTOMATED: CPT

## 2023-03-22 PROCEDURE — 36415 COLL VENOUS BLD VENIPUNCTURE: CPT

## 2023-03-22 PROCEDURE — 80061 LIPID PANEL: CPT

## 2023-03-22 PROCEDURE — 82043 UR ALBUMIN QUANTITATIVE: CPT

## 2023-03-22 PROCEDURE — 3061F NEG MICROALBUMINURIA REV: CPT | Performed by: INTERNAL MEDICINE

## 2023-04-13 ENCOUNTER — PATIENT MESSAGE (OUTPATIENT)
Dept: ENDOCRINOLOGY CLINIC | Facility: CLINIC | Age: 63
End: 2023-04-13

## 2023-04-13 RX ORDER — TIRZEPATIDE 10 MG/.5ML
10 INJECTION, SOLUTION SUBCUTANEOUS
Qty: 6 ML | Refills: 0 | Status: SHIPPED | OUTPATIENT
Start: 2023-04-13

## 2023-04-13 NOTE — TELEPHONE ENCOUNTER
Amina,    Please see below. I have pended Mounjaro 10mg/weekly as that is the only dose listed in her chart. Please route back when sent in so we can submit PA.    LOV 1/69/1313  - Continue Trulicity 0.7DS subcutaneous weekly. She has just finished 4th injection from her first month. Although insurance not covering Mounjaro or Ozempic, they did provide a one month temporary supply of Mounjaro so she would like to switch back to this for the next month. She has better glucose readings with Mounjaro compared to Trulicity.     -Will resubmit [place] [facility] now that she has tried Trulicity. Did discuss with patient because Medicaid also prefers Victoza it is likely she will need to try and fail this as well. She understands plan.

## 2023-04-13 NOTE — TELEPHONE ENCOUNTER
From: Bonny Velez  To: BENTON Anthony  Sent: 4/13/2023 8:28 AM CDT  Subject: Leo Sensing prescription     I have taken my third weekly injection of mounjaro. Per our previous conversation, will you please request a new prescription? Thank you.

## 2023-05-02 ENCOUNTER — TELEPHONE (OUTPATIENT)
Facility: CLINIC | Age: 63
End: 2023-05-02

## 2023-05-02 ENCOUNTER — OFFICE VISIT (OUTPATIENT)
Facility: CLINIC | Age: 63
End: 2023-05-02

## 2023-05-02 VITALS
HEIGHT: 62 IN | OXYGEN SATURATION: 98 % | RESPIRATION RATE: 14 BRPM | HEART RATE: 68 BPM | BODY MASS INDEX: 29.81 KG/M2 | DIASTOLIC BLOOD PRESSURE: 70 MMHG | WEIGHT: 162 LBS | SYSTOLIC BLOOD PRESSURE: 114 MMHG

## 2023-05-02 DIAGNOSIS — Z00.00 ADULT GENERAL MEDICAL EXAM: Primary | ICD-10-CM

## 2023-05-02 DIAGNOSIS — Z12.31 ENCOUNTER FOR SCREENING MAMMOGRAM FOR BREAST CANCER: ICD-10-CM

## 2023-05-02 DIAGNOSIS — Z12.4 SCREENING FOR CERVICAL CANCER: ICD-10-CM

## 2023-05-02 DIAGNOSIS — E11.9 DIABETES MELLITUS TYPE 2 WITHOUT RETINOPATHY (HCC): ICD-10-CM

## 2023-05-02 PROCEDURE — 3078F DIAST BP <80 MM HG: CPT | Performed by: INTERNAL MEDICINE

## 2023-05-02 PROCEDURE — 99396 PREV VISIT EST AGE 40-64: CPT | Performed by: INTERNAL MEDICINE

## 2023-05-02 PROCEDURE — 3008F BODY MASS INDEX DOCD: CPT | Performed by: INTERNAL MEDICINE

## 2023-05-02 PROCEDURE — 3074F SYST BP LT 130 MM HG: CPT | Performed by: INTERNAL MEDICINE

## 2023-05-02 NOTE — PROGRESS NOTES
Per patient he has not seen the Ophthalmologist for her Dilated Eye Exam,I have printed the referral from Maria Luisa

## 2023-05-02 NOTE — TELEPHONE ENCOUNTER
Per patient he has not seen the Ophthalmologist for her Dilated Eye Exam,I have printed the referral from Maria Luisa. I have pended the Mammogram Order at time of ov with PCP.

## 2023-05-16 RX ORDER — MONTELUKAST SODIUM 10 MG/1
10 TABLET ORAL DAILY
Qty: 90 TABLET | Refills: 3 | Status: SHIPPED | OUTPATIENT
Start: 2023-05-16

## 2023-05-16 RX ORDER — FLUTICASONE PROPIONATE AND SALMETEROL 250; 50 UG/1; UG/1
1 POWDER RESPIRATORY (INHALATION) EVERY 12 HOURS SCHEDULED
Qty: 60 EACH | Refills: 3 | Status: SHIPPED | OUTPATIENT
Start: 2023-05-16

## 2023-05-17 ENCOUNTER — TELEPHONE (OUTPATIENT)
Dept: INTERNAL MEDICINE CLINIC | Facility: CLINIC | Age: 63
End: 2023-05-17

## 2023-05-17 NOTE — TELEPHONE ENCOUNTER
Refill passed per [facility] protocol. Requested Prescriptions   Pending Prescriptions Disp Refills    MONTELUKAST 10 MG Oral Tab [Pharmacy Med Name: Montelukast Sodium 10 Mg Tab Auro] 90 tablet 0     Sig: Take 1 tablet by mouth daily. Asthma & COPD Medication Protocol Passed - 5/16/2023 12:09 PM        Passed - In person appointment or virtual visit in the past 6 mos or appointment in next 3 mos     Recent Outpatient Visits              2 weeks ago Adult general medical exam    [address], 602 [facility]    Office Visit    1 month ago Controlled type 2 diabetes mellitus without complication, without long-term current use of insulin [facility])    [facility], [address] [facility], Courtney Loza, BENTON    Office Visit    2 months ago Primary osteoarthritis of right knee    [facility], [address], [place] Darral JAMAICA [place]    Office Visit    4 months ago Diabetes mellitus type 2 without retinopathy [facility])    [address], Nichole Brown MD    Office Visit    4 months ago Controlled type 2 diabetes mellitus without complication, without long-term current use of insulin [facility])    [address], 602 [facility], Courtney Loza, BENTON    Office Visit          Future Appointments         Provider Department Appt Notes    In 5 months Narcisa Adam MD [facility], [address]                  FLUTICASONE-SALMETEROL 250-50 MCG/ACT Inhalation Aerosol Powder, Breath Activated [place] Med Name: Fluticasone Propionate/Salmeterol Diskus 250-50mcg/ Aer Pras]  0     Sig: Inhale 1 puff into the lungs every 12 (twelve) hours.        Asthma & COPD Medication Protocol Passed - 5/16/2023 12:09 PM        Passed - In person appointment or virtual visit in the past 6 mos or appointment in next 3 mos     Recent Outpatient Visits              2 weeks ago Adult general medical exam    Barb Cifuentes MD    Office Visit    1 month ago Controlled type 2 diabetes mellitus without complication, without long-term current use of insulin [facility])    [facility], [address] [place] [address] Ashley Ayala, Courtney Patience, APRN    Office Visit    2 months ago Primary osteoarthritis of right knee    [employer], [address], [place] Darral Brome, PA-C    Office Visit    4 months ago Diabetes mellitus type 2 without retinopathy [facility])    [address], Hemant Brown MD    Office Visit    4 months ago Controlled type 2 diabetes mellitus without complication, without long-term current use of insulin [facility])    [address], 602 [facility], Courtney Loza, APRN    Office Visit          Future Appointments         Provider Department Appt Notes    In 5 months Narcisa dAam MD [address], 602 [facility]                      Future Appointments         Provider Department Appt Notes    In 5 months Narcisa Adam MD [address], [address] [facility]              Recent Outpatient Visits              2 weeks ago Adult general medical exam    [address], 602 [facility]    Office Visit    1 month ago Controlled type 2 diabetes mellitus without complication, without long-term current use of insulin [facility])    [facility], 602 [facility], [place] Courtney Razo Patikassi, APRN    Office Visit    2 months ago Primary osteoarthritis of right knee    [address], 12 [facility], [place] Darral Brome, PA-C    Office Visit    4 months ago Diabetes mellitus type 2 without retinopathy [facility])    [address], [place], [place] Zoila Tiwari MD    Office Visit    4 months ago Controlled type 2 diabetes mellitus without complication, without long-term current use of insulin [facility])    [facility], 602 [facility], [address] [place] [address], [facility]    Office Visit

## 2023-06-12 ENCOUNTER — TELEPHONE (OUTPATIENT)
Dept: ENDOCRINOLOGY CLINIC | Facility: CLINIC | Age: 63
End: 2023-06-12

## 2023-06-15 ENCOUNTER — PATIENT MESSAGE (OUTPATIENT)
Dept: ENDOCRINOLOGY CLINIC | Facility: CLINIC | Age: 63
End: 2023-06-15

## 2023-06-15 DIAGNOSIS — E11.9 DIABETES MELLITUS TYPE 2 WITHOUT RETINOPATHY (HCC): Primary | ICD-10-CM

## 2023-06-15 NOTE — TELEPHONE ENCOUNTER
Medication PA Requested: Tirzepatide (MOUNJARO) 10 MG/0.5ML Subcutaneous Solution Pen-injector,                                                          CoverMyMeds Used:  Key: [id number]  Quantity: 6 mL  Day Supply: 90 days  Sig:  Inject 10 mg into the skin every 7 days  DX Code: E11.9                                    CPT code (if applicable):   Case Number/Pending Ref#:

## 2023-06-15 NOTE — TELEPHONE ENCOUNTER
[facility] states they need chart notes and medical records to complete.   Please fax to [phone]    PA Case Ref # is [id number]

## 2023-06-17 NOTE — TELEPHONE ENCOUNTER
From: Angelica Daniel  To: Lafonda Sacks, APRN  Sent: 6/15/2023 9:52 AM CDT  Subject: Haider Hoover     I took my last dosage of mounjaro on Tuesday. My insurance authorized a 90 day supply. I need pre authorization again per my pharmacy. What is my recourse if it is not prescribed to me?

## 2023-06-17 NOTE — TELEPHONE ENCOUNTER
[place] - pended refill on the [facility], if appropriate. Ty! Sent MCM to the pt - pended refill Rx for [facility]. Pt replied to message, [facility] pharmacy is fine.

## 2023-06-19 RX ORDER — TIRZEPATIDE 10 MG/.5ML
10 INJECTION, SOLUTION SUBCUTANEOUS
Qty: 6 ML | Refills: 0 | Status: SHIPPED | OUTPATIENT
Start: 2023-06-19

## 2023-06-23 NOTE — TELEPHONE ENCOUNTER
Stacy Ely,    Please advise patient's typically have to try every GLP 1 before medicaid covers mounjaro.  Patient has tried and failed: Vivek Hicks

## 2023-06-26 NOTE — TELEPHONE ENCOUNTER
I had discussed this with patient at last visit. We may need to try sending prescription for Rybelsus for one month and then can resubmit PA for [facility]. If she is ok with this plan I can send prescription for her.

## 2023-07-03 ENCOUNTER — HOSPITAL ENCOUNTER (OUTPATIENT)
Dept: MAMMOGRAPHY | Age: 63
Discharge: HOME | End: 2023-07-03
Attending: INTERNAL MEDICINE
Payer: MEDICAID

## 2023-07-03 DIAGNOSIS — Z12.31 ENCOUNTER FOR SCREENING MAMMOGRAM FOR BREAST CANCER: ICD-10-CM

## 2023-07-03 LAB — HM MAMMOGRAPHY BILATERAL: NORMAL

## 2023-07-03 PROCEDURE — 77063 BREAST TOMOSYNTHESIS BI: CPT | Performed by: INTERNAL MEDICINE

## 2023-07-03 PROCEDURE — 77067 SCR MAMMO BI INCL CAD: CPT | Performed by: INTERNAL MEDICINE

## 2023-07-21 ENCOUNTER — HOSPITAL ENCOUNTER (OUTPATIENT)
Age: 63
Discharge: HOME | End: 2023-07-21
Attending: EMERGENCY MEDICINE
Payer: MEDICAID

## 2023-07-21 VITALS
HEART RATE: 96 BPM | SYSTOLIC BLOOD PRESSURE: 133 MMHG | OXYGEN SATURATION: 98 % | DIASTOLIC BLOOD PRESSURE: 76 MMHG | TEMPERATURE: 98 F | RESPIRATION RATE: 18 BRPM

## 2023-07-21 DIAGNOSIS — N30.00 ACUTE CYSTITIS WITHOUT HEMATURIA: Primary | ICD-10-CM

## 2023-07-21 LAB
BILIRUB UR QL STRIP: NEGATIVE
GLUCOSE UR STRIP-MCNC: NEGATIVE MG/DL
HGB UR QL STRIP: NEGATIVE
KETONES UR STRIP-MCNC: NEGATIVE MG/DL
LEUKOCYTE ESTERASE UR QL STRIP: NEGATIVE
NITRITE UR QL STRIP: POSITIVE
PH UR STRIP: 5.5 [PH]
PROT UR STRIP-MCNC: NEGATIVE MG/DL
SP GR UR STRIP: 1.02
UROBILINOGEN UR STRIP-ACNC: 2 MG/DL

## 2023-07-21 PROCEDURE — 99214 OFFICE O/P EST MOD 30 MIN: CPT

## 2023-07-21 PROCEDURE — 99213 OFFICE O/P EST LOW 20 MIN: CPT

## 2023-07-21 PROCEDURE — 81002 URINALYSIS NONAUTO W/O SCOPE: CPT

## 2023-07-21 RX ORDER — FLUCONAZOLE 150 MG/1
150 TABLET ORAL ONCE
Qty: 1 TABLET | Refills: 0 | Status: SHIPPED | OUTPATIENT
Start: 2023-07-21 | End: 2023-07-21

## 2023-07-21 RX ORDER — CEFADROXIL 500 MG/1
500 CAPSULE ORAL 2 TIMES DAILY
Qty: 14 CAPSULE | Refills: 0 | Status: SHIPPED | OUTPATIENT
Start: 2023-07-21 | End: 2023-07-28

## 2023-07-28 RX ORDER — CLOTRIMAZOLE 2 G/100G
1 CREAM VAGINAL NIGHTLY
Qty: 21 G | Refills: 0 | OUTPATIENT
Start: 2023-07-28

## 2023-07-30 ENCOUNTER — PATIENT MESSAGE (OUTPATIENT)
Dept: ENDOCRINOLOGY CLINIC | Facility: CLINIC | Age: 63
End: 2023-07-30

## 2023-07-30 DIAGNOSIS — E11.9 DIABETES MELLITUS TYPE 2 WITHOUT RETINOPATHY (HCC): Primary | ICD-10-CM

## 2023-07-31 ENCOUNTER — TELEPHONE (OUTPATIENT)
Dept: INTERNAL MEDICINE CLINIC | Facility: CLINIC | Age: 63
End: 2023-07-31

## 2023-07-31 RX ORDER — CLOTRIMAZOLE 2 G/100G
CREAM VAGINAL
Qty: 21 G | Refills: 0 | Status: SHIPPED | OUTPATIENT
Start: 2023-07-31

## 2023-07-31 RX ORDER — CLOTRIMAZOLE 1 %
CREAM (GRAM) TOPICAL
Qty: 21 G | Refills: 0 | Status: CANCELLED | OUTPATIENT
Start: 2023-07-31

## 2023-07-31 NOTE — TELEPHONE ENCOUNTER
Pt stated that she had been to the [facility] on 7/21/23 and she was Dx with a UTI and she was given a abx. Pt stated that  when she is placed on Abx she does get a yeast infection. Pt was given Diflucan on 7/21 but pt still has the itchiness so pt was requesting to also get this Vaginal cream to help with the itchiness. I have pended her request. Pt still has the itchiness and will like to know if she can get his  vaginal cream as it has helped her in the past when Dr. Vicki Hilton will give it to her for her yeast infections.  Please advise

## 2023-07-31 NOTE — TELEPHONE ENCOUNTER
Vaginal cream approved. She is due for diabetes eye exam and GYN evaluation for Pap smear. Please instruct her to make appointment.

## 2023-07-31 NOTE — TELEPHONE ENCOUNTER
From: Pillo Ohara  To: BENTON Bonner  Sent: 7/30/2023 11:20 PM CDT  Subject: Medications     Hi. I got the rebelsus last month and continued taking medication that I had on hand. Can we revisit getting approval for either mounjaro or Ozempic. ? 11-Apr-2022 14:35

## 2023-07-31 NOTE — TELEPHONE ENCOUNTER
Spoke to patient. She was unable to tolerate Rybelsus and so returned to using Trulicity. Has also tried and failed Victoza in the past due to side effects and inadequate response. Please proceed with PA for Ozempic.

## 2023-07-31 NOTE — TELEPHONE ENCOUNTER
Pt stated that she had been to the [facility] on 7/21/23 and she was Dx with a UTI and she was given a abx. Pt stated that she when she is placed on Abx she does get a yeast infection. Pt was given Diflucan on 7/21 but pt still has the itchiness so pt was requesting to also get this Vaginal cream to help with the itchiness. Pt still has the itchiness and will like to know if she can get his  vaginal cream as it has helped her in the past when Dr. Arley Millard will give it to her for her yeast infections.  See condition update encounter 7/31/2023

## 2023-08-09 ENCOUNTER — OFFICE VISIT (OUTPATIENT)
Dept: ORTHOPEDICS CLINIC | Facility: CLINIC | Age: 63
End: 2023-08-09
Payer: MEDICAID

## 2023-08-09 VITALS — BODY MASS INDEX: 29.81 KG/M2 | HEIGHT: 62 IN | WEIGHT: 162 LBS

## 2023-08-09 DIAGNOSIS — M65.9 SYNOVITIS OF KNEE: ICD-10-CM

## 2023-08-09 DIAGNOSIS — M17.11 PRIMARY OSTEOARTHRITIS OF RIGHT KNEE: Primary | ICD-10-CM

## 2023-08-09 PROCEDURE — 99214 OFFICE O/P EST MOD 30 MIN: CPT | Performed by: PHYSICIAN ASSISTANT

## 2023-08-09 PROCEDURE — 3008F BODY MASS INDEX DOCD: CPT | Performed by: PHYSICIAN ASSISTANT

## 2023-08-09 PROCEDURE — 20610 DRAIN/INJ JOINT/BURSA W/O US: CPT | Performed by: PHYSICIAN ASSISTANT

## 2023-08-09 RX ORDER — TRIAMCINOLONE ACETONIDE 40 MG/ML
40 INJECTION, SUSPENSION INTRA-ARTICULAR; INTRAMUSCULAR ONCE
Status: COMPLETED | OUTPATIENT
Start: 2023-08-09 | End: 2023-08-09

## 2023-08-09 RX ADMIN — TRIAMCINOLONE ACETONIDE 40 MG: 40 INJECTION, SUSPENSION INTRA-ARTICULAR; INTRAMUSCULAR at 11:00:00

## 2023-08-09 NOTE — PROGRESS NOTES
[facility] Progress Note      Chief Complaint:  Right knee pain and swelling       Subjective: Patricia Calloway is a 61year old female who is here for follow-up of her right knee pain and swelling. She has a known diagnosis of degenerative arthritis and has been seen in the past for aspiration and cortisone injections. Her most recent injection was approximately 5 months ago. She states that this provided relief and symptoms recently started to recur. She notes a bulge at the lateral aspect of the knee. Otherwise she has mild discomfort but no significant pain of the knee. She is traveling to [facility] next week to visit her daughter. She is wondering if she we can repeat the aspiration and cortisone injection today. Objective: Exam of the right knee and lower extremity reveals that the overlying skin is intact. She does have a mild effusion. No medial or lateral joint line tenderness. Sensation is present to light touch. Stable ligamentous exam.      Assessment: Recurrent effusion with right knee degenerative joint disease      Plan: Her symptoms have recurred and she does have a small joint effusion. She would like to proceed with an aspiration and cortisone injection and this is reasonable today. She will follow-up with me with any recurrent symptoms, questions or concerns. She can consider gel injections if the knee does not have a significant effusion and the pain is more achy in nature. She will follow-up as mentioned or sooner with questions or concerns. Procedure: Risk, benefits, and alternatives to the aspiration and cortisone injection including but not limited to risk of needle infection, flareup, and failure to improve was discussed. She would like to proceed and under meticulous sterile technique, 4 cc of Xylocaine was used to anesthetize the lateral patellofemoral joint of the right knee.   Then through an 18-gauge needle, 5 cc of blood-tinged serosanguineous fluid was aspirated without complication. Through the same 18-gauge needle, 4 cc of Xylocaine and 40 mg of Kenalog was injected with free flow of fluid. She tolerated the injection well and a Band-Aid and Ace wrap were applied. She was advised to follow-up with any adverse reactions.             Elida Monae PA-C  Orthopedic Surgery   [facility]  [address]   t: [phone]  f: [phone]

## 2023-08-17 RX ORDER — ALBUTEROL SULFATE 90 UG/1
AEROSOL, METERED RESPIRATORY (INHALATION)
Qty: 18 G | Refills: 2 | Status: SHIPPED | OUTPATIENT
Start: 2023-08-17

## 2023-08-17 NOTE — TELEPHONE ENCOUNTER
Refill passed per [facility], [facility] protocol.   Requested Prescriptions   Pending Prescriptions Disp Refills    ALBUTEROL 108 (90 Base) MCG/ACT Inhalation Aero Soln [Pharmacy Med Name: Albuterol Sulfate Hfa 108 Mcg/Act Aer Pras] 18 g 0     Sig: Inhale 1 puff into the lungs every six hours as needed       Asthma & COPD Medication Protocol Passed - 8/17/2023 10:38 AM        Passed - In person appointment or virtual visit in the past 6 mos or appointment in next 3 mos     Recent Outpatient Visits              1 week ago Primary osteoarthritis of right knee    [facility], [facility]    Office Visit    3 months ago Adult general medical exam    [address], [address] [facility]    Office Visit    4 months ago Controlled type 2 diabetes mellitus without complication, without long-term current use of insulin [facility])    [facility], [facility] [place] APRN    Office Visit    5 months ago Primary osteoarthritis of right knee    [employer] [employer] [facility], [address], [place] rafael Falk, PA-C    Office Visit    7 months ago Diabetes mellitus type 2 without retinopathy [facility])    [facility]    Office Visit          Future Appointments         Provider Department Appt Notes    In 2 months Cristhian Martin MD [address], [address] [facility]                   Recent Outpatient Visits              1 week ago Primary osteoarthritis of right knee    [address], [facility] BRAYDEN FalkC    Office Visit    3 months ago Adult general medical exam    Nichole Singleton MD    Office Visit    4 months ago Controlled type 2 diabetes mellitus without complication, without long-term current use of insulin [facility])    [facility], [facility] [place], [place], [place]    Office Visit    5 months ago Primary osteoarthritis of right knee    [address], [facility] [address], [place] Zenda Flick, PA-C    Office Visit    7 months ago Diabetes mellitus type 2 without retinopathy [facility])    Carlos Booker, Daphine Holstein, MD    Office Visit          Future Appointments         Provider Department Appt Notes    In 2 months Claude Benitez MD [address], [place] [address]

## 2023-08-22 ENCOUNTER — HOSPITAL ENCOUNTER (OUTPATIENT)
Age: 63
Discharge: HOME | End: 2023-08-22
Attending: EMERGENCY MEDICINE
Payer: MEDICAID

## 2023-08-22 VITALS
TEMPERATURE: 97 F | SYSTOLIC BLOOD PRESSURE: 107 MMHG | HEART RATE: 86 BPM | OXYGEN SATURATION: 98 % | DIASTOLIC BLOOD PRESSURE: 74 MMHG | RESPIRATION RATE: 18 BRPM

## 2023-08-22 DIAGNOSIS — N30.00 ACUTE CYSTITIS WITHOUT HEMATURIA: Primary | ICD-10-CM

## 2023-08-22 LAB
BILIRUB UR QL STRIP: NEGATIVE
COLOR UR: YELLOW
GLUCOSE UR STRIP-MCNC: NEGATIVE MG/DL
KETONES UR STRIP-MCNC: NEGATIVE MG/DL
LEUKOCYTE ESTERASE UR QL STRIP: NEGATIVE
NITRITE UR QL STRIP: POSITIVE
PH UR STRIP: 5.5 [PH]
PROT UR STRIP-MCNC: NEGATIVE MG/DL
SP GR UR STRIP: >=1.03
UROBILINOGEN UR STRIP-ACNC: <2 MG/DL

## 2023-08-22 PROCEDURE — 87186 SC STD MICRODIL/AGAR DIL: CPT | Performed by: EMERGENCY MEDICINE

## 2023-08-22 PROCEDURE — 99214 OFFICE O/P EST MOD 30 MIN: CPT

## 2023-08-22 PROCEDURE — 81002 URINALYSIS NONAUTO W/O SCOPE: CPT

## 2023-08-22 PROCEDURE — 87088 URINE BACTERIA CULTURE: CPT | Performed by: EMERGENCY MEDICINE

## 2023-08-22 PROCEDURE — 87086 URINE CULTURE/COLONY COUNT: CPT | Performed by: EMERGENCY MEDICINE

## 2023-08-22 RX ORDER — CEFADROXIL 500 MG/1
500 CAPSULE ORAL 2 TIMES DAILY
Qty: 14 CAPSULE | Refills: 0 | Status: SHIPPED | OUTPATIENT
Start: 2023-08-22 | End: 2023-08-29

## 2023-08-22 RX ORDER — FLUCONAZOLE 150 MG/1
150 TABLET ORAL ONCE
Qty: 1 TABLET | Refills: 0 | Status: SHIPPED | OUTPATIENT
Start: 2023-08-22 | End: 2023-08-22

## 2023-08-23 ENCOUNTER — PATIENT MESSAGE (OUTPATIENT)
Dept: ENDOCRINOLOGY CLINIC | Facility: CLINIC | Age: 63
End: 2023-08-23

## 2023-08-28 RX ORDER — SEMAGLUTIDE 2.68 MG/ML
2 INJECTION, SOLUTION SUBCUTANEOUS WEEKLY
Qty: 9 ML | Refills: 0 | Status: SHIPPED | OUTPATIENT
Start: 2023-08-28

## 2023-08-28 NOTE — TELEPHONE ENCOUNTER
From: Angelica Daniel  To: Lafonda Sacks, APRN  Sent: 8/23/2023 11:04 AM CDT  Subject: Prescription request    Hi Imani. I haven't received any feedback on the status of getting Ozempic covered by my insurance. Kim Ross informed me that you will be out on maternity leave soon. Is it possible for you to write a prescription for me for semaglutide in my current dosage amount and one for Svetlana (I will have her contact you) in her dosage amount? I will be ordering on line from a holding pharmacy. I do need a prescription to do so. With refills.  Thank you and congratulations!!

## 2023-08-28 NOTE — TELEPHONE ENCOUNTER
Prescription sent for Ozempic 2 mg dose - will require a PA.     PA submitted via epic    Waiting for Payer Response   8/28/2023  1:41 PM  Sending user: Loralie Dakins, RN   Payer: [address] [place] [address]    [phone]

## 2023-08-31 ENCOUNTER — TELEPHONE (OUTPATIENT)
Dept: ENDOCRINOLOGY CLINIC | Facility: CLINIC | Age: 63
End: 2023-08-31

## 2023-08-31 NOTE — TELEPHONE ENCOUNTER
Medication PA Requested: semaglutide (OZEMPIC, 2 MG/DOSE,) 8 MG/3ML Subcutaneous Solution Pen-injector                                                          CoverMyMeds Used:  Key:  Quantity: 9 mL   Day Supply: 90 days   Sig: Inject 2 mg into the skin once a week.    DX Code:  E11.9                                    CPT code (if applicable):   Case Number/Pending Ref#:

## 2023-08-31 NOTE — TELEPHONE ENCOUNTER
Current Outpatient Medications   Medication Sig Dispense Refill    semaglutide (OZEMPIC, 2 MG/DOSE,) 8 MG/3ML Subcutaneous Solution Pen-injector Inject 2 mg into the skin once a week. 9 mL 0     Per pharmacy a prior auth is required.   Key: [id number]

## 2023-09-01 NOTE — TELEPHONE ENCOUNTER
Medication PA Requested: semaglutide (OZEMPIC, 2 MG/DOSE,) 8 MG/3ML Subcutaneous Solution Pen-injector                                                          CoverMyMeds Used:  Key:[id number]   Quantity: 9 mL   Day Supply: 90 days   Sig: Inject 2 mg into the skin once a week. DX Code:  E11.9                                      Cmm pa submitted with LOV 3/22/2023, 12/21/2022, TE 7/30, and notation of t/f metformin, victoza, rybelsus, jardiance and trulicity.   Pa pending

## 2023-09-05 NOTE — TELEPHONE ENCOUNTER
Received fax from [facility] stating the [address] (2MG/DOSE) 8MG/3ML SOL-PEN INJ has been approved up to 1 pen per 28 days and valid from 06/03/23 and ends 09/01/2024.      Member YO#[id number]  [id number]    [facility] sent

## 2023-09-18 DIAGNOSIS — J45.909 MILD ASTHMA WITHOUT COMPLICATION, UNSPECIFIED WHETHER PERSISTENT: Primary | ICD-10-CM

## 2023-09-19 RX ORDER — FLUTICASONE PROPIONATE AND SALMETEROL 250; 50 UG/1; UG/1
1 POWDER RESPIRATORY (INHALATION) EVERY 12 HOURS
Qty: 180 EACH | Refills: 3 | Status: SHIPPED | OUTPATIENT
Start: 2023-09-19

## 2023-09-19 NOTE — TELEPHONE ENCOUNTER
Refill passed per [facility], [facility] protocol. Requested Prescriptions   Pending Prescriptions Disp Refills    fluticasone-salmeterol (ADVAIR DISKUS) 250-50 MCG/ACT Inhalation Aerosol Powder, Breath Activated 180 each 3     Sig: Inhale 1 puff into the lungs Q12H.        Asthma & COPD Medication Protocol Passed - 9/18/2023  8:32 PM        Passed - In person appointment or virtual visit in the past 6 mos or appointment in next 3 mos     Recent Outpatient Visits              1 month ago Primary osteoarthritis of right knee    [facility], [facility]    Office Visit    4 months ago Adult general medical exam    dAiti Osborneu, [address] [place], [place] [address]Nichole MD    Office Visit    6 months ago Controlled type 2 diabetes mellitus without complication, without long-term current use of insulin [facility])    [facility], [address] [place] [address], [address] [place] [address] Karlo, APRN    Office Visit    6 months ago Primary osteoarthritis of right knee    [facility], [address], [place] Jose Nagel PA-C    Office Visit    9 months ago Diabetes mellitus type 2 without retinopathy [facility])    Nichloe Kwon MD    Office Visit          Future Appointments         Provider Department Appt Notes    In 1 month Cesar aGrcía MD [facility], [address] [place] [address]                           Recent Outpatient Visits              1 month ago Primary osteoarthritis of right knee    [facility], [facility] Heather Nagel [facility]-C    Office Visit    4 months ago Adult general medical exam    Nichole Gorman MD    Office Visit    6 months ago Controlled type 2 diabetes mellitus without complication, without long-term current use of insulin ([facility] [address] [facility], [facility], [place] Gregorio Razo, APRN    Office Visit    6 months ago Primary osteoarthritis of right knee    [address], [address], [place] Omelia Ditto, PA-C    Office Visit    9 months ago Diabetes mellitus type 2 without retinopathy [facility])    Rosalind Shaffer MD    Office Visit            Future Appointments         Provider Department Appt Notes    In 1 month Goran Crowe MD [address], [place]

## 2023-10-31 ENCOUNTER — OFFICE VISIT (OUTPATIENT)
Facility: CLINIC | Age: 63
End: 2023-10-31

## 2023-10-31 ENCOUNTER — TELEPHONE (OUTPATIENT)
Facility: CLINIC | Age: 63
End: 2023-10-31

## 2023-10-31 VITALS
SYSTOLIC BLOOD PRESSURE: 118 MMHG | BODY MASS INDEX: 28.52 KG/M2 | WEIGHT: 155 LBS | HEART RATE: 82 BPM | HEIGHT: 62 IN | DIASTOLIC BLOOD PRESSURE: 80 MMHG | OXYGEN SATURATION: 98 % | RESPIRATION RATE: 14 BRPM

## 2023-10-31 DIAGNOSIS — Z12.4 SCREENING FOR CERVICAL CANCER: ICD-10-CM

## 2023-10-31 DIAGNOSIS — E78.2 MIXED HYPERLIPIDEMIA: ICD-10-CM

## 2023-10-31 DIAGNOSIS — J45.909 MILD ASTHMA WITHOUT COMPLICATION, UNSPECIFIED WHETHER PERSISTENT: ICD-10-CM

## 2023-10-31 DIAGNOSIS — E11.9 DIABETES MELLITUS TYPE 2 WITHOUT RETINOPATHY (HCC): Primary | ICD-10-CM

## 2023-10-31 PROCEDURE — 3079F DIAST BP 80-89 MM HG: CPT | Performed by: INTERNAL MEDICINE

## 2023-10-31 PROCEDURE — 3008F BODY MASS INDEX DOCD: CPT | Performed by: INTERNAL MEDICINE

## 2023-10-31 PROCEDURE — 99214 OFFICE O/P EST MOD 30 MIN: CPT | Performed by: INTERNAL MEDICINE

## 2023-10-31 PROCEDURE — 3074F SYST BP LT 130 MM HG: CPT | Performed by: INTERNAL MEDICINE

## 2023-10-31 RX ORDER — DULAGLUTIDE 4.5 MG/.5ML
4.5 INJECTION, SOLUTION SUBCUTANEOUS WEEKLY
COMMUNITY
Start: 2023-10-23

## 2023-11-14 ENCOUNTER — TELEPHONE (OUTPATIENT)
Dept: FAMILY MEDICINE | Age: 63
End: 2023-11-14

## 2023-11-14 DIAGNOSIS — Z13.29 SCREENING FOR THYROID DISORDER: ICD-10-CM

## 2023-11-14 DIAGNOSIS — E11.9 TYPE 2 DIABETES MELLITUS WITHOUT COMPLICATION, WITHOUT LONG-TERM CURRENT USE OF INSULIN (CMD): Primary | ICD-10-CM

## 2023-11-14 DIAGNOSIS — E78.2 MIXED HYPERLIPIDEMIA: ICD-10-CM

## 2023-11-14 RX ORDER — ALBUTEROL SULFATE 90 UG/1
AEROSOL, METERED RESPIRATORY (INHALATION)
COMMUNITY
Start: 2023-10-01

## 2023-11-14 RX ORDER — BUDESONIDE AND FORMOTEROL FUMARATE DIHYDRATE 160; 4.5 UG/1; UG/1
AEROSOL RESPIRATORY (INHALATION)
COMMUNITY

## 2023-11-14 RX ORDER — SEMAGLUTIDE 2.68 MG/ML
2 INJECTION, SOLUTION SUBCUTANEOUS
COMMUNITY
Start: 2023-08-28

## 2023-11-14 RX ORDER — VALACYCLOVIR HYDROCHLORIDE 1 G/1
2000 TABLET, FILM COATED ORAL EVERY 12 HOURS
COMMUNITY
Start: 2023-11-03

## 2023-11-14 RX ORDER — DULAGLUTIDE 4.5 MG/.5ML
4.5 INJECTION, SOLUTION SUBCUTANEOUS
COMMUNITY
Start: 2023-10-23

## 2023-11-14 RX ORDER — ATORVASTATIN CALCIUM 40 MG/1
40 TABLET, FILM COATED ORAL DAILY
COMMUNITY
Start: 2023-09-19

## 2023-11-14 RX ORDER — FLUTICASONE PROPIONATE AND SALMETEROL 50; 250 UG/1; UG/1
1 POWDER RESPIRATORY (INHALATION) EVERY 12 HOURS
COMMUNITY
Start: 2023-09-19

## 2023-11-14 RX ORDER — MONTELUKAST SODIUM 10 MG/1
10 TABLET ORAL DAILY
COMMUNITY
Start: 2023-08-16

## 2023-12-20 ENCOUNTER — TELEPHONE (OUTPATIENT)
Dept: ENDOCRINOLOGY CLINIC | Facility: CLINIC | Age: 63
End: 2023-12-20

## 2023-12-20 NOTE — TELEPHONE ENCOUNTER
Spoke to patient - she stated she is currently taking ozempic 2mg weekly - will need refill in about 3 weeks    Patient stated she was at pharmacy and asked if she had any refills on file and that generated PA request for mounjaro - patient will wait until f/u to discuss with [facility] plan    F/U scheduled 2/7/24

## 2023-12-25 ENCOUNTER — HOSPITAL ENCOUNTER (OUTPATIENT)
Age: 63
Discharge: HOME | End: 2023-12-25
Payer: MEDICAID

## 2023-12-25 VITALS
RESPIRATION RATE: 18 BRPM | OXYGEN SATURATION: 100 % | DIASTOLIC BLOOD PRESSURE: 57 MMHG | HEART RATE: 89 BPM | TEMPERATURE: 98 F | SYSTOLIC BLOOD PRESSURE: 116 MMHG

## 2023-12-25 DIAGNOSIS — N30.01 ACUTE CYSTITIS WITH HEMATURIA: Primary | ICD-10-CM

## 2023-12-25 PROCEDURE — 99214 OFFICE O/P EST MOD 30 MIN: CPT

## 2023-12-25 PROCEDURE — 87086 URINE CULTURE/COLONY COUNT: CPT | Performed by: PHYSICIAN ASSISTANT

## 2023-12-25 RX ORDER — PHENAZOPYRIDINE HYDROCHLORIDE 200 MG/1
200 TABLET, FILM COATED ORAL 3 TIMES DAILY PRN
Qty: 6 TABLET | Refills: 0 | Status: SHIPPED | OUTPATIENT
Start: 2023-12-25 | End: 2024-01-01

## 2023-12-25 RX ORDER — NITROFURANTOIN 25; 75 MG/1; MG/1
100 CAPSULE ORAL 2 TIMES DAILY
Qty: 14 CAPSULE | Refills: 0 | Status: SHIPPED | OUTPATIENT
Start: 2023-12-25 | End: 2024-01-01

## 2023-12-25 NOTE — ED INITIAL ASSESSMENT (HPI)
Onset of hematuria this morning and lower abdominal pressure. Denies dysuria. No fever/chills. States she had some back pain last week that is now resolved.

## 2024-01-09 DIAGNOSIS — E11.9 DIABETES MELLITUS TYPE 2 WITHOUT RETINOPATHY (HCC): ICD-10-CM

## 2024-01-09 RX ORDER — SEMAGLUTIDE 2.68 MG/ML
2 INJECTION, SOLUTION SUBCUTANEOUS WEEKLY
Qty: 9 ML | Refills: 0 | Status: SHIPPED | OUTPATIENT
Start: 2024-01-09

## 2024-02-05 DIAGNOSIS — E11.9 DIABETES MELLITUS TYPE 2 WITHOUT RETINOPATHY (HCC): ICD-10-CM

## 2024-02-05 RX ORDER — SEMAGLUTIDE 2.68 MG/ML
2 INJECTION, SOLUTION SUBCUTANEOUS WEEKLY
Qty: 9 ML | Refills: 0 | Status: SHIPPED | OUTPATIENT
Start: 2024-02-05

## 2024-02-06 RX ORDER — VALACYCLOVIR HYDROCHLORIDE 1 G/1
2000 TABLET, FILM COATED ORAL EVERY 12 HOURS SCHEDULED
Qty: 12 TABLET | Refills: 3 | Status: SHIPPED | OUTPATIENT
Start: 2024-02-06

## 2024-02-06 RX ORDER — ALBUTEROL SULFATE 90 UG/1
AEROSOL, METERED RESPIRATORY (INHALATION)
Qty: 18 G | Refills: 2 | Status: SHIPPED | OUTPATIENT
Start: 2024-02-06

## 2024-02-06 NOTE — TELEPHONE ENCOUNTER
Please review.  Protocol failed / Has no protocol.     Requested Prescriptions   Pending Prescriptions Disp Refills    albuterol 108 (90 Base) MCG/ACT Inhalation Aero Soln 18 g 2     Sig: Inhale 1 puff into the lungs every six hours as needed       Asthma & COPD Medication Protocol Failed - 2/6/2024  9:30 AM        Failed - Asthma Action Score greater than or equal to 20        Failed - AAP/ACT given in last 12 months     No data recorded  No data recorded  No data recorded  No data recorded          Passed - Appointment in past 6 or next 3 months      Recent Outpatient Visits              3 months ago Diabetes mellitus type 2 without retinopathy (HCC)    [facility] Leo [facility] Los [facility]    Office Visit    6 months ago Primary osteoarthritis of right knee    [facility], [address] [place] [address] Yue Hodge PA-C    Office Visit    9 months ago Adult general medical exam    [facility] Leo De Los Santos MD    Office Visit    10 months ago Controlled type 2 diabetes mellitus without complication, without long-term current use of insulin (HCC)    [facility], [place]    Office Visit    11 months ago Primary osteoarthritis of right knee    [facility], [address], [place] Yue Hodge PA-C    Office Visit          Future Appointments         Provider Department Appt Notes    Tomorrow Imani Carmichael APRN [facility], [place] DM    In 2 months Leo De Los Santos MD [facility] 6 month follow up                  Future Appointments         Provider Department Appt Notes    Tomorrow Imani Carmichael APRN [facility], [place] DM    In 2 months Leo De Los Santos MD [facility],  [facility] 6 month follow up           Recent Outpatient Visits              3 months ago Diabetes mellitus type 2 without retinopathy ([facility])    [facility], [facility], Leo Salgado MD    Office Visit    6 months ago Primary osteoarthritis of right knee    [facility], [address] [place] [address] Yue Hodge PA-C    Office Visit    9 months ago Adult general medical exam    [facility], [facility], Leo Salgado MD    Office Visit    10 months ago Controlled type 2 diabetes mellitus without complication, without long-term current use of insulin ([facility])    [facility], [facility], Imani Gallegos APRN    Office Visit    11 months ago Primary osteoarthritis of right knee    [facility], [address], [place] Yue Hodge PA-C    Office Visit

## 2024-02-06 NOTE — TELEPHONE ENCOUNTER
Refill passed per [facility] protocol.    Requested Prescriptions   Pending Prescriptions Disp Refills    valACYclovir 1 G Oral Tab 4 tablet 3     Sig: Take 2 tablets (2,000 mg total) by mouth every 12 (twelve) hours.       Herpes Agent Protocol Passed - 2/6/2024 10:00 AM        Passed - In person appointment or virtual visit in the past 12 mos or appointment in next 3 mos     Recent Outpatient Visits              3 months ago Diabetes mellitus type 2 without retinopathy (HCC)    [facility]    Office Visit    6 months ago Primary osteoarthritis of right knee    [facility], [address] [place] [address] Yue Hodge PA-C    Office Visit    9 months ago Adult general medical exam    [facility] Leo De Los Santos MD    Office Visit    10 months ago Controlled type 2 diabetes mellitus without complication, without long-term current use of insulin (HCC)    [facility] Imani Carmichael APRN    Office Visit    11 months ago Primary osteoarthritis of right knee    [facility], [address], [place] Yue Hodge PA-C    Office Visit          Future Appointments         Provider Department Appt Notes    Tomorrow Imani Carmichael APRN [facility]    In 2 months [facility]                  Future Appointments         Provider Department Appt Notes    Tomorrow Imani Carmichael APRN [facility]    In 2 months Leo De Los Santos MD [facility] 6 month follow up          Recent Outpatient Visits              3 months ago Diabetes mellitus type 2 without retinopathy ([facility])    [facility], [facility], Leo Salgado MD    Office Visit    6 months ago Primary osteoarthritis of right knee    [facility], [address] [place] [address], [place]    Office Visit    9 months ago Adult general medical exam    [facility], [facility], Leo Salgado MD    Office Visit    10 months ago Controlled type 2 diabetes mellitus without complication, without long-term current use of insulin ([facility])    [facility], [facility], Imani Gallegos APRN    Office Visit    11 months ago Primary osteoarthritis of right knee    [facility], [address], [place] Yue Hodge PA-C    Office Visit

## 2024-02-07 ENCOUNTER — OFFICE VISIT (OUTPATIENT)
Dept: ENDOCRINOLOGY CLINIC | Facility: CLINIC | Age: 64
End: 2024-02-07

## 2024-02-07 VITALS
DIASTOLIC BLOOD PRESSURE: 74 MMHG | BODY MASS INDEX: 28.89 KG/M2 | WEIGHT: 157 LBS | SYSTOLIC BLOOD PRESSURE: 117 MMHG | HEIGHT: 62 IN | HEART RATE: 80 BPM

## 2024-02-07 DIAGNOSIS — E11.9 CONTROLLED TYPE 2 DIABETES MELLITUS WITHOUT COMPLICATION, WITHOUT LONG-TERM CURRENT USE OF INSULIN (HCC): Primary | ICD-10-CM

## 2024-02-07 LAB
CARTRIDGE LOT#: ABNORMAL NUMERIC
GLUCOSE BLOOD: 110
HEMOGLOBIN A1C: 6.2 % (ref 4.3–5.6)
TEST STRIP LOT #: NORMAL NUMERIC

## 2024-02-07 PROCEDURE — 99214 OFFICE O/P EST MOD 30 MIN: CPT

## 2024-02-07 PROCEDURE — 83036 HEMOGLOBIN GLYCOSYLATED A1C: CPT

## 2024-02-07 PROCEDURE — 82947 ASSAY GLUCOSE BLOOD QUANT: CPT

## 2024-02-07 RX ORDER — TIRZEPATIDE 10 MG/.5ML
10 INJECTION, SOLUTION SUBCUTANEOUS WEEKLY
Qty: 2 ML | Refills: 3 | Status: SHIPPED | OUTPATIENT
Start: 2024-02-07

## 2024-02-07 NOTE — PROGRESS NOTES
Name: Morenita Connell  Date: 2/7/24    Referring Physician: Leo De Los Santos    HISTORY OF PRESENT ILLNESS   Morenita Connell is a 61 year old female who presents for follow up for Diabetes mellitus type 2.     Since initial visit 2/2022 she has lost about 45 lbs and blood sugars have continued to improve, although since last visit her weight has been stagnant.     She was initially maintained on Ozempic and then changed to Mounjaro and was doing well. Insurance did change and Mounjaro was no longer covered. She was given a temporary one month supply of both medications. She had tried several formulary alternatives as required by insurance but now is back on Ozempic .    Diabetes History:  Diagnosed: 2019  Patient has not had hospitalizations for blood sugar issues     Family history of DM- Father, brother.     Prior glycohemoglobin were 10/2021-8.3%; 1/2022-8.5%; 7.5% 4/2022; 6.7% 9/2022; 6.3% 12/2022; 6.3% 3/2023; 6.2%  POC today     Dietary compliance: Good - is following low CHO diet.      Recall: using portioned plate     Breakfast- coffee typically   Lunch/dinner- 1/2 sandwich or salad , protein with vegetable   Snack- a few pieces of candy   Beverages-water, coffee, coke zero.    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 Was doing swimming three times weekly but has stopped during holidays.     Polyuria/polydipsia: No  Blurred vision: No    Episodes of hypoglycemia: Glucose of 65 in clinic today but she denies symptoms of hypoglycemia at home.     Blood Glucose:  Has not been checking recently     Previous DM therapies:  Jardiance- recurrent yeast infections  Trulicity - inadequate response  Rybelsus- inadequate response   Glipizide - hypoglycemia     Current DM Regimen:  Ozempic- 2mg subcutaneous weekly       Modifying factors:  Medication adherence: yes  Recent steroids, illness or infections: No    REVIEW OF SYSTEMS  Eyes: Diabetic  retinopathy present: No            Most recent visit to eye doctor in last 12 months: Yes- 3/2022 Dr. Tripathi - Was seen at [facility] 11/2023, but unsure of diabetes eye exam was done- she will forward reports.     CV: Cardiovascular disease present: No         Hypertension present: No         Hyperlipidemia present: Yes         Peripheral Vascular Disease present: No    : Nephropathy present: No    Neuro: Neuropathy present: No, denies symptoms     Skin: Infection or ulceration: No    Osteoporosis: No    Thyroid disease: No      Medications:     Current Outpatient Medications:     metFORMIN 500 MG Oral Tab, Take 1,000 mg by mouth at bedtime., Disp: , Rfl:     glyBURIDE 5 MG Oral Tab, Take 10 mg by mouth daily with breakfast., Disp: , Rfl:     albuterol (VENTOLIN HFA) 108 (90 Base) MCG/ACT Inhalation Aero Soln, Inhale 2 puffs into the lungs every 6 (six) hours as needed for Wheezing., Disp: 1 each, Rfl: 3    montelukast (SINGULAIR) 10 MG Oral Tab, Take 1 tablet (10 mg total) by mouth daily., Disp: 90 tablet, Rfl: 1    atorvastatin 40 MG Oral Tab, Take 1 tablet (40 mg total) by mouth daily., Disp: 90 tablet, Rfl: 1    Glucose Blood (TRUE METRIX BLOOD GLUCOSE TEST) In Vitro Strip, TEST BLOOD SUGAR ONCE A DAY, Disp: 100 strip, Rfl: 3    valACYclovir HCl 1 G Oral Tab, Take 2 tablets (2,000 mg total) by mouth every 12 (twelve) hours., Disp: 4 tablet, Rfl: 3    ONETOUCH VERIO FLEX SYSTEM w/Device Does not apply Kit, 1 each by Does not apply route daily., Disp: 1 kit, Rfl: 0    IRON, 65 mg by Does not apply route daily.  , Disp: , Rfl:      Allergies:     Aspirin                   Ibuprofen               WHEEZING    Comment:If received in large doses starts wheezing    Social History:   Social History    Socioeconomic History      Marital status:     Tobacco Use      Smoking status: Former Smoker        Packs/day: 1.00        Years: 12.00        Pack years: 12        Types: Cigarettes        Quit date:  2004        Years since quittin.4      Smokeless tobacco: Never Used    Vaping Use      Vaping Use: Never used    Substance and Sexual Activity      Alcohol use: Yes        Comment: socially      Drug use: No      Medical History:   Past Medical History:   Diagnosis Date    Arthritis     legs    Asthma     Stress induced    High blood cholesterol     Need for vaccination 2020    Osteoarthritis     Vaginal yeast infection 2020       Surgical history:   Past Surgical History:   Procedure Laterality Date           DELIVERY ONLY      x3    GASTRIC BYPASS,OBESITY,SB RECONSTRUC      HERNIA SURGERY      OTHER      gastric bypass    OTHER SURGICAL HISTORY      hernia repair/tummy tuck    OTHER SURGICAL HISTORY      hernia repair    TOTAL HIP REPLACEMENT Right 2016    TOTAL HIP REPLACEMENT Left 10/2016         PHYSICAL EXAM  Vitals:    24 0852   BP: 117/74   Pulse: 80       General Appearance:  alert, well developed, in no acute distress  Eyes:  normal conjunctivae, sclera, and normal pupils  Back: no kyphosis or back tenderness  Cardiovascular:  regular rate, rhythm, , no murmurs, S3 or S4  Musculoskeletal:  normal muscle strength and tone  Skin:  normal moisture and skin texture  Hair & Nails:  normal scalp hair     Hematologic:  no excessive bruising  Neuro:  sensory grossly intact and motor grossly intact.  Psychiatric:  oriented to time, self, and place  Nutritional:  no abnormal weight gain or loss      ASSESSMENT/PLAN:    Diabetes mellitus type 2 controlled.   -HgA1c- 6.2% POC today   -Congratulated patient on improved glycemic control.  - Reviewed pathogenesis of diabetes.   -Reviewed ABC's of diabetes  - Reviewed importance of good glycemic control to prevent microvascular and macrovascular complications including nephropathy, neuropathy, retinopathy, and cardiovascular disease.  - Reviewed importance of SBGM- check glucose  daily   - Reviewed target glucose goals for  patient  fasting and <180 post prandially   - Reviewed importance of following diabetic diet- recommended 135 grams of CHO per day or 45 grams per meal.   - Provided patient education materials    -Has been on Ozempic for several months and would like to transition to Mounjaro.      - Change Ozempic to Mounjaro 10mg subcutaneous weekly. Reviewed side effects and risks vs benefits of medications. Reviewed that this will likely require PA but she has tried and failed multiple alternatives.       -Normotensive  -UTD with optho- She will send report to confirm she had diabetes eye exam done and if not will have her see ophthalmology.   -normal foot exam 3/2023  -no nephropathy repeat labs prior to next visit.   -Repeat labs now.     Dyslipidemia  - lipids 3/2023-LDL- 54  -Continue Atorvastatin 40mg daily   -repeat labs before next visit- orders provided today    RTC in 4 months   2/7/24  [place]    A total of  30 minutes was spent on obtaining history, reviewing pertinent imaging/labs and specialists notes, evaluating patient, providing multiple treatment options, reinforcing diet/exercise and compliance, and completing documentation.

## 2024-02-09 ENCOUNTER — HOSPITAL ENCOUNTER (OUTPATIENT)
Age: 64
Discharge: HOME | End: 2024-02-09
Payer: MEDICAID

## 2024-02-09 VITALS
OXYGEN SATURATION: 99 % | HEART RATE: 93 BPM | DIASTOLIC BLOOD PRESSURE: 66 MMHG | SYSTOLIC BLOOD PRESSURE: 115 MMHG | TEMPERATURE: 98 F | RESPIRATION RATE: 16 BRPM

## 2024-02-09 DIAGNOSIS — T14.8XXA MUSCLE STRAIN: Primary | ICD-10-CM

## 2024-02-09 DIAGNOSIS — S33.5XXA LUMBAR SPRAIN, INITIAL ENCOUNTER: ICD-10-CM

## 2024-02-09 LAB
BILIRUB UR QL STRIP: NEGATIVE
CLARITY UR: CLEAR
COLOR UR: YELLOW
GLUCOSE UR STRIP-MCNC: NEGATIVE MG/DL
HGB UR QL STRIP: NEGATIVE
KETONES UR STRIP-MCNC: NEGATIVE MG/DL
LEUKOCYTE ESTERASE UR QL STRIP: NEGATIVE
NITRITE UR QL STRIP: NEGATIVE
PH UR STRIP: 6 [PH]
PROT UR STRIP-MCNC: NEGATIVE MG/DL
SP GR UR STRIP: 1.02
UROBILINOGEN UR STRIP-ACNC: 2 MG/DL

## 2024-02-09 PROCEDURE — 99212 OFFICE O/P EST SF 10 MIN: CPT

## 2024-02-09 PROCEDURE — 99214 OFFICE O/P EST MOD 30 MIN: CPT

## 2024-02-09 PROCEDURE — 81002 URINALYSIS NONAUTO W/O SCOPE: CPT

## 2024-02-09 NOTE — ED INITIAL ASSESSMENT (HPI)
Pt states she had a bladder infection in December and finished the abx but never followed up to see if the infection cleared. Pt states she now has some lower back pain and wants to make sure she doesn't have a uti/bladder infection.

## 2024-02-10 NOTE — ED PROVIDER NOTES
Patient Seen in: [facility]      History     Chief Complaint   Patient presents with    Urinary Symptoms     Stated Complaint: uti    Subjective:   HPI    63-year-old female presents with lower back pain that extends to the thoracic spine.  States she was here 1 month ago with UTI symptoms, \"every time I tell someone about my back pain they asked me if I have a urinary tract infection so I want to get checked out.\"      Denies any falls/redness/warmth/deformity/numbness/tingling/weakness sensation. No saddle anesthesia, red flag symptoms. No  hematuria/dysuria, vaginal pain/bleeding/discharge.     Objective:   Past Medical History:   Diagnosis Date    Arthritis     legs    Asthma     Stress induced    Diabetes (HCC)     High blood cholesterol     Need for vaccination 2020    Osteoarthritis     Vaginal yeast infection 2020              Past Surgical History:   Procedure Laterality Date           DELIVERY ONLY      x3    GASTRIC BYPASS,OBESITY,SB RECONSTRUC      HERNIA SURGERY      OTHER      gastric bypass    OTHER SURGICAL HISTORY      hernia repair/tummy tuck    OTHER SURGICAL HISTORY      hernia repair    TOTAL HIP REPLACEMENT Right 2016    TOTAL HIP REPLACEMENT Left 10/2016                Social History     Socioeconomic History    Marital status:    Tobacco Use    Smoking status: Former     Packs/day: 1.00     Years: 12.00     Additional pack years: 0.00     Total pack years: 12.00     Types: Cigarettes     Quit date: 2004     Years since quittin.4    Smokeless tobacco: Never   Vaping Use    Vaping Use: Never used   Substance and Sexual Activity    Alcohol use: Yes     Comment: socially    Drug use: No              Review of Systems    Positive for stated complaint: uti  Other systems are as noted in HPI.  Constitutional and vital signs reviewed.      All other systems reviewed and negative except as noted above.    Physical Exam     ED Triage Vitals  [02/09/24 1721]   /66   Pulse 93   Resp 16   Temp 97.7 °F (36.5 °C)   Temp src Temporal   SpO2 99 %   O2 Device None (Room air)       Current:/66   Pulse 93   Temp 97.7 °F (36.5 °C) (Temporal)   Resp 16   SpO2 99%         Physical Exam  Vitals and nursing note reviewed.   Constitutional:       General: She is not in acute distress.     Appearance: She is not toxic-appearing.   HENT:      Head: Normocephalic.      Nose: Nose normal. No congestion or rhinorrhea.      Mouth/Throat:      Mouth: Mucous membranes are moist.   Pulmonary:      Effort: Pulmonary effort is normal. No respiratory distress.   Abdominal:      General: Abdomen is flat.   Musculoskeletal:         General: Normal range of motion.      Cervical back: Normal range of motion.      Thoracic back: Tenderness present. No bony tenderness.      Lumbar back: Tenderness present. No bony tenderness.      Comments: No cva tenderness   Skin:     General: Skin is warm and dry.      Capillary Refill: Capillary refill takes less than 2 seconds.   Neurological:      General: No focal deficit present.      Mental Status: She is alert.               ED Course     Labs Reviewed   [facility] POCT URINALYSIS DIPSTICK - Abnormal; Notable for the following components:       Result Value    Urobilinogen urine 2.0 (*)     All other components within normal limits                      MDM                                         Medical Decision Making  62y/o female p/w mid to lower back pain concerned for UTI. U/a w/o infection, nor blood. +urobili but no abdominal complaints nor known liver/gallbladder disease.     Back pain is mostly musculoskeletal, related to work, posture.     Imp low back pain; no fevers, no infective findings, no trauma/fall, and no gait instability. No  focal bony TTP to remaining bony structures of spine. Do not feel requires labs nor imaging at  this time.  This patient has no weakness, numbness, saddle anesthesia, bowel or bladder  incontinence to  suggest cord compression.      -- Pain likely MSK Return precautions given, pt discharged in stable condition.  --pt denies need for stronger pain medication.     Physical exam remained stable over serial reexaminations as previously documented.      I have discussed with the patient the results of tests, differential diagnosis, and warning signs and symptoms that should prompt immediate return.  The patient understands these instructions and agrees to the follow-up plan provided.  There are no barriers to learning.   Appropriate f/u given.  Patient agrees to return for any concerns/problems/complications.            Disposition and Plan     Clinical Impression:  1. Muscle strain    2. Lumbar sprain, initial encounter         Disposition:  Discharge  2/9/2024  6:19 pm    Follow-up:  Leo De Los Santos MD  [address] [address]  [phone]                Medications Prescribed:  Discharge Medication List as of 2/9/2024  6:21 PM

## 2024-02-10 NOTE — DISCHARGE INSTRUCTIONS
Take medications as directed for pain relief.    -Careful Bending, Stooping , Twisting and Leaning. and Must be able to change positions from  sitting, to standing, to moving about as needed.    -Call your MD or Return to the emergency department immediately if:  -any numbness/tingling/weakness of lower extremities,  -any loss of bowel/bladder function  -high fever  -weight loss  -chest pain, shortness of breath, severe abdominal pain  -any worsening pain or symptoms  -or any other concerns

## 2024-02-15 ENCOUNTER — PATIENT MESSAGE (OUTPATIENT)
Dept: ENDOCRINOLOGY CLINIC | Facility: CLINIC | Age: 64
End: 2024-02-15

## 2024-02-17 NOTE — TELEPHONE ENCOUNTER
From: Morenita Connell  To: Imani Carmichael  Sent: 2/15/2024 10:56 PM CST  Subject: Preauthorization for mounjaro    Hi. Just checking the status

## 2024-02-22 NOTE — TELEPHONE ENCOUNTER
Received fax from [employer] [facility] dated on 02/21/24 stating the mounjaro 10mg/0.5ml solution pen-inj has been approved up to 4 pens per 28 days effective 01/01/24 and ends 02/21/25.  Mychart sent

## 2024-03-14 RX ORDER — ALBUTEROL SULFATE 90 UG/1
AEROSOL, METERED RESPIRATORY (INHALATION)
Qty: 18 G | Refills: 0 | OUTPATIENT
Start: 2024-03-14

## 2024-03-14 NOTE — TELEPHONE ENCOUNTER
Please review. Protocol failed / No Protocol.    Requested Prescriptions   Pending Prescriptions Disp Refills    ALBUTEROL 108 (90 Base) MCG/ACT Inhalation Aero Soln [Pharmacy Med Name: Albuterol Sulfate Hfa 108 Mcg/Act Aer Lupi] 18 g 0     Sig: Inhale 1 puff into the lungs every six hours as needed       Asthma & COPD Medication Protocol Failed - 3/13/2024  4:25 PM        Failed - Asthma Action Score greater than or equal to 20        Failed - AAP/ACT given in last 12 months     No data recorded  No data recorded  No data recorded  No data recorded          Passed - Appointment in past 6 or next 3 months      Recent Outpatient Visits              1 month ago Controlled type 2 diabetes mellitus without complication, without long-term current use of insulin ([facility])    [facility] Imani Carmichael APRN    Office Visit    4 months ago Diabetes mellitus type 2 without retinopathy ([facility])    [facility] Leo De Los Santos MD    Office Visit    7 months ago Primary osteoarthritis of right knee    [facility], [address] [place] [address] Yue Hodge PA-C    Office Visit    10 months ago Adult general medical exam    [facility] Leo De Los Santos MD    Office Visit    11 months ago Controlled type 2 diabetes mellitus without complication, without long-term current use of insulin ([facility])    [facility] Imani Carmichael APRN    Office Visit          Future Appointments         Provider Department Appt Notes    In 3 weeks Leo De Los Santos MD [facility] 6 month follow up

## 2024-04-01 ENCOUNTER — HOSPITAL ENCOUNTER (OUTPATIENT)
Age: 64
Discharge: HOME | End: 2024-04-01
Attending: EMERGENCY MEDICINE
Payer: MEDICAID

## 2024-04-01 VITALS
SYSTOLIC BLOOD PRESSURE: 144 MMHG | OXYGEN SATURATION: 100 % | DIASTOLIC BLOOD PRESSURE: 73 MMHG | RESPIRATION RATE: 18 BRPM | TEMPERATURE: 98 F | HEART RATE: 89 BPM

## 2024-04-01 DIAGNOSIS — N30.00 ACUTE CYSTITIS WITHOUT HEMATURIA: Primary | ICD-10-CM

## 2024-04-01 LAB
BILIRUB UR QL STRIP: NEGATIVE
COLOR UR: YELLOW
GLUCOSE UR STRIP-MCNC: NEGATIVE MG/DL
KETONES UR STRIP-MCNC: NEGATIVE MG/DL
NITRITE UR QL STRIP: POSITIVE
PH UR STRIP: 6.5 [PH]
PROT UR STRIP-MCNC: NEGATIVE MG/DL
SP GR UR STRIP: 1.02
UROBILINOGEN UR STRIP-ACNC: <2 MG/DL

## 2024-04-01 PROCEDURE — 99214 OFFICE O/P EST MOD 30 MIN: CPT

## 2024-04-01 PROCEDURE — 87088 URINE BACTERIA CULTURE: CPT | Performed by: EMERGENCY MEDICINE

## 2024-04-01 PROCEDURE — 81002 URINALYSIS NONAUTO W/O SCOPE: CPT

## 2024-04-01 PROCEDURE — 87186 SC STD MICRODIL/AGAR DIL: CPT | Performed by: EMERGENCY MEDICINE

## 2024-04-01 PROCEDURE — 87086 URINE CULTURE/COLONY COUNT: CPT | Performed by: EMERGENCY MEDICINE

## 2024-04-01 RX ORDER — NITROFURANTOIN 25; 75 MG/1; MG/1
100 CAPSULE ORAL 2 TIMES DAILY
Qty: 20 CAPSULE | Refills: 0 | Status: SHIPPED | OUTPATIENT
Start: 2024-04-01 | End: 2024-04-11

## 2024-04-01 RX ORDER — PHENAZOPYRIDINE HYDROCHLORIDE 100 MG/1
100 TABLET, FILM COATED ORAL 3 TIMES DAILY PRN
Qty: 6 TABLET | Refills: 0 | Status: SHIPPED | OUTPATIENT
Start: 2024-04-01 | End: 2024-04-03

## 2024-04-01 NOTE — ED INITIAL ASSESSMENT (HPI)
Patient reports dysuria, strong smelling urine since Thursday/Friday.  Denies fevers, chills.  Denies flank pain.

## 2024-04-01 NOTE — ED PROVIDER NOTES
Patient Seen in: [facility]      History     Chief Complaint   Patient presents with    Urinary Symptoms     Stated Complaint: UTI    Subjective:   HPI    This is a 64-year-old female with a history of 4 days of dysuria, lower abdominal pressure with symptoms consistent with urinary tract infection.  Patient reports she had a prior urinary tract infection December with similar symptoms.  She denies any gross hematuria, fever, nausea, vomiting or low back pain.  There are no known aggravating or alleviating factors.  Patient recently traveled from [place] via car and had to take ibuprofen to alleviate her discomfort.    Objective:   Past Medical History:   Diagnosis Date    Arthritis     legs    Asthma (HCC)     Stress induced    Diabetes (HCC)     High blood cholesterol     Need for vaccination 2020    Osteoarthritis     Vaginal yeast infection 2020              Past Surgical History:   Procedure Laterality Date           DELIVERY ONLY      x3    GASTRIC BYPASS,OBESITY,SB RECONSTRUC      HERNIA SURGERY      OTHER      gastric bypass    OTHER SURGICAL HISTORY      hernia repair/tummy tuck    OTHER SURGICAL HISTORY      hernia repair    TOTAL HIP REPLACEMENT Right 2016    TOTAL HIP REPLACEMENT Left 10/2016                No pertinent social history.            Review of Systems   Constitutional: Negative.    Respiratory: Negative.     Cardiovascular: Negative.    Gastrointestinal: Negative.    Genitourinary:  Positive for dysuria and urgency.   Skin: Negative.    Neurological: Negative.    All other systems reviewed and are negative.      Positive for stated complaint: UTI  Other systems are as noted in HPI.  Constitutional and vital signs reviewed.      All other systems reviewed and negative except as noted above.    Physical Exam     ED Triage Vitals [24 0932]   /73   Pulse 89   Resp 18   Temp 97.7 °F (36.5 °C)   Temp src    SpO2 100 %   O2 Device None (Room  air)       Current:/73   Pulse 89   Temp 97.7 °F (36.5 °C)   Resp 18   SpO2 100%         Physical Exam  Vitals reviewed.   Constitutional:       General: She is not in acute distress.     Appearance: Normal appearance. She is normal weight. She is not ill-appearing, toxic-appearing or diaphoretic.   HENT:      Head: Normocephalic and atraumatic.      Mouth/Throat:      Mouth: Mucous membranes are moist.   Cardiovascular:      Rate and Rhythm: Normal rate and regular rhythm.      Pulses: Normal pulses.      Heart sounds: Normal heart sounds.   Pulmonary:      Effort: Pulmonary effort is normal.      Breath sounds: Normal breath sounds.   Abdominal:      General: Abdomen is flat.      Palpations: Abdomen is soft.      Tenderness: There is abdominal tenderness.       Musculoskeletal:         General: Normal range of motion.      Cervical back: Normal range of motion.   Skin:     General: Skin is warm.   Neurological:      General: No focal deficit present.      Mental Status: She is alert and oriented to person, place, and time. Mental status is at baseline.   Psychiatric:         Mood and Affect: Mood normal.         Behavior: Behavior normal.         Thought Content: Thought content normal.         Judgment: Judgment normal.             ED Course     Labs Reviewed   [facility] POCT URINALYSIS DIPSTICK - Abnormal; Notable for the following components:       Result Value    Urine Clarity Cloudy (*)     Blood, Urine Trace-Intact (*)     Nitrite Urine Positive (*)     Leukocyte esterase urine Small (*)     All other components within normal limits                      MDM                                         Medical Decision Making  Medical decision making  Multiple diagnoses considered in the evaluation of this patient.  Vital signs reviewed and are stable. Differential diagnosis considered include, but not limited to:     UTI, cystitis  Patient's prior cultures and 2 prior visits for UTI were  reviewed.      Diagnosis: Cystitis secondary to UTI      Treatment Plan: Macrobid, Pyridium, follow-up with PCP      Amount and/or Complexity of Data Reviewed  Labs: ordered.        Disposition and Plan     Clinical Impression:  1. Acute cystitis without hematuria         Disposition:  Discharge  4/1/2024  9:52 am    Follow-up:  Leo De Los Santos MD  [address]  [facility] [address]  [phone]    In 1 week  As needed, If symptoms worsen          Medications Prescribed:  Current Discharge Medication List        START taking these medications    Details   nitrofurantoin monohydrate macro 100 MG Oral Cap Take 1 capsule (100 mg total) by mouth 2 (two) times daily for 10 days.  Qty: 20 capsule, Refills: 0      phenazopyridine 100 MG Oral Tab Take 1 tablet (100 mg total) by mouth 3 (three) times daily as needed for Pain.  Qty: 6 tablet, Refills: 0

## 2024-04-16 RX ORDER — NAPROXEN 500 MG/1
500 TABLET ORAL 2 TIMES DAILY WITH MEALS
Qty: 60 TABLET | Refills: 0 | Status: SHIPPED | OUTPATIENT
Start: 2024-04-16

## 2024-04-16 NOTE — TELEPHONE ENCOUNTER
Refill passed per [facility] protocol.    Please review last office visit 10/31/2023      last refill 02/05/2023    Is refill appropriate?     Requested Prescriptions   Pending Prescriptions Disp Refills    naproxen 500 MG Oral Tab 60 tablet 0     Sig: Take 1 tablet (500 mg total) by mouth 2 (two) times daily with meals.       Non-Narcotic Pain Medication Protocol Passed - 4/15/2024  3:57 PM        Passed - In person appointment or virtual visit in the past 6 mos or appointment in next 3 mos     Recent Outpatient Visits              2 months ago Controlled type 2 diabetes mellitus without complication, without long-term current use of insulin ([facility])    [facility] Imani Gallegos APRN    Office Visit    5 months ago Diabetes mellitus type 2 without retinopathy ([facility])    [facility] Leo De Los Santos MD    Office Visit    8 months ago Primary osteoarthritis of right knee    [facility], [address] [place] [address] Yue Hodge PA-C    Office Visit    11 months ago Adult general medical exam    [facility]    Office Visit    1 year ago Controlled type 2 diabetes mellitus without complication, without long-term current use of insulin ([facility]    [facility] Visit          Future Appointments         Provider Department Appt Notes    In 3 weeks Leo De Los Santos MD [facility] 6 month follow up                       Recent Outpatient Visits              2 months ago Controlled type 2 diabetes mellitus without complication, without long-term current use of insulin ([facility]    Office Visit    5 months ago Diabetes mellitus type 2  without retinopathy (HCC)    [facility], Leo Salgado MD    Office Visit    8 months ago Primary osteoarthritis of right knee    [facility], [address] [place] [address] Yue Hodge PA-C    Office Visit    11 months ago Adult general medical exam    [facility], Leo Salgado MD    Office Visit    1 year ago Controlled type 2 diabetes mellitus without complication, without long-term current use of insulin (HCC)    [facility], [place] APRN    Office Visit          Future Appointments         Provider Department Appt Notes    In 3 weeks [facility] MD [facility] month follow up

## 2024-04-30 ENCOUNTER — APPOINTMENT (OUTPATIENT)
Dept: FAMILY MEDICINE | Age: 64
End: 2024-04-30

## 2024-05-01 ENCOUNTER — HOSPITAL ENCOUNTER (OUTPATIENT)
Age: 64
Discharge: HOME | End: 2024-05-01
Payer: MEDICAID

## 2024-05-01 VITALS
HEART RATE: 85 BPM | OXYGEN SATURATION: 100 % | RESPIRATION RATE: 16 BRPM | DIASTOLIC BLOOD PRESSURE: 76 MMHG | SYSTOLIC BLOOD PRESSURE: 121 MMHG | TEMPERATURE: 97 F

## 2024-05-01 DIAGNOSIS — Z87.440 PERSONAL HISTORY UTI: ICD-10-CM

## 2024-05-01 DIAGNOSIS — R30.0 DYSURIA: Primary | ICD-10-CM

## 2024-05-01 LAB
BILIRUB UR QL STRIP: NEGATIVE
COLOR UR: YELLOW
GLUCOSE UR STRIP-MCNC: NEGATIVE MG/DL
HGB UR QL STRIP: NEGATIVE
KETONES UR STRIP-MCNC: NEGATIVE MG/DL
LEUKOCYTE ESTERASE UR QL STRIP: NEGATIVE
NITRITE UR QL STRIP: NEGATIVE
PH UR STRIP: 5.5 [PH]
PROT UR STRIP-MCNC: NEGATIVE MG/DL
SP GR UR STRIP: >=1.03
UROBILINOGEN UR STRIP-ACNC: <2 MG/DL

## 2024-05-01 PROCEDURE — 99213 OFFICE O/P EST LOW 20 MIN: CPT

## 2024-05-01 PROCEDURE — 87086 URINE CULTURE/COLONY COUNT: CPT | Performed by: PHYSICIAN ASSISTANT

## 2024-05-01 PROCEDURE — 99214 OFFICE O/P EST MOD 30 MIN: CPT

## 2024-05-01 PROCEDURE — 81002 URINALYSIS NONAUTO W/O SCOPE: CPT

## 2024-05-01 RX ORDER — PHENAZOPYRIDINE HYDROCHLORIDE 200 MG/1
200 TABLET, FILM COATED ORAL 3 TIMES DAILY PRN
Qty: 6 TABLET | Refills: 0 | Status: SHIPPED | OUTPATIENT
Start: 2024-05-01 | End: 2024-05-08

## 2024-05-01 NOTE — ED INITIAL ASSESSMENT (HPI)
Patient reports feeling ill since Thursday.  Reports lower abdominal aching and dysuria.  States she took keflex x 2 days at home.  Denies fevers.

## 2024-05-01 NOTE — ED PROVIDER NOTES
Patient Seen in: [facility]      History     Chief Complaint   Patient presents with    Urinary Symptoms     Stated Complaint: urinary issues    Subjective:   HPI    Patient is a 64-year-old -American female with past history of UTIs, hypertension, hyperlipidemia, controlled type 2 diabetes, gastric bypass, presenting to immediate care for concern for possible urinary tract infection.  Onset; 1 week. Associated: Bladder pain/pressure with associated with slight burning with urination.  Unsure of possible UTI.  Took previously prescribed Keflex for 2 days - 4 days ago without resolution of symptoms.  Here for her evaluation and urine testing.  She denies any fevers.  No nausea or vomiting.  No back, flank pain.  No urgency, frequency, hematuria.      Objective:   Past Medical History:    Arthritis    legs    Asthma (HCC)    Stress induced    Diabetes (HCC)    High blood cholesterol    Need for vaccination    Osteoarthritis    Vaginal yeast infection              Past Surgical History:   Procedure Laterality Date           delivery only      x3    Gastric bypass,obesity,sb reconstruc      Hernia surgery      Other      gastric bypass    Other surgical history      hernia repair/tummy tuck    Other surgical history      hernia repair    Total hip replacement Right 2016    Total hip replacement Left 10/2016                No pertinent social history.            Review of Systems   Constitutional:  Negative for chills and fever.   Gastrointestinal:  Negative for abdominal pain, diarrhea, nausea and vomiting.   Genitourinary:  Positive for dysuria. Negative for difficulty urinating, flank pain, frequency, hematuria, urgency, vaginal bleeding, vaginal discharge and vaginal pain.   Musculoskeletal:  Negative for back pain.   Skin:  Negative for rash.   Allergic/Immunologic: Negative for immunocompromised state.   Neurological:  Negative for dizziness, weakness, light-headedness,  numbness and headaches.   Psychiatric/Behavioral:  Negative for confusion.    All other systems reviewed and are negative.      Positive for stated complaint: urinary issues  Other systems are as noted in HPI.  Constitutional and vital signs reviewed.      All other systems reviewed and negative except as noted above.    Physical Exam     ED Triage Vitals [05/01/24 1017]   /76   Pulse 85   Resp 16   Temp 97.1 °F (36.2 °C)   Temp src Temporal   SpO2 100 %   O2 Device None (Room air)       Current:/76   Pulse 85   Temp 97.1 °F (36.2 °C) (Temporal)   Resp 16   SpO2 100%         Physical Exam  Vitals and nursing note reviewed.   Constitutional:       General: She is not in acute distress.     Appearance: Normal appearance. She is not ill-appearing, toxic-appearing or diaphoretic.   HENT:      Head: Normocephalic and atraumatic.      Mouth/Throat:      Mouth: Mucous membranes are moist.   Eyes:      General: No scleral icterus.     Conjunctiva/sclera: Conjunctivae normal.   Pulmonary:      Effort: No respiratory distress.   Abdominal:      General: There is no distension.      Palpations: Abdomen is soft.      Tenderness: There is no abdominal tenderness.   Musculoskeletal:         General: No deformity. Normal range of motion.   Neurological:      General: No focal deficit present.      Mental Status: She is alert and oriented to person, place, and time.      Motor: No weakness.      Gait: Gait normal.   Psychiatric:         Mood and Affect: Mood normal.         Behavior: Behavior normal.             ED Course     Labs Reviewed   [facility] POCT URINALYSIS DIPSTICK - Abnormal; Notable for the following components:       Result Value    Urine Clarity Cloudy (*)     All other components within normal limits   URINE CULTURE, ROUTINE     Results for orders placed or performed during the hospital encounter of 05/01/24   POCT Urinalysis Dipstick    Collection Time: 05/01/24 10:24 AM   Result Value Ref Range    Urine  Color Yellow Yellow    Urine Clarity Cloudy (A) Clear    Specific Gravity, Urine >=1.030 1.005 - 1.030    PH, Urine 5.5 5.0 - 8.0    Protein urine Negative Negative mg/dL    Glucose, Urine Negative Negative mg/dL    Ketone, Urine Negative Negative mg/dL    Bilirubin, Urine Negative Negative    Blood, Urine Negative Negative    Nitrite Urine Negative Negative    Urobilinogen urine <2.0 <2.0 mg/dL    Leukocyte esterase urine Negative Negative          MDM     Patient is a 64-year-old female, presenting to immediate care for evaluation of bladder pressure with associated burning with urination for the last week.  Self treated with previously prescribed oral Keflex for 2 days, 4 days ago without improvement.  Symptoms are mild.  Ongoing.  Here for evaluation of possible urinary tract infection.  She denies any fevers or chills or myalgias.  No systemic symptoms.  No nausea or vomiting.  No back or flank pain.  No urinary urgency, frequency, hematuria, vaginal bleeding/discharge.  Urine dip negative for negative nitrates and leukocyte esterase.  Negative for ketones and glucose and protein.  Urine cloudy.  Mary Beth decision making.  Will send for urine culture and wait for formal results regarding possible treatment.  This time increase oral water intake.  Pyridium for dysuria.  Return precautions.  Urine culture pending.  Treat if positive.  Past review urine culture sensitive to Macrobid, Keflex, Bactrim, etc.                     Medical Decision Making      Disposition and Plan     Clinical Impression:  1. Dysuria    2. Personal history UTI         Disposition:  Discharge  5/1/2024 10:41 am    Follow-up:  No follow-up provider specified.        Medications Prescribed:  Discharge Medication List as of 5/1/2024 10:44 AM

## 2024-05-09 NOTE — TELEPHONE ENCOUNTER
Please review. Protocol Failed; No Protocol  Future Appointments   Date Time Provider Department Center   5/20/2024  4:45 PM Leo De Los Santos MD [facility]      Requested Prescriptions   Pending Prescriptions Disp Refills    albuterol 108 (90 Base) MCG/ACT Inhalation Aero Soln 18 g 2     Sig: Inhale 1 puff into the lungs every six hours as needed       Asthma & COPD Medication Protocol Failed - 5/7/2024 12:12 PM        Failed - Asthma Action Score greater than or equal to 20        Failed - AAP/ACT given in last 12 months     No data recorded  No data recorded  No data recorded  No data recorded          Passed - Appointment in past 6 or next 3 months      Recent Outpatient Visits              3 months ago Controlled type 2 diabetes mellitus without complication, without long-term current use of insulin ([facility])    [facility] Imani Carmichael APRN    Office Visit    6 months ago Diabetes mellitus type 2 without retinopathy ([facility])    [facility] Leo De Los Santos MD    Office Visit    9 months ago Primary osteoarthritis of right knee    [facility], [address] [place] [address] Yue Hodge PA-C    Office Visit    1 year ago Adult general medical exam    [facility] [place] Leo De Los Santos MD    Office Visit    1 year ago Controlled type 2 diabetes mellitus without complication, without long-term current use of insulin ([facility])    [facility] Imani Carmichael APRN    Office Visit          Future Appointments         Provider Department Appt Notes    In 1 week Leo De Los Santos MD [facility] 6 month follow up                           Future Appointments         Provider Department Appt Notes    In 1 week Leo De Los Santos MD [facility] 6 month follow up          Recent Outpatient Visits              3 months ago Controlled type 2 diabetes mellitus without complication, without long-term current use of insulin ([facility])    [facility], [facility], Imani Gallegos APRN    Office Visit    6 months ago Diabetes mellitus type 2 without retinopathy ([facility])    [facility], [facility], Leo Salgado MD    Office Visit    9 months ago Primary osteoarthritis of right knee    [facility], [address] [place] [address] Yue Hodge PA-C    Office Visit    1 year ago Adult general medical exam    [facility], [facility], Leo Salgado MD    Office Visit    1 year ago Controlled type 2 diabetes mellitus without complication, without long-term current use of insulin ([facility])    [facility], [facility], Imani Gallegos APRN    Office Visit

## 2024-05-10 RX ORDER — ALBUTEROL SULFATE 90 UG/1
AEROSOL, METERED RESPIRATORY (INHALATION)
Qty: 18 G | Refills: 2 | Status: SHIPPED | OUTPATIENT
Start: 2024-05-10

## 2024-05-12 RX ORDER — NAPROXEN 500 MG/1
500 TABLET ORAL 2 TIMES DAILY WITH MEALS
Qty: 60 TABLET | Refills: 0 | Status: SHIPPED | OUTPATIENT
Start: 2024-05-12

## 2024-05-12 NOTE — TELEPHONE ENCOUNTER
Refill passed per [facility] protocol.     Future Appointments   Date Time Provider Department Center   5/20/2024  4:45 PM Leo De Los Santos MD [facility]         Requested Prescriptions   Pending Prescriptions Disp Refills    NAPROXEN 500 MG Oral Tab [Pharmacy Med Name: Naproxen 500 Mg Tab Trevon] 60 tablet 0     Sig: Take 1 tablet by mouth twice daily with meals.       Non-Narcotic Pain Medication Protocol Passed - 5/10/2024 10:40 AM        Passed - In person appointment or virtual visit in the past 6 mos or appointment in next 3 mos     Recent Outpatient Visits              3 months ago Controlled type 2 diabetes mellitus without complication, without long-term current use of insulin ([facility])    [facility] Imani Carmichael APRN    Office Visit    6 months ago Diabetes mellitus type 2 without retinopathy ([facility])    [facility] Leo De Los Santos MD    Office Visit    9 months ago Primary osteoarthritis of right knee    [facility], [address] [place] [address] Yue Hodge PA-C    Office Visit    1 year ago Adult general medical exam    [facility] [place] Leo De Los Santos MD    Office Visit    1 year ago Controlled type 2 diabetes mellitus without complication, without long-term current use of insulin ([facility]    [facility] Visit          Future Appointments         Provider Department Appt Notes    In 1 week Leo De Los Santos MD [facility] 6 month follow up

## 2024-05-13 ENCOUNTER — PATIENT MESSAGE (OUTPATIENT)
Dept: ENDOCRINOLOGY CLINIC | Facility: CLINIC | Age: 64
End: 2024-05-13

## 2024-05-15 RX ORDER — TIRZEPATIDE 12.5 MG/.5ML
12.5 INJECTION, SOLUTION SUBCUTANEOUS WEEKLY
Qty: 2 ML | Refills: 3 | Status: SHIPPED | OUTPATIENT
Start: 2024-05-15

## 2024-05-15 NOTE — TELEPHONE ENCOUNTER
From: Morenita Connell  To: Imani Maria Elenarafael Carmichael  Sent: 5/13/2024 11:35 AM CDT  Subject: Mounjaro     I am currently taking the 10mg dosage of Mounjaro. I have not experienced much weight loss. I have actually gained 8 pounds since reaching my lowest weight. Would you suggest that we increase the dosage? Also the 10mg dosage has been on back order of 3-4 weeks so if have had gaps in my scheduled intake.

## 2024-05-20 ENCOUNTER — OFFICE VISIT (OUTPATIENT)
Dept: INTERNAL MEDICINE CLINIC | Facility: CLINIC | Age: 64
End: 2024-05-20

## 2024-05-20 VITALS
BODY MASS INDEX: 29.35 KG/M2 | OXYGEN SATURATION: 99 % | SYSTOLIC BLOOD PRESSURE: 112 MMHG | WEIGHT: 159.5 LBS | HEART RATE: 84 BPM | DIASTOLIC BLOOD PRESSURE: 70 MMHG | HEIGHT: 62 IN

## 2024-05-20 DIAGNOSIS — Z00.00 ADULT GENERAL MEDICAL EXAM: ICD-10-CM

## 2024-05-20 DIAGNOSIS — E11.9 DIABETES MELLITUS TYPE 2 WITHOUT RETINOPATHY (HCC): Primary | ICD-10-CM

## 2024-05-20 DIAGNOSIS — Z12.31 BREAST CANCER SCREENING BY MAMMOGRAM: ICD-10-CM

## 2024-05-20 DIAGNOSIS — E78.2 MIXED HYPERLIPIDEMIA: ICD-10-CM

## 2024-05-20 DIAGNOSIS — Z12.4 SCREENING FOR CERVICAL CANCER: ICD-10-CM

## 2024-05-20 NOTE — PROGRESS NOTES
Morenita Connell is a 64 year old female.  Chief Complaint   Patient presents with    Follow - Up     Diabetes follow up.      HPI:   64-year-old pleasant lady here for follow-up.  Taking medicines regularly.  Denies any chest pain, abdominal pain, nausea vomiting.  No hypoglycemic events.      Current Outpatient Medications   Medication Sig Dispense Refill    Tirzepatide (MOUNJARO) 12.5 MG/0.5ML Subcutaneous Solution Pen-injector Inject 12.5 mg into the skin once a week. 2 mL 3    naproxen 500 MG Oral Tab Take 1 tablet (500 mg total) by mouth 2 (two) times daily with meals. 60 tablet 0    albuterol 108 (90 Base) MCG/ACT Inhalation Aero Soln Inhale 1 puff into the lungs every six hours as needed 18 g 2    valACYclovir 1 G Oral Tab Take 2 tablets (2,000 mg total) by mouth every 12 (twelve) hours. 12 tablet 3    fluticasone-salmeterol (ADVAIR DISKUS) 250-50 MCG/ACT Inhalation Aerosol Powder, Breath Activated Inhale 1 puff into the lungs Q12H. 180 each 3    clotrimazole (3 DAY VAGINAL) 2 % Vaginal Cream Apply once when needed 21 g 0    montelukast 10 MG Oral Tab Take 1 tablet (10 mg total) by mouth daily. 90 tablet 3    atorvastatin 40 MG Oral Tab Take 1 tablet (40 mg total) by mouth daily. 90 tablet 3    vitamin E 400 UNITS Oral Cap Take 1 capsule (400 Units total) by mouth daily.      Blood Glucose Monitoring Suppl (TRUE METRIX METER) Does not apply Device 1 Device daily. 1 each 0    IRON 65 mg by Does not apply route daily.          Past Medical History:    Arthritis    legs    Asthma (HCC)    Stress induced    Diabetes (HCC)    High blood cholesterol    Need for vaccination    Osteoarthritis    Vaginal yeast infection      Past Surgical History:   Procedure Laterality Date           delivery only      x3    Gastric bypass,obesity,sb reconstruc      Hernia surgery      Other      gastric bypass    Other surgical history      hernia repair/tummy tuck    Other surgical history      hernia repair     Total hip replacement Right 2016    Total hip replacement Left 10/2016      Social History:  Social History     Socioeconomic History    Marital status:    Tobacco Use    Smoking status: Former     Current packs/day: 0.00     Average packs/day: 1 pack/day for 12.0 years (12.0 ttl pk-yrs)     Types: Cigarettes     Start date: 1992     Quit date: 2004     Years since quittin.6    Smokeless tobacco: Never   Vaping Use    Vaping status: Never Used   Substance and Sexual Activity    Alcohol use: Yes     Comment: socially    Drug use: No      Family History   Problem Relation Age of Onset    Diabetes Father     Hypertension Father     Obesity Father     Obesity Mother     Obesity Sister     Diabetes Brother     Heart Disorder Brother         CABG x 3    Other (copd) Brother     Obesity Sister     Breast Cancer Paternal Cousin Female     Macular degeneration Neg     Glaucoma Neg       Allergies   Allergen Reactions    Aspirin     Ibuprofen WHEEZING     If received in large doses starts wheezing        REVIEW OF SYSTEMS:   Review of Systems   Review of Systems   Constitutional: Negative for activity change, appetite change and fever.   HENT: Negative for congestion and voice change.    Respiratory: Negative for cough and shortness of breath.    Cardiovascular: Negative for chest pain.   Gastrointestinal: Negative for abdominal distention, abdominal pain and vomiting.   Genitourinary: Negative for hematuria.   Skin: Negative for wound.   Psychiatric/Behavioral: Negative for behavioral problems.   Wt Readings from Last 5 Encounters:   24 159 lb 8 oz (72.3 kg)   24 157 lb (71.2 kg)   10/31/23 155 lb (70.3 kg)   23 162 lb (73.5 kg)   23 162 lb (73.5 kg)     Body mass index is 29.17 kg/m².      EXAM:   /70 (BP Location: Left arm, Patient Position: Sitting)   Pulse 84   Ht 5' 2\" (1.575 m)   Wt 159 lb 8 oz (72.3 kg)   SpO2 99%   BMI 29.17 kg/m²   Physical Exam    Constitutional:       Appearance: Normal appearance.   HENT:      Head: Normocephalic.   Eyes:      Conjunctiva/sclera: Conjunctivae normal.   Cardiovascular:      Rate and Rhythm: Normal rate and regular rhythm.      Heart sounds: Normal heart sounds. No murmur heard.  Pulmonary:      Effort: Pulmonary effort is normal.      Breath sounds: Normal breath sounds. No rhonchi or rales.   Abdominal:      General: Bowel sounds are normal.      Palpations: Abdomen is soft.      Tenderness: There is no abdominal tenderness.   Musculoskeletal:      Cervical back: Neck supple.      Right lower leg: No edema.      Left lower leg: No edema.   Skin:     General: Skin is warm and dry.   Neurological:      General: No focal deficit present.      Mental Status: He is alert and oriented to person, place, and time. Mental status is at baseline.   Psychiatric:         Mood and Affect: Mood normal.         Behavior: Behavior normal.   Bilateral barefoot skin diabetic exam is normal, visualized feet and the appearance is normal.  Bilateral monofilament/sensation of both feet is normal.  Pulsation pedal pulse exam of both lower legs/feet is normal as well.       ASSESSMENT AND PLAN:   1. Diabetes mellitus type 2 without retinopathy (HCC)  Lab Results   Component Value Date    A1C 6.2 (A) 02/07/2024    LDL 54 03/22/2023    MICROALBCREA 6.6 03/22/2023       Excellent Hemoglobin A1c.  Continue statins.  Up-to-date on eye exam and foot exam.    - CBC, Platelet; No Differential; Future  - Comp Metabolic Panel (14); Future  - Lipid Panel; Future  - TSH W Reflex To Free T4; Future  - Microalb/Creat Ratio, Random Urine; Future    2. Mixed hyperlipidemia  Continue statins.  Lipid profile and LFT ordered.  - CBC, Platelet; No Differential; Future  - Comp Metabolic Panel (14); Future  - Lipid Panel; Future  - TSH W Reflex To Free T4; Future  - Microalb/Creat Ratio, Random Urine; Future    3. Adult general medical exam  Blood works ordered.    4.  Breast cancer screening by mammogram  Mammography ordered.  - [facility] CORI 2D+3D SCREENING BILAT (CPT=77067/09827); Future    5. Screening for cervical cancer  Referral given for Pap smear.  - OBG Referral - In Network    Plan: Labs ordered.  Referral given.  Mammogram ordered.  I will see her back in 4 months.      The patient indicates understanding of these issues and agrees to the plan.  No follow-ups on file.    This note was prepared using Dragon [employer] voice recognition dictation software. As a result errors may occur. When identified these errors have been corrected. While every attempt is made to correct errors during dictation discrepancies may still exist.

## 2024-06-30 ENCOUNTER — HOSPITAL ENCOUNTER (OUTPATIENT)
Age: 64
Discharge: HOME | End: 2024-06-30
Attending: STUDENT IN AN ORGANIZED HEALTH CARE EDUCATION/TRAINING PROGRAM
Payer: MEDICAID

## 2024-06-30 VITALS
SYSTOLIC BLOOD PRESSURE: 113 MMHG | DIASTOLIC BLOOD PRESSURE: 72 MMHG | OXYGEN SATURATION: 100 % | HEART RATE: 86 BPM | RESPIRATION RATE: 18 BRPM | TEMPERATURE: 98 F

## 2024-06-30 DIAGNOSIS — N30.00 ACUTE CYSTITIS WITHOUT HEMATURIA: Primary | ICD-10-CM

## 2024-06-30 LAB
BILIRUB UR QL STRIP: NEGATIVE
CLARITY UR: CLEAR
COLOR UR: YELLOW
GLUCOSE UR STRIP-MCNC: NEGATIVE MG/DL
HGB UR QL STRIP: NEGATIVE
KETONES UR STRIP-MCNC: NEGATIVE MG/DL
NITRITE UR QL STRIP: POSITIVE
PH UR STRIP: 5.5 [PH]
PROT UR STRIP-MCNC: NEGATIVE MG/DL
SP GR UR STRIP: 1.02
UROBILINOGEN UR STRIP-ACNC: <2 MG/DL

## 2024-06-30 PROCEDURE — 87186 SC STD MICRODIL/AGAR DIL: CPT | Performed by: STUDENT IN AN ORGANIZED HEALTH CARE EDUCATION/TRAINING PROGRAM

## 2024-06-30 PROCEDURE — 87077 CULTURE AEROBIC IDENTIFY: CPT | Performed by: STUDENT IN AN ORGANIZED HEALTH CARE EDUCATION/TRAINING PROGRAM

## 2024-06-30 PROCEDURE — 87086 URINE CULTURE/COLONY COUNT: CPT | Performed by: STUDENT IN AN ORGANIZED HEALTH CARE EDUCATION/TRAINING PROGRAM

## 2024-06-30 PROCEDURE — 99214 OFFICE O/P EST MOD 30 MIN: CPT

## 2024-06-30 PROCEDURE — 81002 URINALYSIS NONAUTO W/O SCOPE: CPT

## 2024-06-30 RX ORDER — FLUCONAZOLE 150 MG/1
150 TABLET ORAL ONCE
Qty: 1 TABLET | Refills: 0 | Status: SHIPPED | OUTPATIENT
Start: 2024-06-30 | End: 2024-06-30

## 2024-06-30 RX ORDER — CEPHALEXIN 500 MG/1
500 CAPSULE ORAL 2 TIMES DAILY
Qty: 14 CAPSULE | Refills: 0 | Status: SHIPPED | OUTPATIENT
Start: 2024-06-30 | End: 2024-07-07

## 2024-06-30 NOTE — ED INITIAL ASSESSMENT (HPI)
Patient states she has had foul smelling urine along with pressure in her bladder since Monday. Patient also with some burning and itching sensation in her outer labia. Patient denies any vaginal discharge.

## 2024-06-30 NOTE — ED PROVIDER NOTES
Patient Seen in: [facility]      History     Chief Complaint   Patient presents with    Urinary Symptoms     Stated Complaint: Urine smell, itchy    Subjective:   HPI    64-year-old female with past medical history of diabetes on Ozempic, who presents with concern for foul-smelling urine for 6 days, but this morning noticed burning with urination and suprapubic pressure, denies back pain or fevers or vomiting.  She states she has recurrent UTIs and has previously tolerated Keflex as well as Macrobid.  She denies any antibiotic allergies.  Per chart review of patient's most recent urine culture from 5/2/2024 there was no growth, but on urine culture from 8/24/2023 she grew E. coli which was resistant to ampicillin but sensitive to cefazolin.  She also request Diflucan as she frequently has yeast infections following antibiotic usage.    Objective:   Past Medical History:    Arthritis    legs    Asthma (HCC)    Stress induced    Diabetes (HCC)    High blood cholesterol    Need for vaccination    Osteoarthritis    Vaginal yeast infection              No pertinent past surgical history.              No pertinent social history.            Review of Systems    Positive for stated Chief Complaint: Urinary Symptoms    Other systems are as noted in HPI.  Constitutional and vital signs reviewed.      All other systems reviewed and negative except as noted above.    Physical Exam     ED Triage Vitals [06/30/24 1011]   /72   Pulse 86   Resp 18   Temp 97.5 °F (36.4 °C)   Temp src Temporal   SpO2 100 %   O2 Device None (Room air)       Current Vitals:   Vital Signs  BP: 113/72  Pulse: 86  Resp: 18  Temp: 97.5 °F (36.4 °C)  Temp src: Temporal    Oxygen Therapy  SpO2: 100 %  O2 Device: None (Room air)            Physical Exam    General: Awake, alert, comfortable on room air, in no distress, tolerating oral secretions, interactive  Pulmonary: Lungs CTA B, no wheezing, no conversational dyspnea  GI: Abdomen  soft, nondistended, very mild suprapubic pressure/irritation reported on palpation of this region  Neuro: symmetrical facial expressions on gross observation  Musculoskeletal: No B/L CVA tenderness  HEENT: no periorbital edema or erythema  Psych: Normal mood, normal affect    ED Course     Labs Reviewed   [facility] POCT URINALYSIS DIPSTICK - Abnormal; Notable for the following components:       Result Value    Nitrite Urine Positive (*)     Leukocyte esterase urine Trace (*)     All other components within normal limits   URINE CULTURE, ROUTINE       MDM   Patient is awake, alert, comfortable on room air, in no distress, abdomen is soft with mild suprapubic tenderness with no rebound and no guarding, no CVA tenderness and patient afebrile with no suspicion for pyelonephritis or septic nephrolithiasis at this time, patient's exam and urinalysis are consistent with cystitis  -Antibiotics prescribed to treat for cystitis, patient denies any antibiotic allergies. Per chart review of patient's most recent urine culture from 5/2/2024 there was no growth, but on urine culture from 8/24/2023 she grew E. coli which was resistant to ampicillin but sensitive to cefazolin.  She reports that she has previously tolerated Keflex which has previously been effective for her UTIs.  Will prescribe Keflex.  -Urine culture sent.  -We discussed that symptoms should begin to improve within 72 hours on antibiotics, if there is no improvement or patient notices any new, changing, or progressing signs or symptoms, they should present immediately to their primary care physician for reassessment  -We discussed that even in the setting of antibiotics, infections can continue to progress.  If patient notices any back pain, abdominal pain, fevers, vomiting, lethargy, dehydration, or any other concerning signs or symptoms they should present immediately to the emergency department for assessment.   -We discussed different etiologies for recurrent UTIs,  and did discuss that if she continues to experience UTIs she may benefit from urology referral and to discuss this with her primary care physician.    -Patient also requests Diflucan as she frequently develops vaginal yeast infections following antibiotic prescriptions.  She will use this if she develops signs or symptoms of vaginal candidiasis.  She has previously tolerated this medication.  No apparent contraindications/interactions with her medications.  One-time dose of oral Diflucan prescribed in the event patient does develop vaginal candidiasis 2/2 antibiotic usage.    Medical Decision Making  Amount and/or Complexity of Data Reviewed  External Data Reviewed: labs.     Details: Previous urine cultures as above  Labs: ordered.    Risk  Prescription drug management.        Disposition and Plan     Clinical Impression:  1. Acute cystitis without hematuria         Disposition:  Discharge  6/30/2024 10:45 am    Follow-up:  Mynor Sommers MD  [address] [place] [id number]  [phone]    In 1 week  Urolgoy for recurrent UTI          Medications Prescribed:  Discharge Medication List as of 6/30/2024 10:50 AM        START taking these medications    Details   cephalexin 500 MG Oral Cap Take 1 capsule (500 mg total) by mouth 2 (two) times daily for 7 days., Normal, Disp-14 capsule, R-0      fluconazole (DIFLUCAN) 150 MG Oral Tab Take 1 tablet (150 mg total) by mouth once for 1 dose., Normal, Disp-1 tablet, R-0

## 2024-06-30 NOTE — DISCHARGE INSTRUCTIONS
Your story and urine sample are consistent with a urinary tract infection for which I have prescribed antibiotics.  Symptoms should begin to improve within 72 hours on antibiotics, if there is no improvement, or if you develop any new, changing, or progressing signs or symptoms, please follow-up immediately with your primary care physician.    Even in the setting of antibiotics, infections can continue to progress.  If you notice any back pain, abdominal pain, fevers, vomiting, lethargy, dehydration, or any other concerns, please present immediately to the emergency department for assessment.     You requested a one-time dose of Diflucan to help treat for potential development of yeast infection as you frequently encounter yeast infections due to antibiotics.  Diflucan was prescribed to treat for yeast infection if this develops.

## 2024-07-08 ENCOUNTER — TELEPHONE (OUTPATIENT)
Dept: INTERNAL MEDICINE CLINIC | Facility: CLINIC | Age: 64
End: 2024-07-08

## 2024-07-08 ENCOUNTER — TELEMEDICINE (OUTPATIENT)
Dept: INTERNAL MEDICINE CLINIC | Facility: CLINIC | Age: 64
End: 2024-07-08

## 2024-07-08 DIAGNOSIS — N30.00 ACUTE CYSTITIS WITHOUT HEMATURIA: Primary | ICD-10-CM

## 2024-07-08 RX ORDER — SULFAMETHOXAZOLE AND TRIMETHOPRIM 800; 160 MG/1; MG/1
1 TABLET ORAL 2 TIMES DAILY
Qty: 10 TABLET | Refills: 0 | Status: SHIPPED | OUTPATIENT
Start: 2024-07-08 | End: 2024-07-13

## 2024-07-08 RX ORDER — PHENAZOPYRIDINE HYDROCHLORIDE 100 MG/1
100 TABLET, FILM COATED ORAL 2 TIMES DAILY PRN
Qty: 10 TABLET | Refills: 0 | Status: SHIPPED | OUTPATIENT
Start: 2024-07-08 | End: 2024-07-13

## 2024-07-08 NOTE — TELEPHONE ENCOUNTER
Servando rasmussen:  Patient called (identified name and ),   [facility], diagnosed with UTI.  Just finished cephalexin 500 mg twice daily for 7 days.  Woke up this morning and urine has \"putrid\" strong odor, and sense of lower pelvic pressure and slight irritation.  Patient asking if Dr De Los Santos can prescribe different antibiotic?    Dr De Los Santos,   please advise?      Collected 2024 10:39 AM       Status: Final result    Specimen Information: Urine, clean catch   1 Result Note       1 Patient Communication  URINE CULTURE >100,000 CFU/ML Escherichia coli Abnormal            Resulting Agency: [facility] ([facility])     Susceptibility     Escherichia coli     Not Specified    Ampicillin >=32 Resistant    Ampicillin + Sulbactam 16 Intermediate    Cefazolin <=4 Sensitive    Ciprofloxacin 1 Sensitive    Gentamicin <=1 Sensitive    Levofloxacin 1 Sensitive    Meropenem <=0.25 Sensitive    Nitrofurantoin <=16 Sensitive    Piperacillin + Tazobactam <=4 Sensitive    Trimethoprim/Sulfa <=20 Sensitive

## 2024-07-08 NOTE — PROGRESS NOTES
Virtual Virtual Check-In    Morenita Connell verbally consents to a Virtual Video Check-In service on 07/08/24. Patient understands and accepts financial responsibility for any deductible, co-insurance and/or co-pays associated with this service. This visit is conducted using Telemedicine with live, interactive video and audio.     I spoke with Morenita Connell by secure video chat, verified date of birth, and discussed their current concerns:   Duration: 10 minutes    HPI  64-year-old pleasant lady requesting a telemedicine evaluation for recurrence of polyuria dysuria.  She got diagnosed with UTI recently and was treated with Keflex.  Her symptoms are better while on medication and it came back.      Review of Systems:   Review of Systems   Dysuria and polyuria.  No fever no chills no abdominal pain nausea vomiting.    Reviewed Active Problems:  Patient Active Problem List    Diagnosis    Age-related nuclear cataract of both eyes    Mild asthma without complication (HCC)    Diabetes mellitus type 2 without retinopathy (HCC)    Mixed hyperlipidemia    Lateral epicondylitis  of elbow    Osteoarthrosis, elbow, right    Diastasis recti    Lumbago     Log Date: 12/31/2012        Enthesopathy of hip region     Log Date: 12/31/2012        Osteoarthrosis, unspecified whether generalized or localized, pelvic region and thigh     Log Date: 12/31/2012          Reviewed Past Medical History:  Past Medical History:    Arthritis    legs    Asthma (HCC)    Stress induced    Diabetes (HCC)    High blood cholesterol    Need for vaccination    Osteoarthritis    Vaginal yeast infection      Reviewed Family History:  Family History   Problem Relation Age of Onset    Diabetes Father     Hypertension Father     Obesity Father     Obesity Mother     Obesity Sister     Diabetes Brother     Heart Disorder Brother         CABG x 3    Other (copd) Brother     Obesity Sister     Breast Cancer Paternal Cousin Female     Macular degeneration Neg      Glaucoma Neg        Reviewed Social History:  Social History     Socioeconomic History    Marital status:    Tobacco Use    Smoking status: Former     Current packs/day: 0.00     Average packs/day: 1 pack/day for 12.0 years (12.0 ttl pk-yrs)     Types: Cigarettes     Start date: 1992     Quit date: 2004     Years since quittin.8    Smokeless tobacco: Never   Vaping Use    Vaping status: Never Used   Substance and Sexual Activity    Alcohol use: Yes     Comment: socially    Drug use: No      Reviewed Current Medications:  Current Outpatient Medications   Medication Sig Dispense Refill    sulfamethoxazole-trimethoprim DS (BACTRIM DS) 800-160 MG Oral Tab per tablet Take 1 tablet by mouth 2 (two) times daily for 5 days. 10 tablet 0    phenazopyridine (PYRIDIUM) 100 MG Oral Tab Take 1 tablet (100 mg total) by mouth 2 (two) times daily as needed for Pain. 10 tablet 0    Tirzepatide (MOUNJARO) 12.5 MG/0.5ML Subcutaneous Solution Pen-injector Inject 12.5 mg into the skin once a week. 2 mL 3    naproxen 500 MG Oral Tab Take 1 tablet (500 mg total) by mouth 2 (two) times daily with meals. 60 tablet 0    albuterol 108 (90 Base) MCG/ACT Inhalation Aero Soln Inhale 1 puff into the lungs every six hours as needed 18 g 2    valACYclovir 1 G Oral Tab Take 2 tablets (2,000 mg total) by mouth every 12 (twelve) hours. 12 tablet 3    fluticasone-salmeterol (ADVAIR DISKUS) 250-50 MCG/ACT Inhalation Aerosol Powder, Breath Activated Inhale 1 puff into the lungs Q12H. 180 each 3    clotrimazole (3 DAY VAGINAL) 2 % Vaginal Cream Apply once when needed 21 g 0    montelukast 10 MG Oral Tab Take 1 tablet (10 mg total) by mouth daily. 90 tablet 3    atorvastatin 40 MG Oral Tab Take 1 tablet (40 mg total) by mouth daily. 90 tablet 3    vitamin E 400 UNITS Oral Cap Take 1 capsule (400 Units total) by mouth daily.      Blood Glucose Monitoring Suppl (TRUE METRIX METER) Does not apply Device 1 Device daily. 1 each 0     IRON 65 mg by Does not apply route daily.              Physical Exam  There were no vitals filed for this visit.  Physical Exam   Constitutional:       General: She is not in acute distress.     Appearance: Normal appearance.   HENT:      Head: Normocephalic and atraumatic.      Nose: Nose normal.   Neurological:      Mental Status: she is alert and oriented to person, place, and time.   Psychiatric:         Behavior: Behavior normal.         Thought Content: Thought content normal.         Judgment: Judgment normal.       Diagnosis:  1. Acute cystitis without hematuria  I have reviewed the previous urine culture.  Encourage patient on plenty of fluids.  Sent Bactrim to the pharmacy for 5 days.  Sent Pyridium as well.  If there is any worsening symptoms, she will contact me.     Plan: As above.    Follow up: No follow-ups on file.    Please note that the following visit was completed using two-way, real-time interactive audio and/or video communication.  This has been done in good mackenzie to provide continuity of care in the best interest of the provider-patient relationship, due to the ongoing public health crisis/national emergency and because of restrictions of visitation.  There are limitations of this visit as no physical exam could be performed.  Every conscious effort was taken to allow for sufficient and adequate time.  This billing was spent on reviewing labs, medications, radiology tests and decision making.  Appropriate medical decision-making and tests are ordered as detailed in the plan of care above. Morenita Connell understands video evaluation is not a substitute for in person examination or emergency care. Patient advised to go to ER or call 911 for worsening symptoms or acute distress.     This note was prepared using Dragon Medical voice recognition dictation software. As a result errors may occur. When identified these errors have been corrected. While every attempt is made to correct errors during  dictation discrepancies may still exist.  Leo De Los Santos MD

## 2024-07-08 NOTE — TELEPHONE ENCOUNTER
Video appointment scheduled today with the patient .       Future Appointments   Date Time Provider Department Center   7/8/2024  3:15 PM Leo De Los Santos MD [facility]   11/12/2024 10:00 AM Leo De Los Santos MD [facility]       Patient scheduled for a video visit.  Patient advised to complete the E-check in [employer], if active.  Understands to follow the prompts and links to complete the visit.    Patient advised that there may be a co-pay involved with this type of visit.     Patient agreed to proceed, they understand the provider may be calling from a blocked, or unknown phone number on their caller ID and they know to answer the phone.    Best call back:  [phone]

## 2024-07-09 RX ORDER — ATORVASTATIN CALCIUM 40 MG/1
40 TABLET, FILM COATED ORAL DAILY
Qty: 90 TABLET | Refills: 3 | Status: SHIPPED | OUTPATIENT
Start: 2024-07-09

## 2024-07-09 NOTE — TELEPHONE ENCOUNTER
Please review. Protocol Failed; No Protocol    Requested Prescriptions   Pending Prescriptions Disp Refills    ATORVASTATIN 40 MG Oral Tab [Pharmacy Med Name: Atorvastatin Calcium 40 Mg Tab Nort] 90 tablet 0     Sig: TAKE ONE TABLET BY MOUTH ONE TIME DAILY       Cholesterol Medication Protocol Failed - 7/6/2024  3:44 PM        Failed - ALT < 80     Lab Results   Component Value Date    ALT 25 03/22/2023             Failed - ALT resulted within past year        Failed - Lipid panel within past 12 months     Lab Results   Component Value Date    CHOLEST 147 03/22/2023    TRIG 122 03/22/2023    HDL 72 (H) 03/22/2023    LDL 54 03/22/2023    VLDL 18 03/22/2023    NONHDLC 75 03/22/2023             Passed - In person appointment or virtual visit in the past 12 mos or appointment in next 3 mos     Recent Outpatient Visits              Yesterday Acute cystitis without hematuria    [facility] Leo De Los Santos MD    Telemedicine    1 month ago Diabetes mellitus type 2 without retinopathy (HCC)    [facility] Leo De Los Santos MD    Office Visit    5 months ago Controlled type 2 diabetes mellitus without complication, without long-term current use of insulin ([facility])    [facility] Imani Carmichael APRN    Office Visit    8 months ago Diabetes mellitus type 2 without retinopathy ([facility])    [facility] Leo De Los Santos MD    Office Visit    11 months ago Primary osteoarthritis of right knee    [address] Yue Hodge PA-C    Office Visit          Future Appointments         Provider Department Appt Notes    In 4 months Leo De Los Santos MD [facility] 6 MTH FOLLOW UP                           Future Appointments         Provider Department Appt Notes    In  4 months Leo De Los Santos MD [facility], [facility] 6 MTH FOLLOW UP          Recent Outpatient Visits              Yesterday Acute cystitis without hematuria    [facility], [facility] Leo De Los Santos MD    Telemedicine    1 month ago Diabetes mellitus type 2 without retinopathy (HCC)    [facility] Leo De Los Santos MD    Office Visit    5 months ago Controlled type 2 diabetes mellitus without complication, without long-term current use of insulin (HCC)    [facility] Imani Carmichael APRN    Office Visit    8 months ago Diabetes mellitus type 2 without retinopathy (HCC)    [facility] Leo De Los Santos MD    Office Visit    11 months ago Primary osteoarthritis of right knee    [facility], [address] [place] [address] Yue Hodge PA-C    Office Visit

## 2024-08-05 NOTE — ED INITIAL ASSESSMENT (HPI)
Presents with foul odor to urine since yesterday. Denies dysuria or frequency. No abdominal or back pain. No vaginal issues. Unknown no

## 2024-08-11 RX ORDER — MONTELUKAST SODIUM 10 MG/1
10 TABLET ORAL DAILY
Qty: 90 TABLET | Refills: 3 | Status: SHIPPED | OUTPATIENT
Start: 2024-08-11

## 2024-08-11 NOTE — TELEPHONE ENCOUNTER
Please review. Protocol Failed; No Protocol    Requested Prescriptions   Pending Prescriptions Disp Refills    MONTELUKAST 10 MG Oral Tab [Pharmacy Med Name: Montelukast Sodium 10 Mg Tab Auro] 90 tablet 0     Sig: TAKE ONE TABLET BY MOUTH ONE TIME DAILY       Asthma & COPD Medication Protocol Failed - 8/7/2024  9:38 AM        Failed - Asthma Action Score greater than or equal to 20        Failed - AAP/ACT given in last 12 months     No data recorded  No data recorded  No data recorded  No data recorded          Passed - Appointment in past 6 or next 3 months      Recent Outpatient Visits              1 month ago Acute cystitis without hematuria    [facility], [facility] Leo De Los Santos MD    Telemedicine    2 months ago Diabetes mellitus type 2 without retinopathy ([facility])    [facility] Leo De Los Santos MD    Office Visit    6 months ago Controlled type 2 diabetes mellitus without complication, without long-term current use of insulin ([facility])    [facility] Imani Carmichael APRN    Office Visit    9 months ago Diabetes mellitus type 2 without retinopathy ([facility])    [facility] Leo De Los Santos MD    Office Visit    1 year ago Primary osteoarthritis of right knee    [facility], [address] [place] [address] Yue Hodge PA-C    Office Visit          Future Appointments         Provider Department Appt Notes    In 3 months Leo De Los Santos MD [facility] 6 MTH FOLLOW UP                           Future Appointments         Provider Department Appt Notes    In 3 months Leo De Los Santos MD [facility] 6 MTH FOLLOW UP          Recent Outpatient Visits              1 month ago Acute cystitis without hematuria    [facility]    [facility]    2 months ago Diabetes mellitus type 2 without retinopathy (HCC)    [facility], [facility], Leo Salgado MD    Office Visit    6 months ago Controlled type 2 diabetes mellitus without complication, without long-term current use of insulin (HCC)    [facility], [facility], Imani Gallegos APRN    Office Visit    9 months ago Diabetes mellitus type 2 without retinopathy (HCC)    [facility], [facility], Leo Salgado MD    Office Visit    1 year ago Primary osteoarthritis of right knee    [facility], [address] [place] [address] Yue Hodge PAVashtiC    Office Visit

## 2024-09-17 RX ORDER — TIRZEPATIDE 12.5 MG/.5ML
12.5 INJECTION, SOLUTION SUBCUTANEOUS WEEKLY
Qty: 2 ML | Refills: 0 | Status: SHIPPED | OUTPATIENT
Start: 2024-09-17 | End: 2024-09-18

## 2024-09-17 NOTE — TELEPHONE ENCOUNTER
Endocrine Refill protocol for oral and injectable diabetic medications    Protocol Criteria:  FAILED  Reason: No Visit in required time frame mcm sent  -Appointment with Endocrinology completed in the last 6 months or scheduled in the next 3 months    -A1c result below 8.5% in the past 6 months      Verify the above has been completed or scheduled in the appropriate timeline. If so can send a 90 day supply with 1 refill.     Last completed office visit: 2/7/2024 Imani Carmichael APRN   Next scheduled Follow up: no future appt [facility] sent     Last A1c result: Last A1c value was 6.2% done 2/7/2024.

## 2024-09-18 RX ORDER — TIRZEPATIDE 12.5 MG/.5ML
12.5 INJECTION, SOLUTION SUBCUTANEOUS WEEKLY
Qty: 2 ML | Refills: 0 | Status: SHIPPED | OUTPATIENT
Start: 2024-09-18

## 2024-09-18 NOTE — TELEPHONE ENCOUNTER
Notified pt of mounjaro 12.5 mg refill being sent via . Pt is currently scheduled with Amina BHARDWAJ on 11/13.

## 2024-09-18 NOTE — TELEPHONE ENCOUNTER
Endocrine Refill protocol for oral and injectable diabetic medications    Protocol Criteria:  PASSED    -Appointment with Endocrinology completed in the last 6 months or scheduled in the next 3 months    -A1c result below 8.5% in the past 6 months      Verify the above has been completed or scheduled in the appropriate timeline. If so can send a 90 day supply with 1 refill.     Last completed office visit: 2/7/2024 Imani Carmichael APRN   Next scheduled Follow up:   Future Appointments   Date Time Provider Department Center   11/13/2024  8:45 AM Imani Carmichael APRN [facility]      Last A1c result: Last A1c value was 6.2% done 2/7/2024.

## 2024-09-19 DIAGNOSIS — J45.909 MILD ASTHMA WITHOUT COMPLICATION, UNSPECIFIED WHETHER PERSISTENT (HCC): ICD-10-CM

## 2024-09-24 RX ORDER — FLUTICASONE PROPIONATE AND SALMETEROL 250; 50 UG/1; UG/1
1 POWDER RESPIRATORY (INHALATION) EVERY 12 HOURS
Qty: 180 EACH | Refills: 3 | Status: SHIPPED | OUTPATIENT
Start: 2024-09-24

## 2024-09-24 NOTE — TELEPHONE ENCOUNTER
Please Review. Protocol Failed; No Protocol     Requested Prescriptions   Pending Prescriptions Disp Refills    ADVAIR DISKUS 250-50 MCG/ACT Inhalation Aerosol Powder, Breath Activated [Pharmacy Med Name: Advair Diskus 250-50 Mcg/Act Aer Glax]  0     Sig: Inhale 1 puff into the lungs every 12 hours       Asthma & COPD Medication Protocol Failed - 9/19/2024  9:25 AM        Failed - Asthma Action Score greater than or equal to 20        Failed - AAP/ACT given in last 12 months     No data recorded  No data recorded  No data recorded  No data recorded          Passed - Appointment in past 6 or next 3 months      Recent Outpatient Visits              2 months ago Acute cystitis without hematuria    [facility] Leo De Los Santos MD    Telemedicine    4 months ago Diabetes mellitus type 2 without retinopathy (HCC)    [facility] Leo De Los Santos MD    Office Visit    7 months ago Controlled type 2 diabetes mellitus without complication, without long-term current use of insulin (HCC)    [facility] Imani Carmichael APRN    Office Visit    10 months ago Diabetes mellitus type 2 without retinopathy (HCC)    [facility] eLo De Los Santos MD    Office Visit    1 year ago Primary osteoarthritis of right knee    [address] Yue Hodge PA-C    Office Visit          Future Appointments         Provider Department Appt Notes    In 1 month Leo De Los Santos MD [facility] 6 MTH FOLLOW UP    In 1 month Imani Carmichael APRN [facility] Prescription refill and A1C                           Future Appointments         Provider Department Appt Notes    In 1 month Leo De Los Santos MD [facility], [facility] [place] 6 MTH FOLLOW UP    In 1 month [facility] Prescription refill and A1C          Recent Outpatient Visits              2 months ago Acute cystitis without hematuria    [facility], [facility] Leo Salgado MD    Telemedicine    4 months ago Diabetes mellitus type 2 without retinopathy (HCC)    [facility], [facility] Leo Salgado MD    Office Visit    7 months ago Controlled type 2 diabetes mellitus without complication, without long-term current use of insulin (HCC)    [facility]    Office Visit    10 months ago Diabetes mellitus type 2 without retinopathy (HCC)    [facility] Leo Salgado MD    Office Visit    1 year ago Primary osteoarthritis of right knee    [facility], [address] [place] [address] Yue Hodge PAVashtiC    Office Visit

## 2024-10-13 DIAGNOSIS — E11.9 CONTROLLED TYPE 2 DIABETES MELLITUS WITHOUT COMPLICATION, WITHOUT LONG-TERM CURRENT USE OF INSULIN (HCC): Primary | ICD-10-CM

## 2024-10-14 NOTE — TELEPHONE ENCOUNTER
Endocrine Refill protocol for oral and injectable diabetic medications    Protocol Criteria:  FAILED  Reason: No labs completed in required time frame    If all below requirements are met, send a 90-day supply with 1 refill per provider protocol.    Verify appointment with Endocrinology completed in the last 6 months or scheduled in the next 3 months.  Verify A1C has been completed within the last 6 months and is below 8.5%     Last completed office visit: 2/7/2024 Imani Carmichael APRN   Next scheduled Follow up:   Future Appointments   Date Time Provider Department Center   11/12/2024 10:00 AM Leo De Los Santos MD [facility]   11/13/2024  8:45 AM Imani Carmichael APRN [facility]      Last A1c result: Last A1c value was 6.2% done 2/7/2024.

## 2024-10-16 DIAGNOSIS — E11.9 CONTROLLED TYPE 2 DIABETES MELLITUS WITHOUT COMPLICATION, WITHOUT LONG-TERM CURRENT USE OF INSULIN (HCC): ICD-10-CM

## 2024-11-12 ENCOUNTER — OFFICE VISIT (OUTPATIENT)
Facility: CLINIC | Age: 64
End: 2024-11-12

## 2024-11-12 ENCOUNTER — LAB ENCOUNTER (OUTPATIENT)
Dept: LAB | Facility: HOSPITAL | Age: 64
End: 2024-11-12
Attending: INTERNAL MEDICINE
Payer: MEDICAID

## 2024-11-12 ENCOUNTER — HOSPITAL ENCOUNTER (OUTPATIENT)
Dept: GENERAL RADIOLOGY | Facility: HOSPITAL | Age: 64
Discharge: HOME | End: 2024-11-12
Attending: INTERNAL MEDICINE
Payer: MEDICAID

## 2024-11-12 ENCOUNTER — TELEPHONE (OUTPATIENT)
Dept: INTERNAL MEDICINE CLINIC | Facility: CLINIC | Age: 64
End: 2024-11-12

## 2024-11-12 VITALS
BODY MASS INDEX: 27.05 KG/M2 | HEIGHT: 62 IN | SYSTOLIC BLOOD PRESSURE: 122 MMHG | HEART RATE: 84 BPM | DIASTOLIC BLOOD PRESSURE: 66 MMHG | WEIGHT: 147 LBS | OXYGEN SATURATION: 96 %

## 2024-11-12 DIAGNOSIS — E78.2 MIXED HYPERLIPIDEMIA: ICD-10-CM

## 2024-11-12 DIAGNOSIS — E11.9 DIABETES MELLITUS TYPE 2 WITHOUT RETINOPATHY (HCC): Primary | ICD-10-CM

## 2024-11-12 DIAGNOSIS — M25.511 RIGHT SHOULDER PAIN, UNSPECIFIED CHRONICITY: ICD-10-CM

## 2024-11-12 DIAGNOSIS — J45.909 MILD ASTHMA WITHOUT COMPLICATION, UNSPECIFIED WHETHER PERSISTENT (HCC): ICD-10-CM

## 2024-11-12 DIAGNOSIS — E11.9 DIABETES MELLITUS TYPE 2 WITHOUT RETINOPATHY (HCC): ICD-10-CM

## 2024-11-12 DIAGNOSIS — E11.9 CONTROLLED TYPE 2 DIABETES MELLITUS WITHOUT COMPLICATION, WITHOUT LONG-TERM CURRENT USE OF INSULIN (HCC): ICD-10-CM

## 2024-11-12 LAB
ALBUMIN SERPL-MCNC: 4.3 G/DL (ref 3.2–4.8)
ALBUMIN/GLOB SERPL: 1.4 {RATIO} (ref 1–2)
ALP LIVER SERPL-CCNC: 125 U/L
ALT SERPL-CCNC: 14 U/L
ANION GAP SERPL CALC-SCNC: 9 MMOL/L (ref 0–18)
AST SERPL-CCNC: 21 U/L (ref ?–34)
BILIRUB SERPL-MCNC: 1.3 MG/DL (ref 0.2–1.1)
BUN BLD-MCNC: 13 MG/DL (ref 9–23)
BUN/CREAT SERPL: 18.3 (ref 10–20)
CALCIUM BLD-MCNC: 9.9 MG/DL (ref 8.7–10.4)
CHLORIDE SERPL-SCNC: 108 MMOL/L (ref 98–112)
CHOLEST SERPL-MCNC: 176 MG/DL (ref ?–200)
CO2 SERPL-SCNC: 28 MMOL/L (ref 21–32)
CREAT BLD-MCNC: 0.71 MG/DL
CREAT UR-SCNC: 125.7 MG/DL
DEPRECATED RDW RBC AUTO: 47.3 FL (ref 35.1–46.3)
EGFRCR SERPLBLD CKD-EPI 2021: 95 ML/MIN/1.73M2 (ref 60–?)
ERYTHROCYTE [DISTWIDTH] IN BLOOD BY AUTOMATED COUNT: 13.5 % (ref 11–15)
FASTING PATIENT LIPID ANSWER: YES
FASTING STATUS PATIENT QL REPORTED: YES
GLOBULIN PLAS-MCNC: 3 G/DL (ref 2–3.5)
GLUCOSE BLD-MCNC: 79 MG/DL (ref 70–99)
HCT VFR BLD AUTO: 43.3 %
HDLC SERPL-MCNC: 60 MG/DL (ref 40–59)
HGB BLD-MCNC: 13.8 G/DL
LDLC SERPL CALC-MCNC: 98 MG/DL (ref ?–100)
MCH RBC QN AUTO: 30.1 PG (ref 26–34)
MCHC RBC AUTO-ENTMCNC: 31.9 G/DL (ref 31–37)
MCV RBC AUTO: 94.5 FL
MICROALBUMIN UR-MCNC: 0.4 MG/DL
MICROALBUMIN/CREAT 24H UR-RTO: 3.2 UG/MG (ref ?–30)
NONHDLC SERPL-MCNC: 116 MG/DL (ref ?–130)
OSMOLALITY SERPL CALC.SUM OF ELEC: 299 MOSM/KG (ref 275–295)
PLATELET # BLD AUTO: 266 10(3)UL (ref 150–450)
POTASSIUM SERPL-SCNC: 4.4 MMOL/L (ref 3.5–5.1)
PROT SERPL-MCNC: 7.3 G/DL (ref 5.7–8.2)
RBC # BLD AUTO: 4.58 X10(6)UL
SODIUM SERPL-SCNC: 145 MMOL/L (ref 136–145)
TRIGL SERPL-MCNC: 102 MG/DL (ref 30–149)
TSI SER-ACNC: 0.71 UIU/ML (ref 0.55–4.78)
VLDLC SERPL CALC-MCNC: 17 MG/DL (ref 0–30)
WBC # BLD AUTO: 6.1 X10(3) UL (ref 4–11)

## 2024-11-12 PROCEDURE — 80053 COMPREHEN METABOLIC PANEL: CPT

## 2024-11-12 PROCEDURE — 73030 X-RAY EXAM OF SHOULDER: CPT | Performed by: INTERNAL MEDICINE

## 2024-11-12 PROCEDURE — 80061 LIPID PANEL: CPT

## 2024-11-12 PROCEDURE — 84443 ASSAY THYROID STIM HORMONE: CPT

## 2024-11-12 PROCEDURE — 82570 ASSAY OF URINE CREATININE: CPT

## 2024-11-12 PROCEDURE — 99214 OFFICE O/P EST MOD 30 MIN: CPT | Performed by: INTERNAL MEDICINE

## 2024-11-12 PROCEDURE — 82043 UR ALBUMIN QUANTITATIVE: CPT

## 2024-11-12 PROCEDURE — 85027 COMPLETE CBC AUTOMATED: CPT

## 2024-11-12 PROCEDURE — 36415 COLL VENOUS BLD VENIPUNCTURE: CPT

## 2024-11-12 RX ORDER — FLUCONAZOLE 150 MG/1
150 TABLET ORAL ONCE
COMMUNITY
Start: 2024-06-30 | End: 2024-11-12

## 2024-11-12 RX ORDER — ALBUTEROL SULFATE 90 UG/1
INHALANT RESPIRATORY (INHALATION)
Qty: 18 G | Refills: 2 | Status: SHIPPED | OUTPATIENT
Start: 2024-11-12

## 2024-11-12 RX ORDER — VALACYCLOVIR HYDROCHLORIDE 1 G/1
2000 TABLET, FILM COATED ORAL EVERY 12 HOURS SCHEDULED
Qty: 4 TABLET | Refills: 3 | Status: SHIPPED | OUTPATIENT
Start: 2024-11-12 | End: 2024-11-13

## 2024-11-12 RX ORDER — MELOXICAM 15 MG/1
15 TABLET ORAL DAILY
Qty: 30 TABLET | Refills: 0 | Status: SHIPPED | OUTPATIENT
Start: 2024-11-12

## 2024-11-12 NOTE — PROGRESS NOTES
Morenita Connell is a 64 year old female.  Chief Complaint   Patient presents with    Follow - Up     6 month follow up, pt states she has been having breakouts in nose area, and has been having soreness in right arm for three days, and would like to discuss lowering atorvastatin dosage     HPI:   64-year-old pleasant lady with medical history significant for diabetes, dyslipidemia here for follow-up.  She reports that she is doing well except having pain on her right shoulder coming down to the arm.  Denies any trauma or fall.  Pain is worse with activity.  Denies any chest pain or heaviness.  Denies any shortness of breath abdominal pain nausea vomiting.  She does get recurrent cold sores.      Current Outpatient Medications   Medication Sig Dispense Refill    valACYclovir 1 G Oral Tab Take 2 tablets (2,000 mg total) by mouth every 12 (twelve) hours for 1 day. 4 tablet 3    albuterol 108 (90 Base) MCG/ACT Inhalation Aero Soln Inhale 1 puff into the lungs every six hours as needed 18 g 2    Meloxicam 15 MG Oral Tab Take 1 tablet (15 mg total) by mouth daily. With food 30 tablet 0    MOUNJARO 12.5 MG/0.5ML Subcutaneous Solution Pen-injector Inject 12.5 mg into the skin once a week. 6 mL 1    fluticasone-salmeterol (ADVAIR DISKUS) 250-50 MCG/ACT Inhalation Aerosol Powder, Breath Activated Inhale 1 puff into the lungs Q12H. 180 each 3    montelukast 10 MG Oral Tab Take 1 tablet (10 mg total) by mouth daily. 90 tablet 3    atorvastatin 40 MG Oral Tab Take 1 tablet (40 mg total) by mouth daily. 90 tablet 3    clotrimazole (3 DAY VAGINAL) 2 % Vaginal Cream Apply once when needed 21 g 0    vitamin E 400 UNITS Oral Cap Take 1 capsule (400 Units total) by mouth daily.      Blood Glucose Monitoring Suppl (TRUE METRIX METER) Does not apply Device 1 Device daily. 1 each 0    IRON 65 mg by Does not apply route daily.          Past Medical History:    Arthritis    legs    Asthma (HCC)    Stress induced    Diabetes (HCC)    High  blood cholesterol    Need for vaccination    Osteoarthritis    Vaginal yeast infection      Past Surgical History:   Procedure Laterality Date           delivery only      x3    Gastric bypass,obesity,sb reconstruc      Hernia surgery      Other      gastric bypass    Other surgical history      hernia repair/tummy tuck    Other surgical history      hernia repair    Total hip replacement Right 2016    Total hip replacement Left 10/2016      Social History:  Social History     Socioeconomic History    Marital status:    Tobacco Use    Smoking status: Former     Current packs/day: 0.00     Average packs/day: 1 pack/day for 12.0 years (12.0 ttl pk-yrs)     Types: Cigarettes     Start date: 1992     Quit date: 2004     Years since quittin.1    Smokeless tobacco: Never   Vaping Use    Vaping status: Never Used   Substance and Sexual Activity    Alcohol use: Yes     Comment: socially    Drug use: No      Family History   Problem Relation Age of Onset    Diabetes Father     Hypertension Father     Obesity Father     Obesity Mother     Obesity Sister     Diabetes Brother     Heart Disorder Brother         CABG x 3    Other (copd) Brother     Obesity Sister     Breast Cancer Paternal Cousin Female     Macular degeneration Neg     Glaucoma Neg       Allergies[1]     REVIEW OF SYSTEMS:   Review of Systems   Review of Systems   Constitutional: Negative for activity change, appetite change and fever.   HENT: Negative for congestion and voice change.    Respiratory: Negative for cough and shortness of breath.    Cardiovascular: Negative for chest pain.   Gastrointestinal: Negative for abdominal distention, abdominal pain and vomiting.   Genitourinary: Negative for hematuria.   Skin: Negative for wound.   Psychiatric/Behavioral: Negative for behavioral problems.   Wt Readings from Last 5 Encounters:   24 147 lb (66.7 kg)   24 159 lb 8 oz (72.3 kg)   24 157 lb (71.2 kg)    10/31/23 155 lb (70.3 kg)   08/09/23 162 lb (73.5 kg)     Body mass index is 26.89 kg/m².      EXAM:   /66   Pulse 84   Ht 5' 2\" (1.575 m)   Wt 147 lb (66.7 kg)   SpO2 96%   BMI 26.89 kg/m²   Physical Exam   Constitutional:       Appearance: Normal appearance.   HENT:      Head: Normocephalic.   Eyes:      Conjunctiva/sclera: Conjunctivae normal.   Cardiovascular:      Rate and Rhythm: Normal rate and regular rhythm.      Heart sounds: Normal heart sounds. No murmur heard.  Pulmonary:      Effort: Pulmonary effort is normal.      Breath sounds: Normal breath sounds. No rhonchi or rales.   Abdominal:      General: Bowel sounds are normal.      Palpations: Abdomen is soft.      Tenderness: There is no abdominal tenderness.   Musculoskeletal:      Cervical back: Neck supple.      Right lower leg: No edema.      Left lower leg: No edema.   Skin:     General: Skin is warm and dry.   Neurological:      General: No focal deficit present.      Mental Status: He is alert and oriented to person, place, and time. Mental status is at baseline.   Psychiatric:         Mood and Affect: Mood normal.         Behavior: Behavior normal.   Right shoulder-no erythema warmth.  Crepitus present.  Range of motion is slightly restricted.    ASSESSMENT AND PLAN:   1. Diabetes mellitus type 2 without retinopathy (HCC)  Diabetes is fairly well-controlled.  Encouraged her to make appointment to see ophthalmology.  Continue statins.  Significant weight loss with GLP-1.    2. Mixed hyperlipidemia  Lab Results   Component Value Date    LDL 98 11/12/2024    AST 21 11/12/2024    ALT 14 11/12/2024      Encouraged patient to eat healthy.  Avoid fat fried foods and increase activity as tolerated.  We will monitor lipid profile and liver function test.      3. Mild asthma without complication, unspecified whether persistent (HCC)  Asthma is fairly well-controlled.  Continue with the current inhaler regimen.    4. Right shoulder pain,  unspecified chronicity  Differential diagnosis includes adhesive capsulitis versus osteoarthritis.  Will do a trial of meloxicam with meals.  Will get x-ray of the shoulder.  Will decide about physical therapy versus Ortho evaluation after this.  ,  - XR SHOULDER, COMPLETE (MIN 2 VIEWS), RIGHT (CPT=73030); Future    Plan:Medication prescribed, x-ray ordered I have encouraged her to complete blood testing.  I will see her back in 4 months      The patient indicates understanding of these issues and agrees to the plan.  No follow-ups on file.    This note was prepared using Dragon Medical voice recognition dictation software. As a result errors may occur. When identified these errors have been corrected. While every attempt is made to correct errors during dictation discrepancies may still exist.       [1]   Allergies  Allergen Reactions    Aspirin     Ibuprofen WHEEZING     If received in large doses starts wheezing

## 2024-11-12 NOTE — TELEPHONE ENCOUNTER
Spoke to Priscilla from [facility]- she will discontinue medication.     Response to cancel request received from pharmacy.  Received: Today  [facility]  [facility] Ref[facility]  The pharmacy received the electronic cancel request, but could not cancel the prescription. Additional follow up tasks may be necessary based on the pharmacy response noted below.    Pharmacy Note: Prescription not found, please contact pharmacy          Pharmacy    [id number] - [place], [place] - [address] [phone], [phone]   [address] [place] [address]   Phone: [phone] Fax: [phone]   Hours: Not open 24 hours     Outpatient Medication Detail     Disp Refills Start End    naproxen 500 MG Oral Tab (Discontinued) 60 tablet 0 5/12/2024 11/12/2024    Sig - Route: Take 1 tablet (500 mg total) by mouth 2 (two) times daily with meals. - Oral    Sent to pharmacy as: Naproxen 500 MG Oral Tablet (Naprosyn)    Reason for Discontinue:    Dosage Changed, Please See New Prescription    E-Prescribing Status: Receipt confirmed by pharmacy (5/12/2024 10:09 PM CDT)    E-Cancel Status: Request denied by pharmacy (11/12/2024 10:29 AM CST)       E-Cancel Status Note: Prescription not found, please contact pharmacy    Renewals    Renewal provider: Leo De Los Santos MD         Order Providers    Prescribing Provider Encounter Provider   Radha Cartwright APRN Abraham, Joseph, MD      Supervision Information    Encounter Supervising Provider Type of Supervision   Leo De Los Santos MD Collaborating Physician   Order Supervising Provider   Loe De Los Santos MD         Encounter    View Encounter              This Order Has Been Discontinued    Order Status Reason By On   Discontinued    Dosage Changed, Please See New Prescription Leo De Los Santos MD 11/12/24 [id number]

## 2024-11-13 ENCOUNTER — OFFICE VISIT (OUTPATIENT)
Dept: ENDOCRINOLOGY CLINIC | Facility: CLINIC | Age: 64
End: 2024-11-13
Payer: MEDICAID

## 2024-11-13 VITALS
SYSTOLIC BLOOD PRESSURE: 99 MMHG | WEIGHT: 148.19 LBS | BODY MASS INDEX: 27 KG/M2 | DIASTOLIC BLOOD PRESSURE: 64 MMHG | HEART RATE: 75 BPM

## 2024-11-13 DIAGNOSIS — E11.9 CONTROLLED TYPE 2 DIABETES MELLITUS WITHOUT COMPLICATION, WITHOUT LONG-TERM CURRENT USE OF INSULIN (HCC): Primary | ICD-10-CM

## 2024-11-13 LAB
CARTRIDGE EXPIRATION DATE: ABNORMAL DATE
GLUCOSE BLOOD: 79
HEMOGLOBIN A1C: 5.7 % (ref 4.3–5.6)
TEST STRIP EXPIRATION DATE: NORMAL DATE
TEST STRIP LOT #: NORMAL NUMERIC

## 2024-11-13 PROCEDURE — 83036 HEMOGLOBIN GLYCOSYLATED A1C: CPT

## 2024-11-13 PROCEDURE — 82947 ASSAY GLUCOSE BLOOD QUANT: CPT

## 2024-11-13 PROCEDURE — 99214 OFFICE O/P EST MOD 30 MIN: CPT

## 2024-11-13 RX ORDER — TIRZEPATIDE 10 MG/.5ML
10 INJECTION, SOLUTION SUBCUTANEOUS WEEKLY
Qty: 2 ML | Refills: 3 | Status: SHIPPED | OUTPATIENT
Start: 2024-11-13 | End: 2024-11-16

## 2024-11-13 NOTE — PROGRESS NOTES
Name: Morenita Connell  Date: 11/13/24    Referring Physician: Leo De Los Santos    HISTORY OF PRESENT ILLNESS   Morenita Connell is a 61 year old female who presents for follow up for Diabetes mellitus type 2    Diabetes History:  Diagnosed: 2019  Patient has not had hospitalizations for blood sugar issues     Family history of DM- Father, brother.     Prior glycohemoglobin were 10/2021-8.3%; 1/2022-8.5%; 7.5% 4/2022; 6.7% 9/2022; 6.3% 12/2022; 6.3% 3/2023; 6.2% 2/2024;  5.7% POC today      Dietary compliance: Good - is following low CHO diet.      Recall: using portioned plate     Breakfast- coffee typically   Lunch/dinner- 1/2 sandwich or salad , protein with vegetable   Snack- nuts   Beverages-water, coffee, coke zero.    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 Was doing swimming three times weekly and cardio with resistance exercises 2 days weekly     Polyuria/polydipsia: No  Blurred vision: No    Episodes of hypoglycemia: None    Blood Glucose:  Has not been checking recently     Previous DM therapies:  Jardiance- recurrent yeast infections  Trulicity - inadequate response  Rybelsus- inadequate response   Glipizide - hypoglycemia     Current DM Regimen:  Mounjaro 12.5 mg subcutaneous weekly       Modifying factors:  Medication adherence: yes  Recent steroids, illness or infections: No    REVIEW OF SYSTEMS  Eyes: Diabetic retinopathy present: No            Most recent visit to eye doctor in last 12 months: Yes- 11/2023; due now for appt     CV: Cardiovascular disease present: No         Hypertension present: No         Hyperlipidemia present: Yes         Peripheral Vascular Disease present: No    : Nephropathy present: No    Neuro: Neuropathy present: No, denies symptoms     Skin: Infection or ulceration: No    Osteoporosis: No    Thyroid disease: No      Medications:     Current Outpatient Medications:     metFORMIN 500 MG Oral Tab, Take 1,000  mg by mouth at bedtime., Disp: , Rfl:     glyBURIDE 5 MG Oral Tab, Take 10 mg by mouth daily with breakfast., Disp: , Rfl:     albuterol (VENTOLIN HFA) 108 (90 Base) MCG/ACT Inhalation Aero Soln, Inhale 2 puffs into the lungs every 6 (six) hours as needed for Wheezing., Disp: 1 each, Rfl: 3    montelukast (SINGULAIR) 10 MG Oral Tab, Take 1 tablet (10 mg total) by mouth daily., Disp: 90 tablet, Rfl: 1    atorvastatin 40 MG Oral Tab, Take 1 tablet (40 mg total) by mouth daily., Disp: 90 tablet, Rfl: 1    Glucose Blood (TRUE METRIX BLOOD GLUCOSE TEST) In Vitro Strip, TEST BLOOD SUGAR ONCE A DAY, Disp: 100 strip, Rfl: 3    valACYclovir HCl 1 G Oral Tab, Take 2 tablets (2,000 mg total) by mouth every 12 (twelve) hours., Disp: 4 tablet, Rfl: 3    ONETOUCH VERIO FLEX SYSTEM w/Device Does not apply Kit, 1 each by Does not apply route daily., Disp: 1 kit, Rfl: 0    IRON, 65 mg by Does not apply route daily.  , Disp: , Rfl:      Allergies:     Aspirin                   Ibuprofen               WHEEZING    Comment:If received in large doses starts wheezing    Social History:   Social History    Socioeconomic History      Marital status:     Tobacco Use      Smoking status: Former Smoker        Packs/day: 1.00        Years: 12.00        Pack years: 12        Types: Cigarettes        Quit date: 2004        Years since quittin.4      Smokeless tobacco: Never Used    Vaping Use      Vaping Use: Never used    Substance and Sexual Activity      Alcohol use: Yes        Comment: socially      Drug use: No      Medical History:   Past Medical History:   Diagnosis Date    Arthritis     legs    Asthma     Stress induced    High blood cholesterol     Need for vaccination 2020    Osteoarthritis     Vaginal yeast infection 2020       Surgical history:   Past Surgical History:   Procedure Laterality Date           DELIVERY ONLY      x3    GASTRIC BYPASS,OBESITY,SB RECONSTRUC      HERNIA SURGERY       OTHER      gastric bypass    OTHER SURGICAL HISTORY      hernia repair/tummy tuck    OTHER SURGICAL HISTORY      hernia repair    TOTAL HIP REPLACEMENT Right 08/2016    TOTAL HIP REPLACEMENT Left 10/2016         PHYSICAL EXAM  Vitals:    11/13/24 0836   BP: 99/64   Pulse: 75         General Appearance:  alert, well developed, in no acute distress  Eyes:  normal conjunctivae, sclera, and normal pupils  Back: no kyphosis or back tenderness  Cardiovascular:  regular rate, rhythm, , no murmurs, S3 or S4  Musculoskeletal:  normal muscle strength and tone  Skin:  normal moisture and skin texture  Hair & Nails:  normal scalp hair     Hematologic:  no excessive bruising  Neuro:  sensory grossly intact and motor grossly intact.  Psychiatric:  oriented to time, self, and place  Nutritional:  no abnormal weight gain or loss      ASSESSMENT/PLAN:    Diabetes mellitus type 2 controlled.   -HgA1c- 5.7% POC today   -Congratulated patient on improved glycemic control.  - Reviewed pathogenesis of diabetes.   -Reviewed ABC's of diabetes  - Reviewed importance of good glycemic control to prevent microvascular and macrovascular complications including nephropathy, neuropathy, retinopathy, and cardiovascular disease.  - Reviewed importance of SBGM- check glucose  daily   - Reviewed target glucose goals for patient  fasting and <180 post prandially   - Reviewed importance of following diabetic diet- recommended 135 grams of CHO per day or 45 grams per meal.   - Provided patient education materials    -Decrease Mounjaro to 10 mg subcutaneous weekly.     -Normotensive  -UTD with optho but due for yearly appt now.   -normal foot exam   -no nephropathy 11/2024       Dyslipidemia  -Continue Atorvastatin 40mg daily   - Last lipids 11/2024- LDL- 98    RTC in 6 months   11/13/24  BENTON Russell    A total of  35 minutes was spent on obtaining history, reviewing pertinent imaging/labs and specialists notes, evaluating patient,  providing multiple treatment options, reinforcing diet/exercise and compliance, and completing documentation.

## 2024-11-18 RX ORDER — TIRZEPATIDE 10 MG/.5ML
10 INJECTION, SOLUTION SUBCUTANEOUS WEEKLY
Qty: 2 ML | Refills: 3 | Status: SHIPPED | OUTPATIENT
Start: 2024-11-18

## 2024-11-18 NOTE — TELEPHONE ENCOUNTER
Endocrine Refill protocol for oral and injectable diabetic medications    Protocol Criteria:  PASSED  Reason: N/A    If all below requirements are met, send a 90-day supply with 1 refill per provider protocol.    Verify appointment with Endocrinology completed in the last 6 months or scheduled in the next 3 months.  Verify A1C has been completed within the last 6 months and is below 8.5%     Last completed office visit: 11/13/2024 Imani Carmichael APRN   Next scheduled Follow up:   Future Appointments   Date Time Provider Department Center   5/12/2025 10:00 AM Leo De Los Santos MD [facility]      Last A1c result: Last A1c value was 5.7% done 11/13/2024.

## 2025-02-03 ENCOUNTER — TELEPHONE (OUTPATIENT)
Dept: ENDOCRINOLOGY CLINIC | Facility: CLINIC | Age: 65
End: 2025-02-03

## 2025-02-03 NOTE — TELEPHONE ENCOUNTER
Patient would like to know if a new prescription refill can be sent to the [facility] for Mounjaro. Patient wants to know if it can be increased to 12.5 and if the pharmacy does not have it in stock then she would like the 15 mg.

## 2025-02-03 NOTE — TELEPHONE ENCOUNTER
APRN please advise per LOV mounjaro was decreased to 10mg Okay to send 12.5mg?   Patient has H/o CKD stage 4 with Baseline Cr 1.4 to 1.5   admitted  with BUN 21 and Cr 1.4- likely baseline   lithium level 0.9 wnl  UA neg  Mon BMP

## 2025-02-04 RX ORDER — TIRZEPATIDE 12.5 MG/.5ML
12.5 INJECTION, SOLUTION SUBCUTANEOUS WEEKLY
Qty: 2 ML | Refills: 0 | Status: SHIPPED | OUTPATIENT
Start: 2025-02-04 | End: 2025-03-03

## 2025-02-04 NOTE — TELEPHONE ENCOUNTER
Ok to try one month of the 12.5mg subcutaneous weekly since 10mg is not in stock but I would like her to return to 10mg dose with her next refill.

## 2025-02-05 ENCOUNTER — HOSPITAL ENCOUNTER (OUTPATIENT)
Age: 65
Discharge: HOME | End: 2025-02-05
Payer: MEDICAID

## 2025-02-05 ENCOUNTER — TELEPHONE (OUTPATIENT)
Dept: INTERNAL MEDICINE CLINIC | Facility: CLINIC | Age: 65
End: 2025-02-05

## 2025-02-05 VITALS
RESPIRATION RATE: 16 BRPM | DIASTOLIC BLOOD PRESSURE: 62 MMHG | SYSTOLIC BLOOD PRESSURE: 118 MMHG | TEMPERATURE: 99 F | OXYGEN SATURATION: 99 % | HEART RATE: 99 BPM

## 2025-02-05 DIAGNOSIS — U07.1 COVID-19: Primary | ICD-10-CM

## 2025-02-05 DIAGNOSIS — H66.001 NON-RECURRENT ACUTE SUPPURATIVE OTITIS MEDIA OF RIGHT EAR WITHOUT SPONTANEOUS RUPTURE OF TYMPANIC MEMBRANE: ICD-10-CM

## 2025-02-05 LAB — SARS-COV-2 RNA RESP QL NAA+PROBE: DETECTED

## 2025-02-05 PROCEDURE — 99213 OFFICE O/P EST LOW 20 MIN: CPT

## 2025-02-05 PROCEDURE — 99214 OFFICE O/P EST MOD 30 MIN: CPT

## 2025-02-05 NOTE — ED PROVIDER NOTES
Patient Seen in: [facility]      History     Chief Complaint   Patient presents with    Cough/URI     Stated Complaint: cough    Subjective:   HPI    64-year-old female here for evaluation of runny nose, congestion and headache that started yesterday, right ear pain and cough that started this morning.  She denies fever or chills, vomiting or diarrhea, dizziness, chest pain or shortness of breath.  She is eating and drinking well.  She took at home COVID test that was  but it was positive.    Objective:     Past Medical History:    Arthritis    legs    Asthma (HCC)    Stress induced    Diabetes (HCC)    High blood cholesterol    Need for vaccination    Osteoarthritis    Vaginal yeast infection              Past Surgical History:   Procedure Laterality Date           delivery only      x3    Gastric bypass,obesity,sb reconstruc      Hernia surgery      Other      gastric bypass    Other surgical history      hernia repair/tummy tuck    Other surgical history      hernia repair    Total hip replacement Right 2016    Total hip replacement Left 10/2016                Social History     Socioeconomic History    Marital status:    Tobacco Use    Smoking status: Former     Current packs/day: 0.00     Average packs/day: 1 pack/day for 12.0 years (12.0 ttl pk-yrs)     Types: Cigarettes     Start date: 1992     Quit date: 2004     Years since quittin.3    Smokeless tobacco: Never   Vaping Use    Vaping status: Never Used   Substance and Sexual Activity    Alcohol use: Yes     Comment: socially    Drug use: No              Review of Systems    Positive for stated complaint: cough  Other systems are as noted in HPI.  Constitutional and vital signs reviewed.      All other systems reviewed and negative except as noted above.    Physical Exam     ED Triage Vitals [25 1616]   /62   Pulse 99   Resp 16   Temp 99 °F (37.2 °C)   Temp src Oral   SpO2 99 %   O2  Device None (Room air)       Current Vitals:   Vital Signs  BP: 118/62  Pulse: 99  Resp: 16  Temp: 99 °F (37.2 °C)  Temp src: Oral    Oxygen Therapy  SpO2: 99 %  O2 Device: None (Room air)        Physical Exam  Vitals and nursing note reviewed.   Constitutional:       General: She is not in acute distress.     Appearance: Normal appearance. She is not ill-appearing, toxic-appearing or diaphoretic.   HENT:      Right Ear: No drainage, swelling or tenderness. No middle ear effusion. No hemotympanum. Tympanic membrane is erythematous and bulging.      Left Ear: Tympanic membrane, ear canal and external ear normal. No drainage, swelling or tenderness.  No middle ear effusion. No hemotympanum. Tympanic membrane is not erythematous or bulging.      Nose: Congestion and rhinorrhea present.      Mouth/Throat:      Lips: Pink.      Mouth: Mucous membranes are moist.      Pharynx: Oropharynx is clear. Uvula midline. Postnasal drip present. No pharyngeal swelling, oropharyngeal exudate, posterior oropharyngeal erythema or uvula swelling.      Tonsils: No tonsillar exudate or tonsillar abscesses.   Eyes:      Pupils: Pupils are equal, round, and reactive to light.   Cardiovascular:      Rate and Rhythm: Normal rate.      Pulses: Normal pulses.   Pulmonary:      Effort: Pulmonary effort is normal. No tachypnea, prolonged expiration, respiratory distress or retractions.      Breath sounds: Normal breath sounds and air entry. No stridor, decreased air movement or transmitted upper airway sounds. No decreased breath sounds, wheezing, rhonchi or rales.   Musculoskeletal:      Cervical back: Normal range of motion and neck supple. No tenderness.   Neurological:      Mental Status: She is alert.             ED Course     Labs Reviewed   RAPID SARS-COV-2 BY PCR - Abnormal; Notable for the following components:       Result Value    Rapid SARS-CoV-2 by PCR Detected (*)     All other components within normal limits       ED Course as of  02/05/25 1655  ------------------------------------------------------------  Time: 02/05 1631  Value: Rapid SARS-CoV-2 by PCR(!)  Comment: (Reviewed)            MDM     64-year-old female here for evaluation of runny nose congestion and headache that started yesterday, right ear pain and cough that started today.    On exam patient well-appearing lungs are clear with no wheezing stridor crackles, left TM and canal normal, right TM bulging with erythema, canal normal.  Pharynx with postnasal drip no swelling or exudate.  Patient is congested with rhinorrhea.    Differential diagnoses reflecting the complexity of care include but are not limited to: COVID, other viral syndrome, otitis media, earache without infection  Comorbidities that add complexity to management include: Asthma, high cholesterol, diabetes, arthritis  History obtained by an independent source was from: Patient  My independent interpretations of studies include: COVID-positive    Shared decision making was done by: Patient and myself  Discussions of management was done with: Patient    Patient is well appearing, non-toxic and in no acute distress.  Vital signs are stable.   Discussed results and plan.  Discussed COVID-positive test result.  Advised on masking for 5 days from positive test, p.o. fluids, Tylenol Motrin as needed  PT asking about paxlovid. PT is well appearing with minimal symptoms. She has normal vitals and believe that this would not be appropriate criteria for this medication use. Advised on side effects etc. PT agrees.    Discussed treatment of right ear infection with Augmentin x 7 days.  All questions answered. Return and ER precautions given.    Counseled: Patient, regarding diagnosis, regarding treatment plan, regarding diagnostic results, regarding prescription, I have discussed with the patient the results of tests, differential diagnosis, and warning signs and symptoms that should prompt immediate return. The patient  understands these instructions and agrees to the follow-up plan provided. There is no barriers to learning. Appropriate f/u given. Patient agrees to return for any concerns/ problems/complications.          Medical Decision Making      Disposition and Plan     Clinical Impression:  1. COVID-19    2. Non-recurrent acute suppurative otitis media of right ear without spontaneous rupture of tympanic membrane         Disposition:  Discharge  2/5/2025  4:33 pm    Follow-up:  Leo De Los Santos MD  [address] [place] [id number]  [phone]      As needed          Medications Prescribed:  Discharge Medication List as of 2/5/2025  4:33 PM        START taking these medications    Details   amoxicillin clavulanate 875-125 MG Oral Tab Take 1 tablet by mouth 2 (two) times daily for 7 days., Normal, Disp-14 tablet, R-0                 Supplementary Documentation:

## 2025-02-05 NOTE — DISCHARGE INSTRUCTIONS
FOLLOW UP WITH YOUR PRIMARY CARE PROVIDER As needed.  -SELF QUARANTINE AND STAY AWAY FROM OTHERS WHILE YOU HAVE SYMPTOMS  CDC recommends being fever free for 24 hours without medication prior to return to work AND masking for 5 days while around others from positive test  -WASH YOUR HANDS OFTEN, DISINFECT COMMON SURFACES AROUND HOME THAT OTHERS USE  -COVER YOUR COUGH AND WEAR A MASK!  -DRINK PLENTY OF WATER, GATORADE, EAT SMALL MEALS, WELL BALANCED FOODS  -TAKE TYLENOL 650MG-1000MG TABLET BY MOUTH EVERY 6 HOURS AS NEEDED FOR BODYACHES, PAIN/FEVER > 100.4  Self-Care at Home:  Runny Nose/Congestion:  - Nasal Rinse (Ness Pot)  - Flonase nasal spray to each nostril once or twice daily  - Antihistamine (Zyrtec, Claritin) once daily  - Nasal Decongestant (Sudafed) as directed on package  Cough:  - Mucinex OR Robitussin DM  - Warm tea w/honey  - Humidifier in bedroom at night  Ear infection: Augmentin two times a day for 7 days.    GO TO ER: worsening shortness of breath, chest pain, fever > 102 despite use of tylenol or motrin, vomiting and diarrhea and unable to keep down fluids

## 2025-02-06 NOTE — TELEPHONE ENCOUNTER
Patient contacted on-call provider.   + covid   Went to the  today and was not prescribed paxlovid. She is inquiring why?  Per documentation   PT asking about paxlovid. PT is well appearing with minimal symptoms. She has normal vitals and believe that this would not be appropriate criteria for this medication use. Advised on side effects etc. PT agrees.   Discussed with patient.  Patient stated she will talk with her daughter who is a physician and have her prescribe paxlovid if she feels it is appropriate.

## 2025-03-03 RX ORDER — TIRZEPATIDE 12.5 MG/.5ML
12.5 INJECTION, SOLUTION SUBCUTANEOUS WEEKLY
Qty: 2 ML | Refills: 0 | Status: SHIPPED | OUTPATIENT
Start: 2025-03-03

## 2025-03-03 NOTE — TELEPHONE ENCOUNTER
Endocrine Refill protocol for oral and injectable diabetic medications    Protocol Criteria:  PASSED  Reason: N/A    If all below requirements are met, send a 90-day supply with 1 refill per provider protocol.    Verify appointment with Endocrinology completed in the last 6 months or scheduled in the next 3 months.  Verify A1C has been completed within the last 6 months and is below 8.5%     Last completed office visit: 11/13/2024 Imani Carmichael APRN   Next scheduled Follow up: no future appt     Last A1c result: Last A1c value was 5.7% done 11/13/2024.

## 2025-03-07 ENCOUNTER — TELEPHONE (OUTPATIENT)
Dept: ENDOCRINOLOGY CLINIC | Facility: CLINIC | Age: 65
End: 2025-03-07

## 2025-03-07 NOTE — TELEPHONE ENCOUNTER
[facility] called to inform BENTON Pickett that a diagnosis code is needed for the patient's Mounjaro. Please call .

## 2025-03-24 ENCOUNTER — NURSE TRIAGE (OUTPATIENT)
Dept: INTERNAL MEDICINE CLINIC | Facility: CLINIC | Age: 65
End: 2025-03-24

## 2025-03-24 RX ORDER — CLOTRIMAZOLE 2 G/100G
CREAM VAGINAL
Qty: 21 G | Refills: 0 | Status: SHIPPED | OUTPATIENT
Start: 2025-03-24

## 2025-03-24 NOTE — TELEPHONE ENCOUNTER
Action Requested: Summary for Provider     []  Critical Lab, Recommendations Needed  [x] Need Additional Advice  []   FYI    []   Need Orders  [x] Need Medications Sent to Pharmacy  []  Other     SUMMARY: Per protocol, patient should be seen in the office today or tomorrow. Requesting refill of clotrimazole vaginal cream.     Dr. Leo De Los Santos please advise.     Reason for call: Vaginal Problem  Onset: 2-3 Days Ago    Patient reports of vaginal symptom of severe itching that started 2-3 days ago. States she has a little bit of cream left from previous prescription and tried applying it which seems be effective in relieving the itching. Today is day #2 of applying the clotrimazole vaginal cream. Denies any other vaginal symptoms. Requesting refill of vaginal cream. Care advice given per protocol. Patient verbalized understanding and agreed with plan of care.     Reason for Disposition   MODERATE-SEVERE itching (i.e., interferes with school, work, or sleep)    Protocols used: Vaginal Symptoms-A-OH

## 2025-04-02 RX ORDER — TIRZEPATIDE 12.5 MG/.5ML
12.5 INJECTION, SOLUTION SUBCUTANEOUS WEEKLY
Qty: 2 ML | Refills: 0 | Status: SHIPPED | OUTPATIENT
Start: 2025-04-02

## 2025-04-14 RX ORDER — MELOXICAM 15 MG/1
15 TABLET ORAL DAILY
Qty: 30 TABLET | Refills: 0 | Status: SHIPPED | OUTPATIENT
Start: 2025-04-14

## 2025-04-14 RX ORDER — ALBUTEROL SULFATE 90 UG/1
INHALANT RESPIRATORY (INHALATION)
Qty: 18 G | Refills: 2 | Status: SHIPPED | OUTPATIENT
Start: 2025-04-14

## 2025-04-14 NOTE — TELEPHONE ENCOUNTER
Please review. Refill failed protocol.   High  Allergy/Contraindication: MeloxicamReactions: WHEEZING. No reaction type specified. User documented allergy severity: None specified.  Level 2 with IBUPROFEN (Class: NSAIDS).  \"If received in large doses starts wheezing\"

## 2025-04-25 ENCOUNTER — TELEPHONE (OUTPATIENT)
Dept: ENDOCRINOLOGY CLINIC | Facility: CLINIC | Age: 65
End: 2025-04-25

## 2025-04-25 NOTE — TELEPHONE ENCOUNTER
Patient calling states needs refill request on Mounjaro. States prior authorization is needed. States as well needs to notate that patient uses to being diabetic. Please call.

## 2025-05-01 RX ORDER — TIRZEPATIDE 12.5 MG/.5ML
12.5 INJECTION, SOLUTION SUBCUTANEOUS WEEKLY
Qty: 2 ML | Refills: 0 | Status: SHIPPED | OUTPATIENT
Start: 2025-05-01

## 2025-05-12 ENCOUNTER — LAB ENCOUNTER (OUTPATIENT)
Dept: LAB | Age: 65
End: 2025-05-12
Attending: INTERNAL MEDICINE
Payer: MEDICARE

## 2025-05-12 ENCOUNTER — OFFICE VISIT (OUTPATIENT)
Dept: INTERNAL MEDICINE CLINIC | Facility: CLINIC | Age: 65
End: 2025-05-12

## 2025-05-12 ENCOUNTER — HOSPITAL ENCOUNTER (OUTPATIENT)
Dept: MAMMOGRAPHY | Facility: HOSPITAL | Age: 65
Discharge: HOME | End: 2025-05-12
Attending: INTERNAL MEDICINE
Payer: MEDICARE

## 2025-05-12 VITALS
DIASTOLIC BLOOD PRESSURE: 76 MMHG | HEIGHT: 62 IN | OXYGEN SATURATION: 95 % | SYSTOLIC BLOOD PRESSURE: 126 MMHG | BODY MASS INDEX: 28.52 KG/M2 | WEIGHT: 155 LBS | HEART RATE: 74 BPM | TEMPERATURE: 98 F

## 2025-05-12 DIAGNOSIS — Z12.31 BREAST CANCER SCREENING BY MAMMOGRAM: ICD-10-CM

## 2025-05-12 DIAGNOSIS — E78.2 MIXED HYPERLIPIDEMIA: ICD-10-CM

## 2025-05-12 DIAGNOSIS — E11.9 DIABETES MELLITUS TYPE 2 WITHOUT RETINOPATHY (HCC): ICD-10-CM

## 2025-05-12 DIAGNOSIS — J45.909 MILD ASTHMA WITHOUT COMPLICATION, UNSPECIFIED WHETHER PERSISTENT (HCC): Primary | ICD-10-CM

## 2025-05-12 DIAGNOSIS — M15.9 OSTEOARTHRITIS OF MULTIPLE JOINTS, UNSPECIFIED OSTEOARTHRITIS TYPE: ICD-10-CM

## 2025-05-12 LAB
ALBUMIN SERPL-MCNC: 4.3 G/DL (ref 3.2–4.8)
ALBUMIN/GLOB SERPL: 1.7 {RATIO} (ref 1–2)
ALP LIVER SERPL-CCNC: 123 U/L (ref 50–130)
ALT SERPL-CCNC: 18 U/L (ref 10–49)
ANION GAP SERPL CALC-SCNC: 3 MMOL/L (ref 0–18)
AST SERPL-CCNC: 20 U/L (ref ?–34)
BILIRUB SERPL-MCNC: 1 MG/DL (ref 0.2–1.1)
BUN BLD-MCNC: 13 MG/DL (ref 9–23)
BUN/CREAT SERPL: 17.1 (ref 10–20)
CALCIUM BLD-MCNC: 9.4 MG/DL (ref 8.7–10.4)
CHLORIDE SERPL-SCNC: 105 MMOL/L (ref 98–112)
CHOLEST SERPL-MCNC: 185 MG/DL (ref ?–200)
CO2 SERPL-SCNC: 32 MMOL/L (ref 21–32)
CREAT BLD-MCNC: 0.76 MG/DL (ref 0.55–1.02)
CREAT UR-SCNC: 108.3 MG/DL
DEPRECATED RDW RBC AUTO: 48.7 FL (ref 35.1–46.3)
EGFRCR SERPLBLD CKD-EPI 2021: 87 ML/MIN/1.73M2 (ref 60–?)
ERYTHROCYTE [DISTWIDTH] IN BLOOD BY AUTOMATED COUNT: 13.5 % (ref 11–15)
EST. AVERAGE GLUCOSE BLD GHB EST-MCNC: 128 MG/DL (ref 68–126)
FASTING PATIENT LIPID ANSWER: YES
FASTING STATUS PATIENT QL REPORTED: YES
GLOBULIN PLAS-MCNC: 2.6 G/DL (ref 2–3.5)
GLUCOSE BLD-MCNC: 88 MG/DL (ref 70–99)
HBA1C MFR BLD: 6.1 % (ref ?–5.7)
HCT VFR BLD AUTO: 44.7 % (ref 35–48)
HDLC SERPL-MCNC: 70 MG/DL (ref 40–59)
HGB BLD-MCNC: 13.9 G/DL (ref 12–16)
LDLC SERPL CALC-MCNC: 102 MG/DL (ref ?–100)
MCH RBC QN AUTO: 30.2 PG (ref 26–34)
MCHC RBC AUTO-ENTMCNC: 31.1 G/DL (ref 31–37)
MCV RBC AUTO: 97 FL (ref 80–100)
MICROALBUMIN UR-MCNC: <0.3 MG/DL
NONHDLC SERPL-MCNC: 115 MG/DL (ref ?–130)
OSMOLALITY SERPL CALC.SUM OF ELEC: 290 MOSM/KG (ref 275–295)
PLATELET # BLD AUTO: 245 10(3)UL (ref 150–450)
POTASSIUM SERPL-SCNC: 4.3 MMOL/L (ref 3.5–5.1)
PROT SERPL-MCNC: 6.9 G/DL (ref 5.7–8.2)
RBC # BLD AUTO: 4.61 X10(6)UL (ref 3.8–5.3)
SODIUM SERPL-SCNC: 140 MMOL/L (ref 136–145)
TRIGL SERPL-MCNC: 71 MG/DL (ref 30–149)
TSI SER-ACNC: 0.73 UIU/ML (ref 0.55–4.78)
VLDLC SERPL CALC-MCNC: 12 MG/DL (ref 0–30)
WBC # BLD AUTO: 5 X10(3) UL (ref 4–11)

## 2025-05-12 PROCEDURE — 83036 HEMOGLOBIN GLYCOSYLATED A1C: CPT

## 2025-05-12 PROCEDURE — 36415 COLL VENOUS BLD VENIPUNCTURE: CPT

## 2025-05-12 PROCEDURE — 3074F SYST BP LT 130 MM HG: CPT | Performed by: INTERNAL MEDICINE

## 2025-05-12 PROCEDURE — 85027 COMPLETE CBC AUTOMATED: CPT

## 2025-05-12 PROCEDURE — 80061 LIPID PANEL: CPT

## 2025-05-12 PROCEDURE — 77063 BREAST TOMOSYNTHESIS BI: CPT | Performed by: INTERNAL MEDICINE

## 2025-05-12 PROCEDURE — 80053 COMPREHEN METABOLIC PANEL: CPT

## 2025-05-12 PROCEDURE — G2211 COMPLEX E/M VISIT ADD ON: HCPCS | Performed by: INTERNAL MEDICINE

## 2025-05-12 PROCEDURE — 82570 ASSAY OF URINE CREATININE: CPT

## 2025-05-12 PROCEDURE — 3008F BODY MASS INDEX DOCD: CPT | Performed by: INTERNAL MEDICINE

## 2025-05-12 PROCEDURE — 99214 OFFICE O/P EST MOD 30 MIN: CPT | Performed by: INTERNAL MEDICINE

## 2025-05-12 PROCEDURE — 77067 SCR MAMMO BI INCL CAD: CPT | Performed by: INTERNAL MEDICINE

## 2025-05-12 PROCEDURE — 82043 UR ALBUMIN QUANTITATIVE: CPT

## 2025-05-12 PROCEDURE — 84443 ASSAY THYROID STIM HORMONE: CPT

## 2025-05-12 PROCEDURE — 3078F DIAST BP <80 MM HG: CPT | Performed by: INTERNAL MEDICINE

## 2025-05-12 RX ORDER — POTASSIUM CHLORIDE 1.5 G/1.58G
20 POWDER, FOR SOLUTION ORAL 2 TIMES DAILY
COMMUNITY

## 2025-05-12 NOTE — PROGRESS NOTES
opCdhruv Connell is a 65 year old female.  Chief Complaint   Patient presents with    Follow - Up     Would like to discuss advair inhaler issues with insurance, and would like to discuss if she should be taking meloxicam long term     HPI:        Morenita Connell is a 65 year old female with asthma who presents for a follow-up regarding her asthma medication and insurance coverage.    Morenita is concerned about her asthma medication, Advair, which is not covered under her new Medicare Advantage plan with [employer]. She seeks assistance in finding an alternative medication that is covered. She has not reviewed the letter from her insurance company that may list alternative medications.    She is currently taking Mounjaro 12.5 mg and tolerates it well without major side effects such as difficulty swallowing, stomach pain, nausea, or vomiting.    She experiences significant pain from arthritis, particularly in her right shoulder. She was previously on naproxen 500 mg twice daily for back pain and hip replacement issues but now uses meloxicam as needed for arthritis pain, especially after a recent fall causing soreness in her arm and shoulder.    She needs a mammogram and has difficulty scheduling it herself, requesting assistance to set up an appointment. She is due for blood testing and is considering completing it today. She does not monitor her blood pressure or blood sugars at home, noting that her A1c has improved significantly.       Current Medications[1]   Past Medical History[2]   Past Surgical History[3]   Social History:  Short Social Hx on File[4]   Family History[5]   Allergies[6]     REVIEW OF SYSTEMS:   Review of Systems   Review of Systems   Constitutional: Negative for activity change, appetite change and fever.   HENT: Negative for congestion and voice change.    Respiratory: Negative for cough and shortness of breath.    Cardiovascular: Negative for chest pain.   Gastrointestinal: Negative for abdominal  distention, abdominal pain and vomiting.   Genitourinary: Negative for hematuria.   Skin: Negative for wound.   Psychiatric/Behavioral: Negative for behavioral problems.   Arthritis present  Wt Readings from Last 5 Encounters:   05/12/25 155 lb (70.3 kg)   11/13/24 148 lb 3.2 oz (67.2 kg)   11/12/24 147 lb (66.7 kg)   05/20/24 159 lb 8 oz (72.3 kg)   02/07/24 157 lb (71.2 kg)     Body mass index is 28.35 kg/m².      EXAM:   /76   Pulse 74   Temp 97.5 °F (36.4 °C) (Temporal)   Ht 5' 2\" (1.575 m)   Wt 155 lb (70.3 kg)   SpO2 95%   BMI 28.35 kg/m²   Physical Exam   Constitutional:       Appearance: Normal appearance.   HENT:      Head: Normocephalic.   Eyes:      Conjunctiva/sclera: Conjunctivae normal.   Cardiovascular:      Rate and Rhythm: Normal rate and regular rhythm.      Heart sounds: Normal heart sounds. No murmur heard.  Pulmonary:      Effort: Pulmonary effort is normal.      Breath sounds: Normal breath sounds. No rhonchi or rales.   Abdominal:      General: Bowel sounds are normal.      Palpations: Abdomen is soft.      Tenderness: There is no abdominal tenderness.   Musculoskeletal:      Cervical back: Neck supple.      Right lower leg: No edema.      Left lower leg: No edema.   Skin:     General: Skin is warm and dry.   Neurological:      General: No focal deficit present.      Mental Status: He is alert and oriented to person, place, and time. Mental status is at baseline.   Psychiatric:         Mood and Affect: Mood normal.         Behavior: Behavior normal.       ASSESSMENT AND PLAN:   1. Mild asthma without complication, unspecified whether persistent (HCC)      2. Diabetes mellitus type 2 without retinopathy (HCC)  Check hemoglobin A1c.  I will adjust medicine accordingly.  Continue statins.  Check urine for microalbumin.  Ophthalmology referral given  - CBC, Platelet; No Differential; Future  - Comp Metabolic Panel (14); Future  - Hemoglobin A1C; Future  - Lipid Panel; Future  - TSH W  Reflex To Free T4; Future  - Microalb/Creat Ratio, Random Urine; Future    3. Mixed hyperlipidemia  Lab Results   Component Value Date    LDL 98 11/12/2024    AST 21 11/12/2024    ALT 14 11/12/2024      Encouraged patient to eat healthy.  Avoid fat fried foods and increase activity as tolerated.  We will monitor lipid profile and liver function test.    - CBC, Platelet; No Differential; Future  - Comp Metabolic Panel (14); Future  - Hemoglobin A1C; Future  - Lipid Panel; Future  - TSH W Reflex To Free T4; Future  - Microalb/Creat Ratio, Random Urine; Future    4. Breast cancer screening by mammogram  Mammogram appointment scheduled for today.  - [facility] CORI 2D+3D SCREENING BILAT (CPT=77067/87638); Future    5. Osteoarthritis of multiple joints, unspecified osteoarthritis type  With on and off flareups.  She takes meloxicam with meals.       Severe arthritis of the right shoulder  Severe arthritis causing significant pain, managed with meloxicam without major side effects.  - Prescribe 90-day supply of meloxicam for as-needed use.  - Coordinate with pharmacy for prescription fulfillment based on insurance requirements.    Asthma  Asthma management complicated by insurance changes affecting Advair coverage. Alternatives needed based on [employer] formulary.  - Instruct her to provide a list of covered alternatives for asthma management.  - Coordinate with pharmacy to determine coverage for alternative asthma medications.     Plan: Labs ordered, mammogram scheduled.  Ophthalmology referral given.  I will see her back in 4 months for her Medicare physical.      The patient indicates understanding of these issues and agrees to the plan.  No follow-ups on file.    This note was prepared using Dragon Medical voice recognition dictation software. As a result errors may occur. When identified these errors have been corrected. While every attempt is made to correct errors during dictation discrepancies may still exist.         [1]    Current Outpatient Medications   Medication Sig Dispense Refill    potassium chloride 20 MEQ Oral Powd Pack Take 20 mEq by mouth 2 (two) times daily.      Magnesium 100 MG Oral Tab Take by mouth.      MOUNJARO 12.5 MG/0.5ML Subcutaneous Solution Auto-injector Inject 12.5 mg into the skin once a week. 2 mL 0    albuterol 108 (90 Base) MCG/ACT Inhalation Aero Soln Inhale 1 puff into the lungs every six hours as needed 18 g 2    Meloxicam 15 MG Oral Tab Take 1 tablet (15 mg total) by mouth daily. With food 30 tablet 0    clotrimazole (3 DAY VAGINAL) 2 % Vaginal Cream Apply once when needed 21 g 0    fluticasone-salmeterol (ADVAIR DISKUS) 250-50 MCG/ACT Inhalation Aerosol Powder, Breath Activated Inhale 1 puff into the lungs Q12H. 180 each 3    montelukast 10 MG Oral Tab Take 1 tablet (10 mg total) by mouth daily. 90 tablet 3    atorvastatin 40 MG Oral Tab Take 1 tablet (40 mg total) by mouth daily. 90 tablet 3    Blood Glucose Monitoring Suppl (TRUE METRIX METER) Does not apply Device 1 Device daily. 1 each 0    IRON 65 mg by Does not apply route daily.        vitamin E 400 UNITS Oral Cap Take 1 capsule (400 Units total) by mouth daily. (Patient not taking: Reported on 2025)     [2]   Past Medical History:   Arthritis    legs    Asthma (HCC)    Stress induced    Diabetes (HCC)    High blood cholesterol    Need for vaccination    Osteoarthritis    Vaginal yeast infection   [3]   Past Surgical History:  Procedure Laterality Date           delivery only      x3    Gastric bypass,obesity,sb reconstruc      Hernia surgery      Other      gastric bypass    Other surgical history      hernia repair/tummy tuck    Other surgical history      hernia repair    Total hip replacement Right 2016    Total hip replacement Left 10/2016   [4]   Social History  Socioeconomic History    Marital status:    Tobacco Use    Smoking status: Former     Current packs/day: 0.00     Average packs/day: 1 pack/day  for 12.0 years (12.0 ttl pk-yrs)     Types: Cigarettes     Start date: 1992     Quit date: 2004     Years since quittin.6    Smokeless tobacco: Never   Vaping Use    Vaping status: Never Used   Substance and Sexual Activity    Alcohol use: Yes     Comment: socially    Drug use: No     Social Drivers of Health     Food Insecurity: No Food Insecurity (2025)    NCSS - Food Insecurity     Worried About Running Out of Food in the Last Year: No     Ran Out of Food in the Last Year: No   Transportation Needs: No Transportation Needs (2025)    NCSS - Transportation     Lack of Transportation: No   Housing Stability: Not At Risk (2025)    NCSS - Housing/Utilities     Has Housing: Yes     Worried About Losing Housing: No     Unable to Get Utilities: No   [5]   Family History  Problem Relation Age of Onset    Diabetes Father     Hypertension Father     Obesity Father     Obesity Mother     Obesity Sister     Diabetes Brother     Heart Disorder Brother         CABG x 3    Other (copd) Brother     Obesity Sister     Breast Cancer Paternal Cousin Female     Macular degeneration Neg     Glaucoma Neg    [6]   Allergies  Allergen Reactions    Aspirin     Ibuprofen WHEEZING     If received in large doses starts wheezing

## 2025-05-12 NOTE — PROGRESS NOTES
The following individual(s) verbally consented to be recorded using ambient AI listening technology and understand that they can each withdraw their consent to this listening technology at any point by asking the clinician to turn off or pause the recording: Yes    Patient name: Morenita Connell  Additional names:

## 2025-05-13 ENCOUNTER — TELEPHONE (OUTPATIENT)
Dept: INTERNAL MEDICINE CLINIC | Facility: CLINIC | Age: 65
End: 2025-05-13

## 2025-05-15 ENCOUNTER — TELEPHONE (OUTPATIENT)
Dept: INTERNAL MEDICINE CLINIC | Facility: CLINIC | Age: 65
End: 2025-05-15

## 2025-05-15 DIAGNOSIS — J45.909 MILD ASTHMA WITHOUT COMPLICATION, UNSPECIFIED WHETHER PERSISTENT (HCC): ICD-10-CM

## 2025-05-15 RX ORDER — FLUTICASONE PROPIONATE AND SALMETEROL 250; 50 UG/1; UG/1
1 POWDER RESPIRATORY (INHALATION) EVERY 12 HOURS
Qty: 180 EACH | Refills: 3 | Status: SHIPPED | OUTPATIENT
Start: 2025-05-15

## 2025-05-15 RX ORDER — MELOXICAM 15 MG/1
15 TABLET ORAL DAILY
Qty: 90 TABLET | Refills: 1 | Status: SHIPPED | OUTPATIENT
Start: 2025-05-15

## 2025-05-15 NOTE — TELEPHONE ENCOUNTER
Pt stated she was seen Monday , was advised to call with Mail order pharmacy information   90 day supply     1- Meloxicam  2-Advair

## 2025-05-22 RX ORDER — TIRZEPATIDE 12.5 MG/.5ML
12.5 INJECTION, SOLUTION SUBCUTANEOUS WEEKLY
Qty: 2 ML | Refills: 0 | Status: SHIPPED | OUTPATIENT
Start: 2025-05-22

## 2025-05-22 NOTE — TELEPHONE ENCOUNTER
Endocrine Refill protocol for oral and injectable diabetic medications    Protocol Criteria:  FAILED  Reason: No Visit in required time frame mcm sent    If all below requirements are met, send a 90-day supply with 1 refill per provider protocol.    Verify appointment with Endocrinology completed in the last 6 months or scheduled in the next 3 months.  Verify A1C has been completed within the last 6 months and is below 8.5%     Last completed office visit: 11/13/2024 Imani Carmichael APRN   Next scheduled Follow up: no future appt mcm sent      Last A1c result: Last A1c value was 6.1% done 5/12/2025.

## 2025-06-19 RX ORDER — TIRZEPATIDE 12.5 MG/.5ML
12.5 INJECTION, SOLUTION SUBCUTANEOUS WEEKLY
Qty: 2 ML | Refills: 0 | Status: SHIPPED | OUTPATIENT
Start: 2025-06-19

## 2025-06-19 NOTE — TELEPHONE ENCOUNTER
Endocrine Refill protocol for oral and injectable diabetic medications    Protocol Criteria:  PASSED  Reason: N/A    If all below requirements are met, send a 90-day supply with 1 refill per provider protocol.    Verify appointment with Endocrinology completed in the last 6 months or scheduled in the next 3 months.  Verify A1C has been completed within the last 6 months and is below 8.5%     Last completed office visit: 11/13/2024 Imani Carmichael APRN   Next scheduled Follow up:   Future Appointments   Date Time Provider Department Center   9/17/2025  8:45 AM Imani Carmichael APRN [facility]   9/22/2025  3:00 PM [facility]      Last A1c result: Last A1c value was 6.1% done 5/12/2025.

## 2025-06-23 ENCOUNTER — MED REC SCAN ONLY (OUTPATIENT)
Dept: INTERNAL MEDICINE CLINIC | Facility: CLINIC | Age: 65
End: 2025-06-23

## 2025-07-07 ENCOUNTER — HOSPITAL ENCOUNTER (OUTPATIENT)
Age: 65
Discharge: HOME | End: 2025-07-07
Attending: STUDENT IN AN ORGANIZED HEALTH CARE EDUCATION/TRAINING PROGRAM
Payer: MEDICARE

## 2025-07-07 VITALS
HEART RATE: 78 BPM | SYSTOLIC BLOOD PRESSURE: 130 MMHG | RESPIRATION RATE: 16 BRPM | TEMPERATURE: 97 F | DIASTOLIC BLOOD PRESSURE: 72 MMHG | OXYGEN SATURATION: 97 %

## 2025-07-07 DIAGNOSIS — R39.9 URINARY TRACT INFECTION SYMPTOMS: Primary | ICD-10-CM

## 2025-07-07 DIAGNOSIS — N89.8 VAGINAL PRURITUS: ICD-10-CM

## 2025-07-07 LAB
BILIRUB UR QL STRIP: NEGATIVE
GLUCOSE UR STRIP-MCNC: NEGATIVE MG/DL
HGB UR QL STRIP: NEGATIVE
KETONES UR STRIP-MCNC: NEGATIVE MG/DL
LEUKOCYTE ESTERASE UR QL STRIP: NEGATIVE
NITRITE UR QL STRIP: NEGATIVE
PH UR STRIP: 6 [PH]
PROT UR STRIP-MCNC: NEGATIVE MG/DL
SP GR UR STRIP: 1.02
UROBILINOGEN UR STRIP-ACNC: <2 MG/DL

## 2025-07-07 PROCEDURE — 81002 URINALYSIS NONAUTO W/O SCOPE: CPT

## 2025-07-07 PROCEDURE — 99214 OFFICE O/P EST MOD 30 MIN: CPT

## 2025-07-07 PROCEDURE — 87086 URINE CULTURE/COLONY COUNT: CPT | Performed by: STUDENT IN AN ORGANIZED HEALTH CARE EDUCATION/TRAINING PROGRAM

## 2025-07-07 PROCEDURE — 81514 NFCT DS BV&VAGINITIS DNA ALG: CPT | Performed by: STUDENT IN AN ORGANIZED HEALTH CARE EDUCATION/TRAINING PROGRAM

## 2025-07-07 RX ORDER — NITROFURANTOIN 25; 75 MG/1; MG/1
100 CAPSULE ORAL 2 TIMES DAILY
Qty: 10 CAPSULE | Refills: 0 | Status: SHIPPED | OUTPATIENT
Start: 2025-07-07 | End: 2025-07-12

## 2025-07-07 NOTE — ED PROVIDER NOTES
Patient Seen in: [facility]        History  Chief Complaint   Patient presents with    Urinary Symptoms     Stated Complaint: Urinary Symptoms    Subjective:   HPI      65-year-old female with past medical history of diabetes and previous gastric bypass, who presents with concern for foul-smelling urine for the last couple of days and generalized malaise, but no other infectious sources.  She denies any vomiting, fevers, abdominal pain, diarrhea, or upper back pain.  She denies any sore throat or cough or nasal congestion.  She notes that she has had pruritus of the vaginal region but no abnormal vaginal discharge.  She states symptoms today are consistent with previous UTIs.  Per chart review, I assessed the patient on 2024 for symptoms that had started with foul-smelling urine but then had progressed over 6 days to burning with urination and suprapubic pressure, her urine dip showed nitrites and leukocyte esterases, previous urine cultures from 2023 showed E. coli which was only resistant to ampicillin, and urine culture from 2024 showed no growth.  From my assessment on 2024 her urine culture did grow E. coli which was only resistant to ampicillin.  Patient does state that yesterday and the night before she took leftover Keflex from my prescription a little over a year ago on 2024 to no relief of her symptoms.  She denies any antibiotic allergies.  Per chart review, she has previously tolerated Macrobid.        Objective:     Past Medical History:    Allergic rhinitis    Not sure when they started    Arthritis    legs    Asthma (HCC)    Stress induced    Diabetes (HCC)    High blood cholesterol    Hyperlipidemia    Need for vaccination    Obesity    Osteoarthritis    Vaginal yeast infection              Past Surgical History:   Procedure Laterality Date           delivery only      x3    Colonoscopy      D & c      Gastric bypass,obesity,sb reconstruc       Hernia surgery      Hip replacement surgery  Aug 2016 and Oct 2016    Other      gastric bypass    Other surgical history      hernia repair/tummy tuck    Other surgical history      hernia repair    Total hip replacement Right 2016    Total hip replacement Left 10/2016                Social History     Socioeconomic History    Marital status:    Tobacco Use    Smoking status: Former     Current packs/day: 0.00     Average packs/day: 1 pack/day for 12.0 years (12.0 ttl pk-yrs)     Types: Cigarettes     Start date: 1992     Quit date: 2004     Years since quittin.8    Smokeless tobacco: Never   Vaping Use    Vaping status: Never Used   Substance and Sexual Activity    Alcohol use: Yes     Comment: socially    Drug use: No     Social Drivers of Health     Food Insecurity: No Food Insecurity (2025)    NCSS - Food Insecurity     Worried About Running Out of Food in the Last Year: No     Ran Out of Food in the Last Year: No   Transportation Needs: No Transportation Needs (2025)    NCSS - Transportation     Lack of Transportation: No   Housing Stability: Not At Risk (2025)    NCSS - Housing/Utilities     Has Housing: Yes     Worried About Losing Housing: No     Unable to Get Utilities: No              Review of Systems    Positive for stated complaint: Urinary Symptoms  Other systems are as noted in HPI.  Constitutional and vital signs reviewed.      All other systems reviewed and negative except as noted above.                  Physical Exam    ED Triage Vitals [25 1015]   /72   Pulse 78   Resp 16   Temp 97.3 °F (36.3 °C)   Temp src Oral   SpO2 97 %   O2 Device None (Room air)       Current Vitals:   Vital Signs  BP: 130/72  Pulse: 78  Resp: 16  Temp: 97.3 °F (36.3 °C)  Temp src: Oral    Oxygen Therapy  SpO2: 97 %  O2 Device: None (Room air)            Physical Exam    General: Awake, alert, comfortable on room air, in no distress, tolerating oral secretions,  interactive  Pulmonary: Lungs CTA B, no wheezing, no conversational dyspnea  GI: Abdomen soft, nontender, nondistended, no rebound, no guarding  Neuro: Symmetrical facial expressions on gross observation  Musculoskeletal: No B/L CVA tenderness  HEENT: No periorbital edema or erythema, nonerythematous and nonedematous intact B/L TMs, nonerythematous and nonedematous B/L tonsils, no tonsillar exudates, no peritonsillar edema, uvula midline, tolerating oral secretions, normal speech, no submandibular edema  Psych: Normal mood, normal affect        ED Course  Labs Reviewed   [facility] POCT URINALYSIS DIPSTICK - Abnormal; Notable for the following components:       Result Value    Urine Clarity Cloudy (*)     All other components within normal limits   URINE CULTURE, ROUTINE   VAGINITIS VAGINOSIS PCR PANEL       MDM  Patient is awake, alert, comfortable on room air, in no distress, afebrile, lungs CTAB with no wheezing with no sign of acute bronchospasm, reports urinary symptoms for which she has been using leftover Keflex to no relief of symptoms, no signs or symptoms of pyelonephritis at this time, no signs of otitis media or otitis externa, no sign of tonsillitis or PTA or deep space infection, abdomen is soft and she reports nontender, we did discuss that her urine only shows cloudy urine with no signs otherwise of infection, but she has been taking leftover Keflex which could be affecting these results and therefore we will send urine culture as previously her symptoms started with foul-smelling urine and did progress to a UTI, but we also discussed that inflammation within the abdomen can cause similar symptoms to a UTI, patient is adamant that she does not have any abdominal pain and therefore does not feel she requires any advanced imaging of the abdomen at this time, therefore no lab work or advanced imaging performed today, she also describes vaginal pruritus for which we discussed that she can self swab for  potential vaginitis as she has no signs or symptoms of TOA or PID or torsion at this time to indicate speculum or manual exam, she is agreeable to this plan  -Given reported urinary symptoms, as above, urine culture has been sent, will initiate Macrobid as she has been taking leftover Keflex to no improvement of symptoms. Per assessment of most recent CMP from 11/12/2024, patient had normal kidney function with creatinine of 0.71 and normal GFR of 95.  No indication for renal dosing.  -We did discuss that even in the setting of antibiotics, infections can still spread, progress, and develop complications, as well as there could be other sources for her symptoms given unremarkable urine dip today, and therefore with any new, changing, or progressing signs or symptoms, she should be immediately reassessed.  -Currently, no signs of pyelonephritis or septic nephrolithiasis or an acute abdomen, but strict ED precautions were discussed      Medical Decision Making  Amount and/or Complexity of Data Reviewed  External Data Reviewed: labs and notes.     Details: Immediate care note from 6/30/2024, urine cultures from 8/24/2023, 5/2/2024, and 6/30/2024, and CMP from 11/12/2024 reviewed  Labs: ordered.    Risk  Prescription drug management.        Disposition and Plan     Clinical Impression:  1. Urinary tract infection symptoms    2. Vaginal pruritus         Disposition:  Discharge  7/7/2025 11:03 am    Follow-up:  Leo De Los Santos MD  [address] [place] [id number] [phone]    In 3 days  As needed, If symptoms worsen          Medications Prescribed:  Discharge Medication List as of 7/7/2025 11:32 AM        START taking these medications    Details   nitrofurantoin monohydrate macro 100 MG Oral Cap Take 1 capsule (100 mg total) by mouth 2 (two) times daily for 5 days. STOP taking Keflex and START taking Macrobid., Normal, Disp-10 capsule, R-0

## 2025-07-07 NOTE — ED INITIAL ASSESSMENT (HPI)
Patient arrives ambulatory with c/o foul smelling urine and feeling itchy since Friday night. Also reports back pain and achyness. Reports taking Keflex since Saturday that she had leftovers from another infection.

## 2025-07-07 NOTE — DISCHARGE INSTRUCTIONS
Your story is concerning for urinary tract infection. STOP taking the Keflex and start the Macrobid.  This is an antibiotic to help treat for bladder infection.  However, even in the setting of antibiotics, infections can still spread, progress, and develop complications, and your symptoms are vague as well as early in your clinical course, and therefore with any new, changing, or progressing signs or symptoms, you should be immediately reassessed.    With any abdominal pain, vomiting, fevers, upper back pain, I recommend immediate assessment through the emergency department.    Given vaginal itching we have also sent a vaginitis sample to assess for potentially vaginal candidiasis/yeast infection versus bacterial vaginosis which can cause vaginal itching.    Urine culture was also sent to assess for bacterial growth.      Your story is concerning for a urinary tract infection for which I have prescribed antibiotics.  Symptoms should begin to improve within 72 hours on antibiotics, if there is no improvement, or if you develop any new, changing, or progressing signs or symptoms, please follow-up immediately with your primary care physician.    Even in the setting of antibiotics, infections can continue to progress.  If you notice any back pain, abdominal pain, fevers, vomiting, lethargy, dehydration, or any other concerns, please present immediately to the emergency department for assessment.

## 2025-07-08 LAB
BV BACTERIA DNA VAG QL NAA+PROBE: NEGATIVE
C GLABRATA DNA VAG QL NAA+PROBE: NEGATIVE
C KRUSEI DNA VAG QL NAA+PROBE: NEGATIVE
CANDIDA DNA VAG QL NAA+PROBE: NEGATIVE
T VAGINALIS DNA VAG QL NAA+PROBE: NEGATIVE

## 2025-07-11 RX ORDER — TIRZEPATIDE 12.5 MG/.5ML
12.5 INJECTION, SOLUTION SUBCUTANEOUS WEEKLY
Qty: 6 ML | Refills: 1 | Status: SHIPPED | OUTPATIENT
Start: 2025-07-11

## 2025-07-11 NOTE — TELEPHONE ENCOUNTER
Endocrine Refill protocol for oral and injectable diabetic medications    Protocol Criteria:  PASSED  Reason: N/A    If all below requirements are met, send a 90-day supply with 1 refill per provider protocol.    Verify appointment with Endocrinology completed in the last 6 months or scheduled in the next 3 months.  Verify A1C has been completed within the last 6 months and is below 8.5%     Last completed office visit: 11/13/2024 Imani Carmichael APRN   Next scheduled Follow up:   Future Appointments   Date Time Provider Department Center   9/17/2025  8:45 AM Imani Carmichael APRN [facility]   9/22/2025  3:00 PM [facility]      Last A1c result: Last A1c value was 6.1% done 5/12/2025.

## 2025-08-14 RX ORDER — ATORVASTATIN CALCIUM 40 MG/1
40 TABLET, FILM COATED ORAL DAILY
Qty: 90 TABLET | Refills: 3 | Status: SHIPPED | OUTPATIENT
Start: 2025-08-14

## 2025-08-16 RX ORDER — MONTELUKAST SODIUM 10 MG/1
10 TABLET ORAL DAILY
Qty: 90 TABLET | Refills: 3 | Status: SHIPPED | OUTPATIENT
Start: 2025-08-16

## 2025-08-16 RX ORDER — ALBUTEROL SULFATE 90 UG/1
INHALANT RESPIRATORY (INHALATION)
Qty: 18 G | Refills: 2 | Status: SHIPPED | OUTPATIENT
Start: 2025-08-16

## (undated) DIAGNOSIS — E11.9 TYPE 2 DIABETES MELLITUS WITHOUT COMPLICATION, WITHOUT LONG-TERM CURRENT USE OF INSULIN (HCC): ICD-10-CM

## (undated) DIAGNOSIS — E11.9 TYPE 2 DIABETES MELLITUS WITHOUT COMPLICATION, WITHOUT LONG-TERM CURRENT USE OF INSULIN (HCC): Primary | ICD-10-CM

## (undated) DEVICE — SUTURE VICRYL 1 CT-1

## (undated) DEVICE — BIPOLAR FORCEPS CORD,BANANA LEADS: Brand: VALLEYLAB

## (undated) DEVICE — SUTURE VICRYL 0 J906G

## (undated) DEVICE — SOL  .9 1000ML BTL

## (undated) DEVICE — 3M™ STERI-STRIP™ REINFORCED ADHESIVE SKIN CLOSURES, R1547, 1/2 IN X 4 IN (12 MM X 100 MM), 6 STRIPS/ENVELOPE: Brand: 3M™ STERI-STRIP™

## (undated) DEVICE — KIT DRN 3/16IN PVC DRN 3 SPRG

## (undated) DEVICE — BONE WAX W31G

## (undated) DEVICE — TOWEL OR BLU 16X26 STRL

## (undated) DEVICE — GOWN SURG AERO BLUE PERF LG

## (undated) DEVICE — GLOVE SRG BIOGEL 8.0

## (undated) DEVICE — CHLORAPREP 26ML APPLICATOR

## (undated) DEVICE — 3M™ STERI-DRAPE™ U-DRAPE 1015: Brand: STERI-DRAPE™

## (undated) DEVICE — SUCTION CANISTER, 3000CC,SAFELINER: Brand: DEROYAL

## (undated) DEVICE — SUTURE VICRYL 2-0 CT-1

## (undated) DEVICE — ZIMMER® STERILE DISPOSABLE TOURNIQUET CUFF WITH PLC, DUAL PORT, SINGLE BLADDER, 24 IN. (61 CM)

## (undated) DEVICE — SUTURE ETHILON 4-0 699G

## (undated) DEVICE — SOL H2O 1000ML BTL

## (undated) DEVICE — SUTURE ETHILON 3-0 PS-2

## (undated) DEVICE — DRAPE SRG 70X60IN SPLT U IMPRV

## (undated) DEVICE — SUTURE ETHILON 3-0 669H

## (undated) DEVICE — 1010 S-DRAPE TOWEL DRAPE 10/BX: Brand: STERI-DRAPE™

## (undated) DEVICE — SPECIALTY ARM SLING: Brand: DEROYAL

## (undated) DEVICE — STERILE LATEX POWDER-FREE SURGICAL GLOVES WITH HYDROGEL COATING, SMOOTH FINISH, STRAIGHT FINGER: Brand: PROTEXIS

## (undated) DEVICE — UPPER EXTREMITY: Brand: MEDLINE INDUSTRIES, INC.

## (undated) DEVICE — 4.0MM DIAMOND ROUND BUR

## (undated) NOTE — ED AVS SNAPSHOT
Lucien Membrenoeliu   MRN: [id number]    Department:  [facility] Emergency Department   Date of Visit:  2/4/2020           Disclosure     Insurance plans vary and the physician(s) referred by the ER may not be covered by your plan.  Please contact y CARE PHYSICIAN AT ONCE OR RETURN IMMEDIATELY TO THE EMERGENCY DEPARTMENT. If you have been prescribed any medication(s), please fill your prescription right away and begin taking the medication(s) as directed.   If you believe that any of the medications